# Patient Record
Sex: MALE | Race: WHITE | Employment: OTHER | URBAN - NONMETROPOLITAN AREA
[De-identification: names, ages, dates, MRNs, and addresses within clinical notes are randomized per-mention and may not be internally consistent; named-entity substitution may affect disease eponyms.]

---

## 2019-05-20 RX ORDER — TAMSULOSIN HYDROCHLORIDE 0.4 MG/1
CAPSULE ORAL
Qty: 30 CAPSULE | Refills: 11 | Status: SHIPPED | OUTPATIENT
Start: 2019-05-20

## 2020-06-08 RX ORDER — TAMSULOSIN HYDROCHLORIDE 0.4 MG/1
CAPSULE ORAL
Qty: 30 CAPSULE | Refills: 11 | OUTPATIENT
Start: 2020-06-08

## 2020-06-11 RX ORDER — TAMSULOSIN HYDROCHLORIDE 0.4 MG/1
CAPSULE ORAL
Qty: 30 CAPSULE | Refills: 11 | OUTPATIENT
Start: 2020-06-11

## 2022-07-01 PROBLEM — R35.0 BENIGN PROSTATIC HYPERPLASIA WITH URINARY FREQUENCY: Status: ACTIVE | Noted: 2018-03-17

## 2022-07-01 PROBLEM — I25.10 CORONARY ATHEROSCLEROSIS: Status: ACTIVE | Noted: 2022-07-01

## 2022-07-01 PROBLEM — I07.1 TRICUSPID REGURGITATION: Status: ACTIVE | Noted: 2018-04-01

## 2022-07-01 PROBLEM — I48.91 ATRIAL FIBRILLATION (HCC): Status: ACTIVE | Noted: 2022-07-01

## 2022-07-01 PROBLEM — E66.9 OBESITY: Status: ACTIVE | Noted: 2022-07-01

## 2022-07-01 PROBLEM — I42.9 CARDIOMYOPATHY (HCC): Status: ACTIVE | Noted: 2019-05-22

## 2022-07-01 PROBLEM — M10.9 GOUT: Status: ACTIVE | Noted: 2021-08-18

## 2022-07-01 PROBLEM — I51.7 RIGHT VENTRICULAR DILATION: Status: ACTIVE | Noted: 2018-04-01

## 2022-07-01 PROBLEM — I73.9 PVD (PERIPHERAL VASCULAR DISEASE) (HCC): Status: ACTIVE | Noted: 2019-05-22

## 2022-07-01 PROBLEM — E11.42 TYPE 2 DIABETES MELLITUS WITH DIABETIC POLYNEUROPATHY (HCC): Status: ACTIVE | Noted: 2022-07-01

## 2022-07-01 PROBLEM — N40.1 BENIGN PROSTATIC HYPERPLASIA WITH URINARY FREQUENCY: Status: ACTIVE | Noted: 2018-03-17

## 2022-07-01 RX ORDER — POTASSIUM CHLORIDE 750 MG/1
TABLET, EXTENDED RELEASE ORAL
COMMUNITY
Start: 2021-11-20 | End: 2022-11-20

## 2022-07-01 RX ORDER — FUROSEMIDE 40 MG/1
40 TABLET ORAL DAILY
COMMUNITY
Start: 2022-01-12 | End: 2023-01-12

## 2022-07-01 RX ORDER — GLIMEPIRIDE 2 MG/1
2 TABLET ORAL
COMMUNITY
Start: 2022-06-02 | End: 2023-06-02

## 2022-07-01 RX ORDER — HYDRALAZINE HYDROCHLORIDE 50 MG/1
50 TABLET, FILM COATED ORAL 2 TIMES DAILY
COMMUNITY
Start: 2021-11-20 | End: 2022-11-20

## 2022-07-01 RX ORDER — FINASTERIDE 5 MG/1
5 TABLET, FILM COATED ORAL DAILY
COMMUNITY
Start: 2021-12-16 | End: 2022-12-16

## 2022-07-01 RX ORDER — ATORVASTATIN CALCIUM 80 MG/1
80 TABLET, FILM COATED ORAL DAILY
COMMUNITY
Start: 2021-12-16 | End: 2022-12-16

## 2022-07-01 RX ORDER — LISINOPRIL 20 MG/1
20 TABLET ORAL 2 TIMES DAILY
COMMUNITY
Start: 2021-12-09 | End: 2022-12-09

## 2022-07-01 RX ORDER — AMLODIPINE BESYLATE 5 MG/1
5 TABLET ORAL DAILY
COMMUNITY
Start: 2021-10-29 | End: 2022-10-29

## 2022-07-01 RX ORDER — LANOLIN ALCOHOL/MO/W.PET/CERES
1000 CREAM (GRAM) TOPICAL DAILY
COMMUNITY

## 2022-07-01 RX ORDER — WARFARIN SODIUM 5 MG/1
TABLET ORAL
Status: ON HOLD | COMMUNITY
Start: 2021-11-18 | End: 2022-09-24 | Stop reason: HOSPADM

## 2022-07-18 ENCOUNTER — INITIAL CONSULT (OUTPATIENT)
Dept: SURGERY | Age: 82
End: 2022-07-18
Payer: MEDICARE

## 2022-07-18 ENCOUNTER — TELEPHONE (OUTPATIENT)
Dept: SURGERY | Age: 82
End: 2022-07-18

## 2022-07-18 VITALS
OXYGEN SATURATION: 96 % | DIASTOLIC BLOOD PRESSURE: 72 MMHG | SYSTOLIC BLOOD PRESSURE: 130 MMHG | HEART RATE: 61 BPM | WEIGHT: 315 LBS | BODY MASS INDEX: 45.1 KG/M2 | TEMPERATURE: 96.4 F | HEIGHT: 70 IN

## 2022-07-18 DIAGNOSIS — R19.5 HEME + STOOL: Primary | ICD-10-CM

## 2022-07-18 DIAGNOSIS — D50.9 MICROCYTIC ANEMIA: ICD-10-CM

## 2022-07-18 PROCEDURE — G8419 CALC BMI OUT NRM PARAM NOF/U: HCPCS | Performed by: SURGERY

## 2022-07-18 PROCEDURE — 1036F TOBACCO NON-USER: CPT | Performed by: SURGERY

## 2022-07-18 PROCEDURE — G8427 DOCREV CUR MEDS BY ELIG CLIN: HCPCS | Performed by: SURGERY

## 2022-07-18 PROCEDURE — 99203 OFFICE O/P NEW LOW 30 MIN: CPT | Performed by: SURGERY

## 2022-07-18 PROCEDURE — 1123F ACP DISCUSS/DSCN MKR DOCD: CPT | Performed by: SURGERY

## 2022-07-18 RX ORDER — BISACODYL 5 MG
TABLET, DELAYED RELEASE (ENTERIC COATED) ORAL
Qty: 2 TABLET | Refills: 0 | Status: ON HOLD | OUTPATIENT
Start: 2022-07-18 | End: 2022-09-24 | Stop reason: HOSPADM

## 2022-07-18 RX ORDER — POLYETHYLENE GLYCOL 3350, SODIUM SULFATE ANHYDROUS, SODIUM BICARBONATE, SODIUM CHLORIDE, POTASSIUM CHLORIDE 236; 22.74; 6.74; 5.86; 2.97 G/4L; G/4L; G/4L; G/4L; G/4L
4 POWDER, FOR SOLUTION ORAL ONCE
Qty: 4000 ML | Refills: 0 | Status: SHIPPED | OUTPATIENT
Start: 2022-07-18 | End: 2022-07-18

## 2022-07-18 NOTE — LETTER
[facility]         Patient:Kevin Mosher           ETV:[id number]           Surgical/Procedure Planned: Colonoscopy    Date & Location: 7/25/22 @ [place] [address]       Outpatient   Planned Length of OR: 45 min.     Sedation: intravenous sedation      Estimated Cardiac Risk for Non-Cardiac Surgery/Procedure     Low______ Moderate______ High______    Medication Instructions - Clarification needed by this date: 7/19/22    Coumadin  Hold ___ Days      Resume medications:     Lovenox indicated: _____Yes _____NO    Provider:Dr. Darion Sanchez       Signature of Provider Giving Orders for Medication holds:    _____________________________________________

## 2022-07-18 NOTE — TELEPHONE ENCOUNTER
Coumadin hold forwarded via fax to [facility] Dr. Isaac Link per PCP Dr. Misha Cooley.

## 2022-07-18 NOTE — PROGRESS NOTES
Name:  Keila Cervantes  Age:  [de-identified] y.o.   :  1940    Physician: Queen Reji MD       Chief Complaint: Heme + stools. anemia      HPI:  No gross rectal bleeding. Does have gross hematuria. Known bladder cancer. On warfarin for afib    Has a colonoscopy maybe 20 + years ago. Remember being told he had a thin colon wall. MEDICAL HISTORY:    Past Medical History:        Diagnosis Date    Atrial fibrillation ([facility] 2022    Benign prostatic hyperplasia with urinary frequency 3/17/2018    Cardiomyopathy ([address] 2019    Coronary atherosclerosis 2022    Gout 2021    Obesity 2022    PVD (peripheral vascular disease) ([facility] [address] 2019    Right ventricular dilation 2018    Tricuspid regurgitation 2018    Formatting of this note might be different from the original. mild  Formatting of this note might be different from the original. mild    Type 2 diabetes mellitus with diabetic polyneuropathy ([facility] [address] 2022       Past Surgical History:        Procedure Laterality Date    BACK SURGERY      three times    COLONOSCOPY N/A     [facility] [facility]    KNEE ARTHROPLASTY Left        Prior to Admission medications    Medication Sig Start Date End Date Taking?  Authorizing Provider   amLODIPine (NORVASC) 5 MG tablet Take 5 mg by mouth daily 10/29/21 10/29/22 Yes Historical Provider, MD   atorvastatin (LIPITOR) 80 MG tablet Take 80 mg by mouth daily 21 Yes Historical Provider, MD   finasteride (PROSCAR) 5 MG tablet Take 5 mg by mouth daily 21 Yes Historical Provider, MD   furosemide (LASIX) 40 MG tablet Take 40 mg by mouth daily 22 Yes Historical Provider, MD   glimepiride (AMARYL) 2 MG tablet Take 2 mg by mouth 22 Yes Historical Provider, MD   hydrALAZINE (APRESOLINE) 50 MG tablet Take 50 mg by mouth 2 times daily 21 Yes Historical Provider, MD   lisinopril (PRINIVIL;ZESTRIL) 20 MG tablet Take 20 mg by mouth 2 times daily 12/9/21 12/9/22 Yes Historical Provider, MD   metFORMIN (GLUCOPHAGE) 1000 MG tablet Take 1,000 mg by mouth 8/24/21 8/24/22 Yes Historical Provider, MD   potassium chloride (KLOR-CON M) 10 MEQ extended release tablet TAKE 1 TABLET (10 MEQ TOTAL) BY MOUTH DAILY WITH FOOD 11/20/21 11/20/22 Yes Historical Provider, MD   warfarin (COUMADIN) 5 MG tablet TAKE 1 TAB ON SUNDAY, TUESDAY, THURSDAY, AND SAT. TAKE 1 AND 1/2 TAB ON MON, WED, FRIDAY--follow Cou[facility]in Clinic instructions as subject to change 11/18/21 11/18/22 Yes Historical Provider, MD   vitamin B-12 (CYANOCOBALAMIN) 1000 MCG tablet Take 1,000 mcg by mouth daily   Yes Historical Provider, MD   tamsulosin (FLOMAX) 0.4 MG capsule take 1 capsule by mouth once daily 5/20/19  Yes Katie Bah MD       No Known Allergies     reports that he has never smoked. He has never used smokeless tobacco.  reports that he does not currently use alcohol. No family history on file. REVIEW OF SYSTEMS:  General:  negative  Eye:  negative   ENT:negative  Allergy/Immunology:  negative  Hematology/Lymphatic: negative  Lungs: negative  Cardiovascular: negative  Gastrointestinal: No nausea or vomiting. Does have dysphagia  : Hematuria  Neurological: negative  MS: Difficulty walking. History falling. Uses walker      PHYSICAL EXAM:    /72 (Site: Left Upper Arm, Position: Sitting, Cuff Size: Large Adult)   Pulse 61   Temp (!) 96.4 °F (35.8 °C) (Temporal)   Ht 5' 10\" (1.778 m)   Wt (!) 315 lb 6.4 oz (143.1 kg)   SpO2 96%   BMI 45.26 kg/m²       Gen: Alert and oriented x3, no acute distress, morbidly obese    Eyes: PERRL, Sclera Anicteric    Head: Normocephalic, non tender     Neck: Supple, no significant adenopathy.  No carotid bruits, thyroid normal size and no masses    Chest: CTA, no wheezes, no rales, no rhonchi, symmetrical    Heart: Normal rate, regular rhythm, no murmurs    Abdomen: Soft, positive bowel sounds, non tender, non distended, no masses, no hernias, no HSM, no bruits. Neuro: Normal speech, motor/sensory grossly normal bilateral    MSK: No joint tenderness, deformity, or swelling           ASSESSMENT:  1) Heme + stools, anemia  - His anemia is likely due to his hematuria and anticoagulation.  - It is reasonable to consider a colonoscopy. PLAN:  Colonoscopy - Risks and benefits of colonoscopy were discussed with Felipa Gale. In particular I discussed the possibility of incomplete colonoscopy and failure to make a diagnosis. I also discussed the risks and consequences of reactions to the sedatives, bleeding and perforation. Alternate ways of evaluating the colon including barium enema, CT colonography and sigmoidoscopy were discussed. he was also given the opportunity to have any questions answered, and encouraged to call the office with additional issues.       Electronically signed by Terra Montenegro MD on 7/18/2022 at 11:12 AM

## 2022-07-20 NOTE — TELEPHONE ENCOUNTER
Cardiac risk assessment and Coumadin medication hold forwarded to [facility] cardiology Dr. Martin Platt office. Patient has not been seen since 2019 and assessment/ med hold won't be addressed until patient is seen at appointment 8/31/22. Patient informed that colonoscopy would have to be rescheduled until after he see's cardiologist and patient doesn't want to wait that long. Explained to patient it is for patients safety due to receiving anesthesia during colonoscopy and patient voiced understanding. Patient wants Dr. Alejandro Moulton to address med hold and cardiac risk assessment so writer contacted PCP office and explained situation and note is being sent to Dr. Alejandro Moulton so he is aware.

## 2022-07-21 NOTE — TELEPHONE ENCOUNTER
PCP office contacted writer yesterday stating Dr. Rebecca Goodman is aware that colonoscopy would be rescheduled until after patient seen cardiologist. PCP ordered stress test for patient and patient has appointment next Thursday with PCP @ [facility]. Patient informed PCP still wants patient seen by cardio before cardiac risk assessment and coumadin med hold is addressed. Colonoscopy cancelled for 7/25/22 @ [facility] and rescheduled for 9/12/22 @ [facility]. [facility] aware of reschedule.

## 2022-08-25 ENCOUNTER — TELEPHONE (OUTPATIENT)
Dept: SURGERY | Age: 82
End: 2022-08-25

## 2022-08-25 NOTE — TELEPHONE ENCOUNTER
Contacted patient to confirm that patient is still scheduled to see cardiologist Dr. Danika Truong at [facility] in [facility]. [facility] on 8/31/22 to obtain cardiac clearance and medication holds for upcoming colonoscopy scheduled for 9/12/22 @ [place] [address]. Patient confirmed appointment and colonoscopy.

## 2022-08-29 NOTE — TELEPHONE ENCOUNTER
Writer sent fax to [facility] with cardiac risk assessment and Coumadin hold for Dr. Isaac Link to address at upcoming appointment on 8/31/22. Returned fax stated patients appointment is not until 10/12/22 and procedure would have to be rescheduled. Writer contacted patient and patient was hard to understand but stated cardiologist office called and said they needed to reschedule appointment due to provider having an emergency. Patient was upset due to already having to post pone colonoscopy due to needing cardiac clearance. Patient stated he called Dr. Lana Rollins office and they worked it out and got patient an earlier appointment tomorrow. Writer contacted [facility] and spoke with Dr. Torres Oliver and she states patient is not scheduled until 10/19/22 @ the [facility] office. LM for patient to clarify.

## 2022-08-30 ENCOUNTER — OFFICE VISIT (OUTPATIENT)
Dept: CARDIOLOGY CLINIC | Age: 82
End: 2022-08-30
Payer: MEDICARE

## 2022-08-30 ENCOUNTER — TELEPHONE (OUTPATIENT)
Dept: CARDIOLOGY CLINIC | Age: 82
End: 2022-08-30

## 2022-08-30 VITALS
HEART RATE: 59 BPM | HEIGHT: 70 IN | SYSTOLIC BLOOD PRESSURE: 142 MMHG | BODY MASS INDEX: 45.26 KG/M2 | DIASTOLIC BLOOD PRESSURE: 70 MMHG

## 2022-08-30 DIAGNOSIS — Z01.810 PREOP CARDIOVASCULAR EXAM: ICD-10-CM

## 2022-08-30 DIAGNOSIS — I48.91 ATRIAL FIBRILLATION, UNSPECIFIED TYPE (HCC): Primary | ICD-10-CM

## 2022-08-30 PROCEDURE — 1036F TOBACCO NON-USER: CPT | Performed by: INTERNAL MEDICINE

## 2022-08-30 PROCEDURE — 1123F ACP DISCUSS/DSCN MKR DOCD: CPT | Performed by: INTERNAL MEDICINE

## 2022-08-30 PROCEDURE — 93000 ELECTROCARDIOGRAM COMPLETE: CPT | Performed by: INTERNAL MEDICINE

## 2022-08-30 PROCEDURE — G8427 DOCREV CUR MEDS BY ELIG CLIN: HCPCS | Performed by: INTERNAL MEDICINE

## 2022-08-30 PROCEDURE — 99204 OFFICE O/P NEW MOD 45 MIN: CPT | Performed by: INTERNAL MEDICINE

## 2022-08-30 PROCEDURE — G8417 CALC BMI ABV UP PARAM F/U: HCPCS | Performed by: INTERNAL MEDICINE

## 2022-08-30 NOTE — PROGRESS NOTES
[facility] [address] [place] [address]  Dept: [phone]  Dept Fax: [phone]  Loc: [phone]    Visit Date: 8/30/2022    Mr. Allen Drummond is a [de-identified] y.o. male  who presented for:  Chief Complaint   Patient presents with    New Patient    Abnormal Test Results    Pre-op Exam       HPI:   [de-identified] yo M c hx of Afib on coumadin, bladder cancer, Obesity, DM, ?cardiomyopathy is here for preop for colonoscopy. Patient apparently had a stress test in [facility] but the results are not available to me. Patient walks with walker. Denies any chest pain, sob, palpitations, lorthopnea, PND or pedal edema. EKG shows Afib slow heart rate with PVCs. Current Outpatient Medications:     amLODIPine (NORVASC) 5 MG tablet, Take 5 mg by mouth daily, Disp: , Rfl:     atorvastatin (LIPITOR) 80 MG tablet, Take 80 mg by mouth daily, Disp: , Rfl:     finasteride (PROSCAR) 5 MG tablet, Take 5 mg by mouth daily, Disp: , Rfl:     furosemide (LASIX) 40 MG tablet, Take 40 mg by mouth daily, Disp: , Rfl:     glimepiride (AMARYL) 2 MG tablet, Take 2 mg by mouth, Disp: , Rfl:     hydrALAZINE (APRESOLINE) 50 MG tablet, Take 50 mg by mouth 2 times daily, Disp: , Rfl:     lisinopril (PRINIVIL;ZESTRIL) 20 MG tablet, Take 20 mg by mouth 2 times daily , Disp: , Rfl:     metFORMIN (GLUCOPHAGE) 1000 MG tablet, Take 1,000 mg by mouth, Disp: , Rfl:     potassium chloride (KLOR-CON M) 10 MEQ extended release tablet, TAKE 1 TABLET (10 MEQ TOTAL) BY MOUTH DAILY WITH FOOD, Disp: , Rfl:     warfarin (COUMADIN) 5 MG tablet, TAKE 1 TAB ON SUNDAY, TUESDAY, THURSDAY, AND SAT.  TAKE 1 AND 1/2 TAB ON MON, WED, FRIDAY--follow Coumadin Clinic instructions as subject to change, Disp: , Rfl:     vitamin B-12 (CYANOCOBALAMIN) 1000 MCG tablet, Take 1,000 mcg by mouth daily, Disp: , Rfl:     tamsulosin (FLOMAX) 0.4 MG capsule, take 1 capsule by mouth once daily, Disp: 30 capsule, Rfl: 11    bisacodyl 5 MG EC tablet, Take two 5 mg tablet by mouth @ 1200 noon the day before your procedure. (Patient not taking: Reported on 8/30/2022), Disp: 2 tablet, Rfl: 0    Past Medical History  Will Krishnan  has a past medical history of Atrial fibrillation ([facility] [address] Benign prostatic hyperplasia with urinary frequency, Cancer ([address] Cardiomyopathy ([address] Coronary atherosclerosis, Gout, Morbid obesity with body mass index (BMI) of 45.0 to 49.9 in [facility]), PVD (peripheral vascular disease) ([facility] [address] Right ventricular dilation, Tricuspid regurgitation, and Type 2 diabetes mellitus with diabetic polyneuropathy ([facility] [address] Social History  Will Krishnan  reports that he has never smoked. He has never used smokeless tobacco. He reports that he does not currently use alcohol. He reports that he does not use drugs. Family History  Kevin family history is not on file. Past Surgical History   Past Surgical History:   Procedure Laterality Date    BACK SURGERY      three times    COLONOSCOPY N/A     [facility] in [place] [address]'s    KNEE ARTHROPLASTY Left        Subjective:     REVIEW OF SYSTEMS  Constitutional: denies sweats, chills and fever  HENT: denies  congestion, sinus pressure, sneezing and sore throat. Eyes: denies  pain, discharge, redness and itching. Respiratory: denies apnea, cough  Gastrointestinal: denies blood in stool, constipation, diarrhea   Endocrine: denies cold intolerance, heat intolerance, polydipsia. Genitourinary: denies dysuria, enuresis, flank pain and hematuria. Musculoskeletal: denies arthralgias, joint swelling and neck pain. Neurological: denies numbness and headaches. Psychiatric/Behavioral: denies agitation, confusion, decreased concentration and dysphoric mood    All others reviewed and are negative.    Objective:     BP (!) 142/70   Pulse 59   Ht 5' 10\" (1.778 m)   BMI 45.26 kg/m²     Wt Readings from Last 3 Encounters:   07/18/22 (!) 315 lb 6.4 oz (143.1 kg)     BP Readings from Last 3 Encounters:   08/30/22 (!) 142/70   07/18/22 130/72       PHYSICAL EXAM  Constitutional: Oriented to person, place, and time. Obese  HENT:   Head: Normocephalic and atraumatic. Eyes: EOM are normal. Pupils are equal, round, and reactive to light. Neck: Normal range of motion. Neck supple. No JVD present. Cardiovascular: Normal rate , normal heart sounds and intact distal pulses. Pulmonary/Chest: Effort normal and breath sounds normal. No respiratory distress. No wheezes. No rales. Abdominal: Soft. Bowel sounds are normal. No distension. There is no tenderness. Musculoskeletal: Normal range of motion. No edema. Neurological: Alert and oriented to person, place, and time. No cranial nerve deficit. Coordination normal.   Skin: Skin is warm and dry. Psychiatric: Normal mood and affect. No results found for: CKTOTAL, CKMB, CKMBINDEX    No results found for: WBC, RBC, HGB, HCT, MCV, MCH, MCHC, RDW, PLT, MPV    No results found for: NA, K, CL, CO2, BUN, LABALBU, CREATININE, CALCIUM, GFRAA, LABGLOM, GLUCOSE, GLU    No results found for: ALKPHOS, ALT, AST, PROT, BILITOT, BILIDIR, IBILI, LABALBU    No results found for: MG    No results found for: INR, PROTIME      No results found for: LABA1C    No results found for: TRIG, HDL, LDLCALC, LDLDIRECT, LABVLDL    No results found for: TSH      Testing Reviewed:      I haveindividually reviewed the below cardiac tests    EKG:    ECHO: No results found for this or any previous visit. STRESS:    CATH:    Assessment/Plan       Diagnosis Orders   1. Atrial fibrillation, unspecified type (HCC)  EKG 12 Lead      2.  Preop cardiovascular exam          Preop risk stratification for colonoscopy  Afib on coumadin  DM  Bladder cancer    Reviwed EKG  Needs to review the recent stress test  Will try to obtain results from [facility]  At present patient denies any anginal or heart failrue sympmots  Walks with a walker  Had poor exercise tolerance  The patient is asked to make an attempt to improve diet and exercise patterns to aid in medical management of this problem. Advised more plant based nutrition/meditarrean diet   Advised patient to call office or seek immediate medical attention if there is any new onset of  any chest pain, sob, palpitations, lightheadedness, dizziness, orthopnea, PND or pedal edema. All medication side effects were discussed in details. Thank youfor allowing me to participate in the care of this patient. Please do not hesitate to contact me for any further questions. Return in about 3 months (around 11/30/2022), or if symptoms worsen or fail to improve, for Regular follow up, Review testing.        Electronically signed by Jl Melissa MD [facility]  8/30/2022 at 10:22 AM EDT

## 2022-08-30 NOTE — TELEPHONE ENCOUNTER
Received pre-op risk assessment from Dr. Anderson Mantilla office @ [facility]. [facility] for patient to proceed with scheduled colonoscopy at [facility] on 9/12/22. Patient is to hold coumadin x 5 days prior.

## 2022-08-30 NOTE — TELEPHONE ENCOUNTER
I LM for PCP Dr. Jimbo Temple to call our office back to see if they have results of stress test done at [facility]. [address] [place]. I talked to Medical Records at [facility] and they will be faxing over the stress test results.

## 2022-09-19 ENCOUNTER — TELEPHONE (OUTPATIENT)
Dept: CARDIOLOGY CLINIC | Age: 82
End: 2022-09-19

## 2022-09-19 ENCOUNTER — TELEPHONE (OUTPATIENT)
Dept: SURGERY | Age: 82
End: 2022-09-19

## 2022-09-19 NOTE — LETTER
[facility]  [address], [place] [address]  Phone [phone]  Fax [phone]      Dr. Dread Hill    9/19/2022    Dear Gregg Bell,    Dr. Riaz Franz reviewed the results of your recent colonoscopy on 9/12/22 at [facility]. The findings include diverticulosis and four polyps were removed that pathology determined were all benign (noncancerous) tubular adenomas. His recommendations are to be scoped again in 6 months ( March 2023) due to the type and size and polyp plus the less than optimal bowel prep. If you have any questions or concerns regarding your test results or our recommendations, please call the office at [phone].     Sincerely,    Thomas Urias LPN

## 2022-09-19 NOTE — TELEPHONE ENCOUNTER
Pre op Risk Assessment    Procedure bladder tumor resection with [facility]   Physician Dr Gertrudis Charles  Date of surgery/procedure 9/23/22    Last OV 8/30/22  Last Stress 8/23/22  Last Echo none in chart   Last Cath none in chart   Last Stent   Is patient on blood thinners coumadin   Hold Meds/how many days ? ?

## 2022-09-19 NOTE — TELEPHONE ENCOUNTER
Letter created and mailed to patient with results from recent colonoscopy at [facility] with Dr. Beba Kirkland on 9/12/22. Updated history, health maintenance, and recall. Forwarded results to PCP.

## 2022-09-22 ENCOUNTER — ANESTHESIA EVENT (OUTPATIENT)
Dept: OPERATING ROOM | Age: 82
End: 2022-09-22
Payer: MEDICARE

## 2022-09-23 ENCOUNTER — ANESTHESIA (OUTPATIENT)
Dept: OPERATING ROOM | Age: 82
End: 2022-09-23
Payer: MEDICARE

## 2022-09-23 ENCOUNTER — HOSPITAL ENCOUNTER (OUTPATIENT)
Age: 82
Setting detail: OBSERVATION
Discharge: HOME | End: 2022-09-24
Attending: UROLOGY | Admitting: STUDENT IN AN ORGANIZED HEALTH CARE EDUCATION/TRAINING PROGRAM
Payer: MEDICARE

## 2022-09-23 DIAGNOSIS — D49.4 BLADDER TUMOR: ICD-10-CM

## 2022-09-23 DIAGNOSIS — R31.0 GROSS HEMATURIA: ICD-10-CM

## 2022-09-23 PROBLEM — R31.9 HEMATURIA: Status: ACTIVE | Noted: 2022-09-23

## 2022-09-23 PROBLEM — C67.8 MALIGNANT NEOPLASM OF OVERLAPPING SITES OF BLADDER (HCC): Status: ACTIVE | Noted: 2022-09-23

## 2022-09-23 LAB
GLUCOSE BLD-MCNC: 101 MG/DL (ref 75–110)
GLUCOSE BLD-MCNC: 144 MG/DL (ref 75–110)
GLUCOSE BLD-MCNC: 93 MG/DL (ref 75–110)
INR BLD: 1.6
PROTHROMBIN TIME: 16.1 SEC (ref 9.4–12.6)

## 2022-09-23 PROCEDURE — 2720000010 HC SURG SUPPLY STERILE: Performed by: UROLOGY

## 2022-09-23 PROCEDURE — 6360000002 HC RX W HCPCS: Performed by: NURSE ANESTHETIST, CERTIFIED REGISTERED

## 2022-09-23 PROCEDURE — 6370000000 HC RX 637 (ALT 250 FOR IP): Performed by: STUDENT IN AN ORGANIZED HEALTH CARE EDUCATION/TRAINING PROGRAM

## 2022-09-23 PROCEDURE — G0378 HOSPITAL OBSERVATION PER HR: HCPCS

## 2022-09-23 PROCEDURE — 2580000003 HC RX 258: Performed by: ANESTHESIOLOGY

## 2022-09-23 PROCEDURE — 7100000000 HC PACU RECOVERY - FIRST 15 MIN: Performed by: UROLOGY

## 2022-09-23 PROCEDURE — A4216 STERILE WATER/SALINE, 10 ML: HCPCS | Performed by: UROLOGY

## 2022-09-23 PROCEDURE — 6360000002 HC RX W HCPCS: Performed by: UROLOGY

## 2022-09-23 PROCEDURE — 3600000004 HC SURGERY LEVEL 4 BASE: Performed by: UROLOGY

## 2022-09-23 PROCEDURE — 3700000001 HC ADD 15 MINUTES (ANESTHESIA): Performed by: UROLOGY

## 2022-09-23 PROCEDURE — 3700000000 HC ANESTHESIA ATTENDED CARE: Performed by: UROLOGY

## 2022-09-23 PROCEDURE — 2580000003 HC RX 258: Performed by: UROLOGY

## 2022-09-23 PROCEDURE — 2580000003 HC RX 258: Performed by: STUDENT IN AN ORGANIZED HEALTH CARE EDUCATION/TRAINING PROGRAM

## 2022-09-23 PROCEDURE — 99219 PR INITIAL OBSERVATION CARE/DAY 50 MINUTES: CPT | Performed by: STUDENT IN AN ORGANIZED HEALTH CARE EDUCATION/TRAINING PROGRAM

## 2022-09-23 PROCEDURE — 88307 TISSUE EXAM BY PATHOLOGIST: CPT

## 2022-09-23 PROCEDURE — 7100000001 HC PACU RECOVERY - ADDTL 15 MIN: Performed by: UROLOGY

## 2022-09-23 PROCEDURE — 85610 PROTHROMBIN TIME: CPT

## 2022-09-23 PROCEDURE — 82947 ASSAY GLUCOSE BLOOD QUANT: CPT

## 2022-09-23 PROCEDURE — 2709999900 HC NON-CHARGEABLE SUPPLY: Performed by: UROLOGY

## 2022-09-23 PROCEDURE — 3600000014 HC SURGERY LEVEL 4 ADDTL 15MIN: Performed by: UROLOGY

## 2022-09-23 PROCEDURE — 2500000003 HC RX 250 WO HCPCS: Performed by: NURSE ANESTHETIST, CERTIFIED REGISTERED

## 2022-09-23 PROCEDURE — 36415 COLL VENOUS BLD VENIPUNCTURE: CPT

## 2022-09-23 RX ORDER — SODIUM CHLORIDE 0.9 % (FLUSH) 0.9 %
5-40 SYRINGE (ML) INJECTION PRN
Status: DISCONTINUED | OUTPATIENT
Start: 2022-09-23 | End: 2022-09-23 | Stop reason: HOSPADM

## 2022-09-23 RX ORDER — FINASTERIDE 5 MG/1
5 TABLET, FILM COATED ORAL DAILY
Status: DISCONTINUED | OUTPATIENT
Start: 2022-09-24 | End: 2022-09-24 | Stop reason: HOSPADM

## 2022-09-23 RX ORDER — ACETAMINOPHEN 325 MG/1
650 TABLET ORAL EVERY 6 HOURS PRN
Status: DISCONTINUED | OUTPATIENT
Start: 2022-09-23 | End: 2022-09-24 | Stop reason: HOSPADM

## 2022-09-23 RX ORDER — SODIUM CHLORIDE 0.9 % (FLUSH) 0.9 %
5-40 SYRINGE (ML) INJECTION EVERY 12 HOURS SCHEDULED
Status: DISCONTINUED | OUTPATIENT
Start: 2022-09-23 | End: 2022-09-23 | Stop reason: HOSPADM

## 2022-09-23 RX ORDER — ACETAMINOPHEN 650 MG/1
650 SUPPOSITORY RECTAL EVERY 6 HOURS PRN
Status: DISCONTINUED | OUTPATIENT
Start: 2022-09-23 | End: 2022-09-24 | Stop reason: HOSPADM

## 2022-09-23 RX ORDER — FUROSEMIDE 20 MG/1
40 TABLET ORAL DAILY
Status: DISCONTINUED | OUTPATIENT
Start: 2022-09-24 | End: 2022-09-24 | Stop reason: HOSPADM

## 2022-09-23 RX ORDER — OXYCODONE HYDROCHLORIDE AND ACETAMINOPHEN 5; 325 MG/1; MG/1
1 TABLET ORAL EVERY 4 HOURS PRN
Status: DISCONTINUED | OUTPATIENT
Start: 2022-09-23 | End: 2022-09-24 | Stop reason: HOSPADM

## 2022-09-23 RX ORDER — MORPHINE SULFATE 2 MG/ML
1 INJECTION, SOLUTION INTRAMUSCULAR; INTRAVENOUS EVERY 5 MIN PRN
Status: DISCONTINUED | OUTPATIENT
Start: 2022-09-23 | End: 2022-09-23 | Stop reason: HOSPADM

## 2022-09-23 RX ORDER — ONDANSETRON 2 MG/ML
INJECTION INTRAMUSCULAR; INTRAVENOUS PRN
Status: DISCONTINUED | OUTPATIENT
Start: 2022-09-23 | End: 2022-09-23 | Stop reason: SDUPTHER

## 2022-09-23 RX ORDER — POLYETHYLENE GLYCOL 3350 17 G/17G
17 POWDER, FOR SOLUTION ORAL DAILY PRN
Status: DISCONTINUED | OUTPATIENT
Start: 2022-09-23 | End: 2022-09-24 | Stop reason: HOSPADM

## 2022-09-23 RX ORDER — SODIUM CHLORIDE 0.9 % (FLUSH) 0.9 %
5-40 SYRINGE (ML) INJECTION EVERY 12 HOURS SCHEDULED
Status: DISCONTINUED | OUTPATIENT
Start: 2022-09-23 | End: 2022-09-24 | Stop reason: HOSPADM

## 2022-09-23 RX ORDER — LIDOCAINE HYDROCHLORIDE 10 MG/ML
INJECTION, SOLUTION INFILTRATION; PERINEURAL PRN
Status: DISCONTINUED | OUTPATIENT
Start: 2022-09-23 | End: 2022-09-23 | Stop reason: SDUPTHER

## 2022-09-23 RX ORDER — DEXAMETHASONE SODIUM PHOSPHATE 10 MG/ML
INJECTION, SOLUTION INTRAMUSCULAR; INTRAVENOUS PRN
Status: DISCONTINUED | OUTPATIENT
Start: 2022-09-23 | End: 2022-09-23 | Stop reason: SDUPTHER

## 2022-09-23 RX ORDER — ONDANSETRON 4 MG/1
4 TABLET, ORALLY DISINTEGRATING ORAL EVERY 8 HOURS PRN
Status: DISCONTINUED | OUTPATIENT
Start: 2022-09-23 | End: 2022-09-24 | Stop reason: HOSPADM

## 2022-09-23 RX ORDER — SODIUM CHLORIDE 9 MG/ML
25 INJECTION, SOLUTION INTRAVENOUS PRN
Status: DISCONTINUED | OUTPATIENT
Start: 2022-09-23 | End: 2022-09-23 | Stop reason: HOSPADM

## 2022-09-23 RX ORDER — DEXTROSE MONOHYDRATE 100 MG/ML
INJECTION, SOLUTION INTRAVENOUS CONTINUOUS PRN
Status: DISCONTINUED | OUTPATIENT
Start: 2022-09-23 | End: 2022-09-24 | Stop reason: HOSPADM

## 2022-09-23 RX ORDER — LISINOPRIL 20 MG/1
20 TABLET ORAL 2 TIMES DAILY
Status: DISCONTINUED | OUTPATIENT
Start: 2022-09-23 | End: 2022-09-24 | Stop reason: HOSPADM

## 2022-09-23 RX ORDER — ONDANSETRON 2 MG/ML
4 INJECTION INTRAMUSCULAR; INTRAVENOUS
Status: DISCONTINUED | OUTPATIENT
Start: 2022-09-23 | End: 2022-09-23 | Stop reason: HOSPADM

## 2022-09-23 RX ORDER — TAMSULOSIN HYDROCHLORIDE 0.4 MG/1
0.4 CAPSULE ORAL DAILY
Status: DISCONTINUED | OUTPATIENT
Start: 2022-09-23 | End: 2022-09-24 | Stop reason: HOSPADM

## 2022-09-23 RX ORDER — FENTANYL CITRATE 50 UG/ML
INJECTION, SOLUTION INTRAMUSCULAR; INTRAVENOUS PRN
Status: DISCONTINUED | OUTPATIENT
Start: 2022-09-23 | End: 2022-09-23 | Stop reason: SDUPTHER

## 2022-09-23 RX ORDER — MEPERIDINE HYDROCHLORIDE 50 MG/ML
12.5 INJECTION INTRAMUSCULAR; INTRAVENOUS; SUBCUTANEOUS ONCE
Status: DISCONTINUED | OUTPATIENT
Start: 2022-09-23 | End: 2022-09-23 | Stop reason: HOSPADM

## 2022-09-23 RX ORDER — SODIUM CHLORIDE 9 MG/ML
INJECTION, SOLUTION INTRAVENOUS PRN
Status: DISCONTINUED | OUTPATIENT
Start: 2022-09-23 | End: 2022-09-24 | Stop reason: HOSPADM

## 2022-09-23 RX ORDER — ROCURONIUM BROMIDE 10 MG/ML
INJECTION, SOLUTION INTRAVENOUS PRN
Status: DISCONTINUED | OUTPATIENT
Start: 2022-09-23 | End: 2022-09-23 | Stop reason: SDUPTHER

## 2022-09-23 RX ORDER — INSULIN LISPRO 100 [IU]/ML
0-4 INJECTION, SOLUTION INTRAVENOUS; SUBCUTANEOUS NIGHTLY
Status: DISCONTINUED | OUTPATIENT
Start: 2022-09-23 | End: 2022-09-24 | Stop reason: HOSPADM

## 2022-09-23 RX ORDER — HYDRALAZINE HYDROCHLORIDE 50 MG/1
50 TABLET, FILM COATED ORAL 2 TIMES DAILY
Status: DISCONTINUED | OUTPATIENT
Start: 2022-09-23 | End: 2022-09-24 | Stop reason: HOSPADM

## 2022-09-23 RX ORDER — LIDOCAINE HYDROCHLORIDE 10 MG/ML
1 INJECTION, SOLUTION EPIDURAL; INFILTRATION; INTRACAUDAL; PERINEURAL
Status: DISCONTINUED | OUTPATIENT
Start: 2022-09-23 | End: 2022-09-23 | Stop reason: HOSPADM

## 2022-09-23 RX ORDER — ONDANSETRON 2 MG/ML
4 INJECTION INTRAMUSCULAR; INTRAVENOUS EVERY 6 HOURS PRN
Status: DISCONTINUED | OUTPATIENT
Start: 2022-09-23 | End: 2022-09-24 | Stop reason: HOSPADM

## 2022-09-23 RX ORDER — SODIUM CHLORIDE 9 MG/ML
INJECTION, SOLUTION INTRAVENOUS PRN
Status: DISCONTINUED | OUTPATIENT
Start: 2022-09-23 | End: 2022-09-23 | Stop reason: HOSPADM

## 2022-09-23 RX ORDER — ATORVASTATIN CALCIUM 40 MG/1
80 TABLET, FILM COATED ORAL NIGHTLY
Status: DISCONTINUED | OUTPATIENT
Start: 2022-09-23 | End: 2022-09-24 | Stop reason: HOSPADM

## 2022-09-23 RX ORDER — PROPOFOL 10 MG/ML
INJECTION, EMULSION INTRAVENOUS PRN
Status: DISCONTINUED | OUTPATIENT
Start: 2022-09-23 | End: 2022-09-23 | Stop reason: SDUPTHER

## 2022-09-23 RX ORDER — SODIUM CHLORIDE, SODIUM LACTATE, POTASSIUM CHLORIDE, CALCIUM CHLORIDE 600; 310; 30; 20 MG/100ML; MG/100ML; MG/100ML; MG/100ML
INJECTION, SOLUTION INTRAVENOUS CONTINUOUS
Status: DISCONTINUED | OUTPATIENT
Start: 2022-09-23 | End: 2022-09-23

## 2022-09-23 RX ORDER — SODIUM CHLORIDE 0.9 % (FLUSH) 0.9 %
5-40 SYRINGE (ML) INJECTION PRN
Status: DISCONTINUED | OUTPATIENT
Start: 2022-09-23 | End: 2022-09-24 | Stop reason: HOSPADM

## 2022-09-23 RX ORDER — MIDAZOLAM HYDROCHLORIDE 1 MG/ML
INJECTION INTRAMUSCULAR; INTRAVENOUS PRN
Status: DISCONTINUED | OUTPATIENT
Start: 2022-09-23 | End: 2022-09-23 | Stop reason: SDUPTHER

## 2022-09-23 RX ORDER — DIPHENHYDRAMINE HYDROCHLORIDE 50 MG/ML
12.5 INJECTION INTRAMUSCULAR; INTRAVENOUS
Status: DISCONTINUED | OUTPATIENT
Start: 2022-09-23 | End: 2022-09-23 | Stop reason: HOSPADM

## 2022-09-23 RX ORDER — AMLODIPINE BESYLATE 5 MG/1
5 TABLET ORAL DAILY
Status: DISCONTINUED | OUTPATIENT
Start: 2022-09-24 | End: 2022-09-24 | Stop reason: HOSPADM

## 2022-09-23 RX ORDER — INSULIN LISPRO 100 [IU]/ML
0-4 INJECTION, SOLUTION INTRAVENOUS; SUBCUTANEOUS
Status: DISCONTINUED | OUTPATIENT
Start: 2022-09-23 | End: 2022-09-24 | Stop reason: HOSPADM

## 2022-09-23 RX ADMIN — FENTANYL CITRATE 50 MCG: 50 INJECTION, SOLUTION INTRAMUSCULAR; INTRAVENOUS at 13:58

## 2022-09-23 RX ADMIN — SUGAMMADEX 200 MG: 100 INJECTION, SOLUTION INTRAVENOUS at 14:38

## 2022-09-23 RX ADMIN — FENTANYL CITRATE 50 MCG: 50 INJECTION, SOLUTION INTRAMUSCULAR; INTRAVENOUS at 14:27

## 2022-09-23 RX ADMIN — DEXAMETHASONE SODIUM PHOSPHATE 4 MG: 10 INJECTION, SOLUTION INTRAMUSCULAR; INTRAVENOUS at 14:09

## 2022-09-23 RX ADMIN — ATORVASTATIN CALCIUM 80 MG: 40 TABLET, FILM COATED ORAL at 22:13

## 2022-09-23 RX ADMIN — ONDANSETRON 4 MG: 2 INJECTION INTRAMUSCULAR; INTRAVENOUS at 14:33

## 2022-09-23 RX ADMIN — LIDOCAINE HYDROCHLORIDE 40 MG: 10 INJECTION, SOLUTION INFILTRATION; PERINEURAL at 13:58

## 2022-09-23 RX ADMIN — LISINOPRIL 20 MG: 20 TABLET ORAL at 22:15

## 2022-09-23 RX ADMIN — Medication 3000 MG: at 13:52

## 2022-09-23 RX ADMIN — SODIUM CHLORIDE, POTASSIUM CHLORIDE, SODIUM LACTATE AND CALCIUM CHLORIDE: 600; 310; 30; 20 INJECTION, SOLUTION INTRAVENOUS at 13:50

## 2022-09-23 RX ADMIN — ROCURONIUM BROMIDE 35 MG: 10 INJECTION, SOLUTION INTRAVENOUS at 13:58

## 2022-09-23 RX ADMIN — SODIUM CHLORIDE, PRESERVATIVE FREE 10 ML: 5 INJECTION INTRAVENOUS at 22:54

## 2022-09-23 RX ADMIN — INSULIN LISPRO 4 UNITS: 100 INJECTION, SOLUTION INTRAVENOUS; SUBCUTANEOUS at 23:05

## 2022-09-23 RX ADMIN — MIDAZOLAM 2 MG: 1 INJECTION INTRAMUSCULAR; INTRAVENOUS at 13:52

## 2022-09-23 RX ADMIN — HYDRALAZINE HYDROCHLORIDE 50 MG: 50 TABLET, FILM COATED ORAL at 22:15

## 2022-09-23 RX ADMIN — PROPOFOL 170 MG: 10 INJECTION, EMULSION INTRAVENOUS at 13:58

## 2022-09-23 RX ADMIN — TAMSULOSIN HYDROCHLORIDE 0.4 MG: 0.4 CAPSULE ORAL at 18:25

## 2022-09-23 ASSESSMENT — PAIN - FUNCTIONAL ASSESSMENT: PAIN_FUNCTIONAL_ASSESSMENT: NONE - DENIES PAIN

## 2022-09-23 NOTE — ANESTHESIA POSTPROCEDURE EVALUATION
POST- ANESTHESIA EVALUATION       Pt Name: Florentin Lewis  MRN: [id number]  YOB: 1940  Date of evaluation: 9/23/2022  Time:  3:53 PM      /63   Pulse 53   Temp 97.1 °F (36.2 °C)   Resp 18   Ht 5' 11\" (1.803 m)   Wt (!) 311 lb (141.1 kg)   SpO2 97%   BMI 43.38 kg/m²      Consciousness Level  Awake  Cardiopulmonary Status  Stable  Pain Adequately Treated YES  Nausea / Vomiting  NO  Adequate Hydration  YES  Anesthesia Related Complications NONE      Electronically signed by Anshul Russell MD on 9/23/2022 at 3:53 PM       Department of Anesthesiology  Postprocedure Note    Patient: Florentin Lewis  MRN: [id number]  YOB: 1940  Date of evaluation: 9/23/2022      Procedure Summary     Date: 09/23/22 Room / Location: [facility] 02 / [address] [place] [address]    Anesthesia Start: 3757 Anesthesia Stop: 1452    Procedure: CYSTOSCOPY TRANSURETHRAL RESECTION BLADDER WITH INSTILLATION GEMCITIBINE Diagnosis:       Gross hematuria      Bladder tumor      (Gross hematuria [R31.0])      (Bladder tumor [D49.4])    Surgeons: Tammi Graves MD Responsible Provider: Anshul Russell MD    Anesthesia Type: general ASA Status: 3          Anesthesia Type: No value filed.     Mateo Phase I: Mateo Score: 8    Mateo Phase II:        Anesthesia Post Evaluation

## 2022-09-23 NOTE — ANESTHESIA PRE PROCEDURE
Department of Anesthesiology  Preprocedure Note       Name:  Mayuri Flores   Age:  [de-identified] y.o.  :  1940                                          MRN:  [id number]         Date:  2022      Surgeon: Yareli Helton):  Abdelrahman Howell MD    Procedure: Procedure(s):  CYSTOSCOPY TRANSURETHRAL RESECTION BLADDER WITH INSTILLATION GEMCITIBINE    Medications prior to admission:   Prior to Admission medications    Medication Sig Start Date End Date Taking? Authorizing Provider   bisacodyl 5 MG EC tablet Take two 5 mg tablet by mouth @ 1200 noon the day before your procedure. Patient not taking: Reported on 2022   Bonny Hou MD   amLODIPine (NORVASC) 5 MG tablet Take 5 mg by mouth daily 10/29/21 10/29/22  Historical Provider, MD   atorvastatin (LIPITOR) 80 MG tablet Take 80 mg by mouth daily 21  Historical Provider, MD   finasteride (PROSCAR) 5 MG tablet Take 5 mg by mouth daily 21  Historical Provider, MD   furosemide (LASIX) 40 MG tablet Take 40 mg by mouth daily 22  Historical Provider, MD   glimepiride (AMARYL) 2 MG tablet Take 2 mg by mouth 22  Historical Provider, MD   hydrALAZINE (APRESOLINE) 50 MG tablet Take 50 mg by mouth 2 times daily 21  Historical Provider, MD   lisinopril (PRINIVIL;ZESTRIL) 20 MG tablet Take 20 mg by mouth 2 times daily  21  Historical Provider, MD   metFORMIN (GLUCOPHAGE) 1000 MG tablet Take 1,000 mg by mouth 21  Historical Provider, MD   potassium chloride (KLOR-CON M) 10 MEQ extended release tablet TAKE 1 TABLET (10 MEQ TOTAL) BY MOUTH DAILY WITH FOOD 21  Historical Provider, MD   warfarin (COUMADIN) 5 MG tablet TAKE 1 TAB ON , TUESDAY, THURSDAY, AND SAT.  TAKE 1 AND 1/2 TAB ON MON, WED, FRIDAY--follow Coumadin Clinic instructions as subject to change 11/18/21 11/18/22  Historical Provider, MD   vitamin B-12 (CYANOCOBALAMIN) 1000 MCG tablet Take 1,000 mcg by mouth daily    Historical Provider, MD   tamsulosin [facility]) 0.4 MG capsule take 1 capsule by mouth once daily 5/20/19   Robin Mendez MD       Current medications:    Current Facility-Administered Medications   Medication Dose Route Frequency Provider Last Rate Last Admin    lidocaine PF 1 % injection 1 mL  1 mL IntraDERmal Once PRN Jonnathan Valverde MD        lactated ringers infusion   IntraVENous Continuous Jonnathan Valverde MD        sodium chloride flush 0.9 % injection 5-40 mL  5-40 mL IntraVENous 2 times per day Jonnathan Valverde MD        sodium chloride flush 0.9 % injection 5-40 mL  5-40 mL IntraVENous PRN Jonnathan Valverde MD        0.9 % sodium chloride infusion   IntraVENous PRN Jonnathan Valverde MD        ceFAZolin (ANCEF) 3000 mg in dextrose 5 % 100 mL IVPB  3,000 mg IntraVENous Once Jojo Mueller MD        ceFAZolin (ANCEF) IVPB             gemcitabine HCl (GEMZAR) 2,000 mg in sodium chloride (PF) 100 mL chemo bladder instillation  2,000 mg Bladder Instillation Once Jojo Mueller MD           Allergies:  No Known Allergies    Problem List:    Patient Active Problem List   Diagnosis Code    Atrial fibrillation ([address] I48.91    Benign prostatic hyperplasia with urinary frequency N40.1, R35.0    Cardiomyopathy ([address] I42.9    Coronary atherosclerosis I25.10    Gout M10.9    Obesity E66.9    PVD (peripheral vascular disease) ([address] I73.9    Right ventricular dilation I51.7    Tricuspid regurgitation I07.1    Type 2 diabetes mellitus with diabetic polyneuropathy ([address] E11.42       Past Medical History:        Diagnosis Date    Atrial fibrillation ([address] 07/01/2022    Benign prostatic hyperplasia with urinary frequency 03/17/2018    Cancer ([address]     bladder    Cardiomyopathy ([address] 05/22/2019    CKD (chronic kidney disease)     Coronary atherosclerosis 07/01/2022    Gout 08/18/2021    Morbid obesity with body mass index (BMI) of 45.0 to 49.9 in adult [facility]) INR, APTT    HCG (If Applicable): No results found for: PREGTESTUR, PREGSERUM, HCG, HCGQUANT     ABGs: No results found for: PHART, PO2ART, LUM1DVV, OKD4GGL, BEART, S2TFQJCL     Type & Screen (If Applicable):  No results found for: LABABO, LABRH    Drug/Infectious Status (If Applicable):  No results found for: HIV, HEPCAB    COVID-19 Screening (If Applicable): No results found for: COVID19        Anesthesia Evaluation  Patient summary reviewed and Nursing notes reviewed no history of anesthetic complications:   Airway: Mallampati: III          Dental:    (+) edentulous      Pulmonary:Negative Pulmonary ROS and normal exam  breath sounds clear to auscultation                             Cardiovascular:  Exercise tolerance: no interval change,   (+) CAD: no interval change, dysrhythmias: atrial fibrillation,       ECG reviewed  Rhythm: regular  Rate: normal                 ROS comment: Cardiomyopathy     Neuro/Psych:   (+) neuromuscular disease:,             GI/Hepatic/Renal:   (+) renal disease: CRI and no interval change, morbid obesity         ROS comment: Bladder tumor. Endo/Other:    (+) DiabetesType II DM, no interval change, , malignancy/cancer. ROS comment: Gout Abdominal:   (+) obese,     Abdomen: soft. Vascular:   + PVD, aortic or cerebral, . Other Findings:           Anesthesia Plan      general     ASA 3     (GETA  INR 1.6)  Induction: intravenous. Anesthetic plan and risks discussed with patient. Plan discussed with CRNA.                     Sima Howe MD   9/23/2022

## 2022-09-23 NOTE — OP NOTE
FACILITY:  [address] [place] [address]  1940  [id number]    DATE: 09/23/22  SURGEON:  Dr. Giovanna Garcia MD , MD  ASSISTANT: Dr. Giovanna Garcia MD MD  PREOPERATIVE DIAGNOSIS: Bladder tumor  POSTOPERATIVE DIAGNOSIS: Bladder tumor  PROCEDURES PERFORMED:  1.)  Cystoscopy, Transurethral resection of bladder tumor, large size  2.)  Installation of gemcitibine  DRAINS: 22 fr 3 way casey catheter  SPECIMEN:  Bladder tumor  ANESTHESIA: General  ESTIMATED BLOOD LOSS: None. COMPLICATIONS: None. Indications: Albert Tobar is a [de-identified] y.o. male with a prior history of a bladder tumor. All treatment options were discussed. Risks, benefits, goals, alternatives, possible complications were discussed with patient. Patient elected for above mentioned procedures. Consent was signed. Patient elected to proceed. Details of the procedure: Patient was brought back to operating room, laid in supine position. EPC cuffs were placed on and functioning prior to induction of anesthesia. Antibiotics were administered. GETA was administered. Patient was positioned in dorsolithotomy position and genitals were prepped and draped in sterile fashion. Time out was performed. We placed visual obturator scope within the urethra and into the bladder. A pan-cystoscopy was performed and showed tumor at the: left lateral dome. He had a large distended bladder, which made the tumor difficult to reach. We switched to a resectoscope. We then resected the tumor systematically until we reached the stalk. We resected al visible tumor and deep enough to incorporate muscle into the specimen. We then obtained adequate hemostasis. We used Ellik evacuator to remove all specimen. This concluded the case. He tolerated the procedure well. We decided to instill gemcitibine into the bladder to prevent recurrence (2gm/ 100 ml), and a casey catheter in place.      Plan/Follow up:   Admit for observation  CBI will be started after bladder drained  Follow up [facility] clinic 1-2 weeks for pathology report.

## 2022-09-23 NOTE — H&P
[facility]  Office: [address], DO, Chelle Burns DO, Bouchra Weiss, DO, Dougie Mccullough, DO, Lizbeth Agustin MD, Dave Mercado MD, Suzan Reed MD, Karel Drake MD,  Rebecca Bojorquez MD, Simeon Henry MD, Erick Tyson, DO, Ran Leo MD,  Hortensia Pollard MD, Jimbo Casillas MD, Amada Salter DO, Leonel Trivedi MD, Stanley Sanders MD, Izzy Guadarrama MD, Bertha Hastings MD, Satnam Wallace MD, Jamilah Ruiz MD, Shira Calvo DO, Supa Anne MD, Van Olsen MD, Alferd Felty, CNP,  Chetna Ochoa, CNP, Ethyl Mercury, CNP, Candi Wadsworth, CNP,  Deborah Hill, DNP, Demetra Gregg, CNP, Ricardo Harrison, CNP, Mauricio Brittle, CNP, Michelle Mendez, CNP, Leo Fam, CNP, Zunilda Dorado PA-C, Norma Yip, CNS, Dalton Durán, DNP, Tommy Payne, CNP, Karina Ramos, CNP, Atilio Castillo, CNP         [facility]    HISTORY AND PHYSICAL EXAMINATION            Date:   9/23/2022  Patient name:  Lennie Hewitt  Date of admission:  9/23/2022 11:00 AM  MRN:   [id number]  Account:  [de-identified]  YOB: 1940  PCP:    Suzan Post  Room:   323/[facility]  Code Status:    Full Code    Chief Complaint:     S/P bladder mass resection     History Obtained From:     patient    History of Present Illness:     Lennie Hewitt is a [de-identified] y.o. male with a past medical history of bladder mass who presented to this facility for elective bladder mass resection and instillation of chemotherapy into the bladder. The procedure was without complication. The patient is admitted to internal medicine for post-operative management and continuous bladder irrigation.      Past Medical History:     Past Medical History:   Diagnosis Date    Atrial fibrillation ([facility] 07/01/2022    Benign prostatic hyperplasia with urinary frequency 03/17/2018    Cancer ([facility]     bladder    Cardiomyopathy ([address] 05/22/2019    CKD (chronic kidney disease)     Coronary atherosclerosis 07/01/2022    Gout 08/18/2021    Morbid obesity with body mass index (BMI) of 45.0 to 49.9 in adult [facility]) 07/01/2022    Obesity     Pulmonary hypertension ([facility]     PVD (peripheral vascular disease) ([facility] 05/22/2019    Right ventricular dilation 04/01/2018    Tricuspid regurgitation 04/01/2018    Formatting of this note might be different from the original. mild  Formatting of this note might be different from the original. mild    Type 2 diabetes mellitus with diabetic polyneuropathy ([facility] 07/01/2022        Past Surgical History:     Past Surgical History:   Procedure Laterality Date    BACK SURGERY      three times    COLONOSCOPY N/A     [facility] [place] [facility]    COLONOSCOPY W/ BIOPSIES N/A 09/12/2022    diverticulosis, x 4 adenoma polyps removed, done @ [facility] by Dr. Juan Barker.  CYSTOSCOPY      CYSTOSCOPY      w bladder biospy    CYSTOSCOPY  09/23/2022    CYSTOSCOPY TRANSURETHRAL RESECTION BLADDER WITH INSTILLATION GEMCITIBINE    FINGER SURGERY      KNEE ARTHROPLASTY Left     TRANSURETHRAL RESECTION OF BLADDER TUMOR          Medications Prior to Admission:     Prior to Admission medications    Medication Sig Start Date End Date Taking? Authorizing Provider   bisacodyl 5 MG EC tablet Take two 5 mg tablet by mouth @ 1200 noon the day before your procedure.   Patient not taking: Reported on 8/30/2022 7/18/22   Edith Garcia MD   amLODIPine (NORVASC) 5 MG tablet Take 5 mg by mouth daily 10/29/21 10/29/22  Historical Provider, MD   atorvastatin (LIPITOR) 80 MG tablet Take 80 mg by mouth daily 12/16/21 12/16/22  Historical Provider, MD   finasteride (PROSCAR) 5 MG tablet Take 5 mg by mouth daily 12/16/21 12/16/22  Historical Provider, MD   furosemide (LASIX) 40 MG tablet Take 40 mg by mouth daily 1/12/22 1/12/23  Historical Provider, MD   glimepiride (AMARYL) 2 MG tablet Take 2 mg by mouth 6/2/22 6/2/23  Historical Provider, MD   hydrALAZINE (APRESOLINE) 50 MG tablet Take 50 mg by mouth 2 times daily 11/20/21 11/20/22  Historical Provider, MD   lisinopril (PRINIVIL;ZESTRIL) 20 MG tablet Take 20 mg by mouth 2 times daily  12/9/21 12/9/22  Historical Provider, MD   metFORMIN (GLUCOPHAGE) 1000 MG tablet Take 1,000 mg by mouth 8/24/21 8/30/22  Historical Provider, MD   potassium chloride (KLOR-CON M) 10 MEQ extended release tablet TAKE 1 TABLET (10 MEQ TOTAL) BY MOUTH DAILY WITH FOOD 11/20/21 11/20/22  Historical Provider, MD   warfarin (COUMADIN) 5 MG tablet TAKE 1 TAB ON SUNDAY, TUESDAY, THURSDAY, AND SAT. TAKE 1 AND 1/2 TAB ON MON, WED, FRIDAY--follow Coumadin Clinic instructions as subject to change 11/18/21 11/18/22  Historical Provider, MD   vitamin B-12 (CYANOCOBALAMIN) 1000 MCG tablet Take 1,000 mcg by mouth daily    Historical Provider, MD   tamsulosin (FLOMAX) 0.4 MG capsule take 1 capsule by mouth once daily 5/20/19   Jan Lombardi MD        Allergies:     Patient has no known allergies. Social History:     Tobacco:    reports that he has never smoked. He has never used smokeless tobacco.  Alcohol:      reports that he does not currently use alcohol. Drug Use:  reports no history of drug use. Family History:     History reviewed. No pertinent family history. Review of Systems:     Positive and Negative as described in HPI. CONSTITUTIONAL:  negative for fevers, chills, sweats, fatigue, weight loss  HEENT:  negative for vision, hearing changes, runny nose, throat pain  RESPIRATORY:  negative for shortness of breath, cough, congestion, wheezing  CARDIOVASCULAR:  negative for chest pain, palpitations  GASTROINTESTINAL:  negative for nausea, vomiting, diarrhea, constipation, change in bowel habits, abdominal pain   GENITOURINARY:  Positive for dysuria.    INTEGUMENT:  negative for rash, skin lesions, easy bruising   HEMATOLOGIC/LYMPHATIC:  negative for swelling/edema   ALLERGIC/IMMUNOLOGIC:  negative for urticaria , itching  ENDOCRINE:  negative increase in drinking, increase in urination, hot or cold intolerance  MUSCULOSKELETAL:  negative joint pains, muscle aches, swelling of joints  NEUROLOGICAL:  negative for headaches, dizziness, lightheadedness, numbness, pain, tingling extremities  BEHAVIOR/PSYCH:  negative for depression, anxiety    Physical Exam:   BP (!) 152/83   Pulse 55   Temp 97.5 °F (36.4 °C) (Temporal)   Resp 18   Ht 5' 11\" (1.803 m)   Wt (!) 311 lb (141.1 kg)   SpO2 97%   BMI 43.38 kg/m²   Temp (24hrs), Av.2 °F (36.2 °C), Min:97 °F (36.1 °C), Max:97.5 °F (36.4 °C)    Recent Labs     22  1150 22  1621   POCGLU 93 101       Intake/Output Summary (Last 24 hours) at 2022 1725  Last data filed at 2022 1447  Gross per 24 hour   Intake 1000 ml   Output --   Net 1000 ml       General Appearance:  alert, well appearing, and in no acute distress  Mental status: oriented to person, place, and time  Head:  normocephalic, atraumatic  Eye: no icterus, redness, pupils equal and reactive, extraocular eye movements intact, conjunctiva clear  Ear: normal external ear, no discharge, hearing intact  Nose:  no drainage noted  Mouth: mucous membranes moist  Neck: supple, no carotid bruits, thyroid not palpable  Lungs: Bilateral equal air entry, clear to ausculation, no wheezing, rales or rhonchi, normal effort  Cardiovascular: normal rate, regular rhythm, no murmur, gallop, rub  Abdomen: Soft, nontender, nondistended, normal bowel sounds, no hepatomegaly or splenomegaly  Neurologic: There are no new focal motor or sensory deficits, normal muscle tone and bulk, no abnormal sensation, normal speech, cranial nerves II through XII grossly intact  Skin: No gross lesions, rashes, bruising or bleeding on exposed skin area  Extremities:  peripheral pulses palpable, no pedal edema or calf pain with palpation  Psych: normal affect     Investigations:      Laboratory Testing:  Recent Results (from the past 24 hour(s))   Protime-INR    Collection Time: 09/23/22 11:46 AM   Result Value Ref Range    Protime 16.1 (H) 9.4 - 12.6 sec    INR 1.6    POC Glucose Fingerstick    Collection Time: 09/23/22 11:50 AM   Result Value Ref Range    POC Glucose 93 75 - 110 mg/dL   POC Glucose Fingerstick    Collection Time: 09/23/22  4:21 PM   Result Value Ref Range    POC Glucose 101 75 - 110 mg/dL       Imaging/Diagnostics:  No results found.     Assessment :      Hospital Problems             Last Modified POA    * (Principal) Hematuria 9/23/2022 Yes    Atrial fibrillation ([facility] 9/23/2022 Yes    Benign prostatic hyperplasia with urinary frequency 9/23/2022 Yes    Obesity 9/23/2022 Yes    Type 2 diabetes mellitus with diabetic polyneuropathy ([facility] 9/23/2022 Yes    Malignant neoplasm of overlapping sites of bladder ([facility] 9/23/2022 Yes       Plan:     Patient status observation in the Med/Surge    Bladder mass   -S/P resection per urology   -Follow-up outpatient for pathology results in 1-2 weeks   -S/P Gemzar bladder instillation   -Continuous bladder irrigation overnight  -Anticipate discharge home tomorrow AM     Atrial fibrillation   -Rate-controlled   -Recommend holding coumadin until 9/27 due to bleeding risk     DM2   -LDSSI   -Hypoglycemia protocol     HTN  -Continue home amlodipine 5 mg daily   -Continue home hydralazine 50 mg bid     HLD  -Continue home atorvastatin 80 mg nightly       Consultations:   None      Nikky Woodall MD  9/23/2022  5:25 PM    Copy sent to Dr. Samantha Meyer

## 2022-09-23 NOTE — DISCHARGE INSTRUCTIONS
Transurethral resection of Bladder Tumor with Installation of Gemcitibine:  The patient will have Mitomycin instilled into bladder and held for 1 hr in PACU. They should then empty her bladder before discharge. The patient will be discharged home with casey. Hold blood thinning medication for 5 days (9/27), then restart as previously directed    You may see blood in the urine after the procedure. This should resolve over the next couple days. Please stay hydrated. If the blood in the urine becomes significant, and doesn't improve to a clear/pink appearance, please call. You may experience frequency/urgency of urination after the procedure. We expect these symptoms to improve over the next couple weeks. Tylenol for pain control  Pt ok to discharge home in good condition  No heavy lifting, >10 lbs for today  Pt should avoid strenuous activity for today  Pt should walk moderately at home  Pt ok to shower   Pt may resume diet as tolerated  Pt should take Rx as directed  No driving while on narcotics  Please call attending physician or hospital  with questions  Call or Present to ED if fever (> 101F), intractable nausea vomiting or pain. Pt should follow up with Dr. Yaneth Rainey MD, Monday in [facility] urology clinic for catheter removed, call to confirm appointment. Will have office visit in 1-2 weeks for pathology report. Home with casey catheter. Please teach casey education and send home with leg and night bag. You may see intermittent blood in the urine while the catheter in place. If the catheter becomes obstructed and needs to be exchanged, please call.

## 2022-09-23 NOTE — H&P
Daria Carrero  Urology H&P Note     Patient:  Hannah Castillo  MRN: [id number]  YOB: 1940    ATTENDING: Patrick Bynum MD     CHIEF COMPLAINT:  Bladder tumor    HISTORY OF PRESENT ILLNESS:   The patient is a [de-identified] y.o. male who presents with bladder tumor, hematuria and clots. Patient's old records, notes and chart reviewed and summarized above. Past Medical History:    Past Medical History:   Diagnosis Date    Atrial fibrillation ([facility] 07/01/2022    Benign prostatic hyperplasia with urinary frequency 03/17/2018    Cancer ([facility]     bladder    Cardiomyopathy ([facility] 05/22/2019    CKD (chronic kidney disease)     Coronary atherosclerosis 07/01/2022    Gout 08/18/2021    Morbid obesity with body mass index (BMI) of 45.0 to 49.9 in adult [facility]) 07/01/2022    Obesity     Pulmonary hypertension (HCC)     PVD (peripheral vascular disease) ([facility] 05/22/2019    Right ventricular dilation 04/01/2018    Tricuspid regurgitation 04/01/2018    Formatting of this note might be different from the original. mild  Formatting of this note might be different from the original. mild    Type 2 diabetes mellitus with diabetic polyneuropathy ([facility] 07/01/2022       Past Surgical History:    Past Surgical History:   Procedure Laterality Date    BACK SURGERY      three times    COLONOSCOPY N/A     [facility]'s    COLONOSCOPY W/ BIOPSIES N/A 09/12/2022    diverticulosis, x 4 adenoma polyps removed, done @ [facility] by Dr. Ernesto Madden. CYSTOSCOPY      CYSTOSCOPY      w bladder biospy    FINGER SURGERY      KNEE ARTHROPLASTY Left     TRANSURETHRAL RESECTION OF BLADDER TUMOR         Medications Prior to Admission:   Prior to Admission medications    Medication Sig Start Date End Date Taking? Authorizing Provider   bisacodyl 5 MG EC tablet Take two 5 mg tablet by mouth @ 1200 noon the day before your procedure.   Patient not taking: Reported on 8/30/2022 7/18/22   Milderd Mole Malika Verma MD   amLODIPine (NORVASC) 5 MG tablet Take 5 mg by mouth daily 10/29/21 10/29/22  Historical Provider, MD   atorvastatin (LIPITOR) 80 MG tablet Take 80 mg by mouth daily 12/16/21 12/16/22  Historical Provider, MD   finasteride (PROSCAR) 5 MG tablet Take 5 mg by mouth daily 12/16/21 12/16/22  Historical Provider, MD   furosemide (LASIX) 40 MG tablet Take 40 mg by mouth daily 1/12/22 1/12/23  Historical Provider, MD   glimepiride (AMARYL) 2 MG tablet Take 2 mg by mouth 6/2/22 6/2/23  Historical Provider, MD   hydrALAZINE (APRESOLINE) 50 MG tablet Take 50 mg by mouth 2 times daily 11/20/21 11/20/22  Historical Provider, MD   lisinopril (PRINIVIL;ZESTRIL) 20 MG tablet Take 20 mg by mouth 2 times daily  12/9/21 12/9/22  Historical Provider, MD   metFORMIN (GLUCOPHAGE) 1000 MG tablet Take 1,000 mg by mouth 8/24/21 8/30/22  Historical Provider, MD   potassium chloride (KLOR-CON M) 10 MEQ extended release tablet TAKE 1 TABLET (10 MEQ TOTAL) BY MOUTH DAILY WITH FOOD 11/20/21 11/20/22  Historical Provider, MD   warfarin (COUMADIN) 5 MG tablet TAKE 1 TAB ON SUNDAY, TUESDAY, THURSDAY, AND SAT. TAKE 1 AND 1/2 TAB ON MON, WED, FRIDAY--follow [facility] instructions as subject to change 11/18/21 11/18/22  Historical Provider, MD   vitamin B-12 (CYANOCOBALAMIN) 1000 MCG tablet Take 1,000 mcg by mouth daily    Historical Provider, MD   tamsulosin (FLOMAX) 0.4 MG capsule take 1 capsule by mouth once daily 5/20/19   Cassy Wells MD       Allergies:  Patient has no known allergies.     Social History:    Social History     Socioeconomic History    Marital status:      Spouse name: Not on file    Number of children: Not on file    Years of education: Not on file    Highest education level: Not on file   Occupational History    Not on file   Tobacco Use    Smoking status: Never    Smokeless tobacco: Never   Substance and Sexual Activity    Alcohol use: Not Currently    Drug use: Never    Sexual activity: Not on file Other Topics Concern    Not on file   Social History Narrative    Not on file     Social Determinants of Health     Financial Resource Strain: Not on file   Food Insecurity: Not on file   Transportation Needs: Not on file   Physical Activity: Not on file   Stress: Not on file   Social Connections: Not on file   Intimate Partner Violence: Not on file   Housing Stability: Not on file       Family History:  History reviewed. No pertinent family history. REVIEW OF SYSTEMS:  All systems reviewed and negative except for that already noted in the HPI. Physical Exam:      Patient Vitals for the past 24 hrs:   BP Temp Pulse Resp SpO2 Height Weight   09/23/22 1152 (!) 131/55 97 °F (36.1 °C) 63 16 98 % -- --   09/23/22 1136 -- -- -- -- -- 5' 11\" (1.803 m) (!) 311 lb (141.1 kg)     Constitutional: Patient in no acute distress; Neuro: alert and oriented to person place and time. Psych: Mood and affect normal.  Skin: Normal  Lungs: Respiratory effort normal  Cardiovascular:  Normal peripheral pulses  Abdomen: Soft, non-tender, non-distended with no CVA, flank pain, hepatosplenomegaly or hernia. Kidneys normal.  Bladder non-tender and not distended. Lymphatics: no palpable lymphadenopathy        Assessment and Plan   Impression:    Patient Active Problem List   Diagnosis    Atrial fibrillation ([address]    Benign prostatic hyperplasia with urinary frequency    Cardiomyopathy ([address]    Coronary atherosclerosis    Gout    Obesity    PVD (peripheral vascular disease) ([address]    Right ventricular dilation    Tricuspid regurgitation    Type 2 diabetes mellitus with diabetic polyneuropathy ([address]       Plan:   TURBT  Risks benefits and alternative procedures are explained, informed consent is obtained, and the patient elects to proceed.

## 2022-09-24 VITALS
RESPIRATION RATE: 16 BRPM | TEMPERATURE: 97.5 F | BODY MASS INDEX: 43.54 KG/M2 | HEIGHT: 71 IN | DIASTOLIC BLOOD PRESSURE: 66 MMHG | OXYGEN SATURATION: 94 % | WEIGHT: 311 LBS | SYSTOLIC BLOOD PRESSURE: 123 MMHG | HEART RATE: 60 BPM

## 2022-09-24 LAB
ABSOLUTE EOS #: 0 K/UL (ref 0–0.4)
ABSOLUTE LYMPH #: 0.22 K/UL (ref 1–4.8)
ABSOLUTE MONO #: 0.11 K/UL (ref 0.1–0.8)
ANION GAP SERPL CALCULATED.3IONS-SCNC: 10 MMOL/L (ref 9–17)
BASOPHILS # BLD: 0 % (ref 0–2)
BASOPHILS ABSOLUTE: 0 K/UL (ref 0–0.2)
BUN BLDV-MCNC: 33 MG/DL (ref 8–23)
CALCIUM SERPL-MCNC: 8.4 MG/DL (ref 8.6–10.4)
CHLORIDE BLD-SCNC: 112 MMOL/L (ref 98–107)
CO2: 19 MMOL/L (ref 20–31)
CREAT SERPL-MCNC: 1.5 MG/DL (ref 0.7–1.2)
EOSINOPHILS RELATIVE PERCENT: 0 % (ref 1–4)
GFR AFRICAN AMERICAN: 55 ML/MIN
GFR NON-AFRICAN AMERICAN: 45 ML/MIN
GFR SERPL CREATININE-BSD FRML MDRD: ABNORMAL ML/MIN/{1.73_M2}
GLUCOSE BLD-MCNC: 234 MG/DL (ref 75–110)
GLUCOSE BLD-MCNC: 240 MG/DL (ref 75–110)
GLUCOSE BLD-MCNC: 322 MG/DL (ref 70–99)
HCT VFR BLD CALC: 31.7 % (ref 41–53)
HEMOGLOBIN: 10.3 G/DL (ref 13.5–17.5)
LYMPHOCYTES # BLD: 2 % (ref 24–44)
MCH RBC QN AUTO: 30.3 PG (ref 26–34)
MCHC RBC AUTO-ENTMCNC: 32.5 G/DL (ref 31–37)
MCV RBC AUTO: 93.2 FL (ref 80–100)
MONOCYTES # BLD: 1 % (ref 1–7)
MORPHOLOGY: NORMAL
PDW BLD-RTO: 14.6 % (ref 12.5–15.4)
PLATELET # BLD: 215 K/UL (ref 140–450)
PMV BLD AUTO: 9.2 FL (ref 6–12)
POTASSIUM SERPL-SCNC: 5.7 MMOL/L (ref 3.7–5.3)
RBC # BLD: 3.4 M/UL (ref 4.5–5.9)
SEG NEUTROPHILS: 97 % (ref 36–66)
SEGMENTED NEUTROPHILS ABSOLUTE COUNT: 10.47 K/UL (ref 1.8–7.7)
SODIUM BLD-SCNC: 141 MMOL/L (ref 135–144)
WBC # BLD: 10.8 K/UL (ref 3.5–11)

## 2022-09-24 PROCEDURE — 2580000003 HC RX 258: Performed by: STUDENT IN AN ORGANIZED HEALTH CARE EDUCATION/TRAINING PROGRAM

## 2022-09-24 PROCEDURE — 80048 BASIC METABOLIC PNL TOTAL CA: CPT

## 2022-09-24 PROCEDURE — G0378 HOSPITAL OBSERVATION PER HR: HCPCS

## 2022-09-24 PROCEDURE — 99217 PR OBSERVATION CARE DISCHARGE MANAGEMENT: CPT | Performed by: STUDENT IN AN ORGANIZED HEALTH CARE EDUCATION/TRAINING PROGRAM

## 2022-09-24 PROCEDURE — 82947 ASSAY GLUCOSE BLOOD QUANT: CPT

## 2022-09-24 PROCEDURE — 36415 COLL VENOUS BLD VENIPUNCTURE: CPT

## 2022-09-24 PROCEDURE — 85025 COMPLETE CBC W/AUTO DIFF WBC: CPT

## 2022-09-24 PROCEDURE — 6370000000 HC RX 637 (ALT 250 FOR IP): Performed by: STUDENT IN AN ORGANIZED HEALTH CARE EDUCATION/TRAINING PROGRAM

## 2022-09-24 RX ADMIN — AMLODIPINE BESYLATE 5 MG: 5 TABLET ORAL at 09:18

## 2022-09-24 RX ADMIN — HYDRALAZINE HYDROCHLORIDE 50 MG: 50 TABLET, FILM COATED ORAL at 09:18

## 2022-09-24 RX ADMIN — FINASTERIDE 5 MG: 5 TABLET, FILM COATED ORAL at 09:18

## 2022-09-24 RX ADMIN — INSULIN LISPRO 1 UNITS: 100 INJECTION, SOLUTION INTRAVENOUS; SUBCUTANEOUS at 12:08

## 2022-09-24 RX ADMIN — INSULIN LISPRO 1 UNITS: 100 INJECTION, SOLUTION INTRAVENOUS; SUBCUTANEOUS at 09:17

## 2022-09-24 RX ADMIN — LISINOPRIL 20 MG: 20 TABLET ORAL at 09:18

## 2022-09-24 RX ADMIN — FUROSEMIDE 40 MG: 20 TABLET ORAL at 09:18

## 2022-09-24 RX ADMIN — SODIUM CHLORIDE, PRESERVATIVE FREE 10 ML: 5 INJECTION INTRAVENOUS at 09:21

## 2022-09-24 RX ADMIN — TAMSULOSIN HYDROCHLORIDE 0.4 MG: 0.4 CAPSULE ORAL at 09:18

## 2022-09-24 NOTE — PLAN OF CARE
Problem: Chronic Conditions and Co-morbidities  Goal: Patient's chronic conditions and co-morbidity symptoms are monitored and maintained or improved  9/24/2022 1306 by Nakita Torres RN  Outcome: Completed  Flowsheets (Taken 9/24/2022 0805)  Care Plan - Patient's Chronic Conditions and Co-Morbidity Symptoms are Monitored and Maintained or Improved: Monitor and assess patient's chronic conditions and comorbid symptoms for stability, deterioration, or improvement    Problem: Discharge Planning  Goal: Discharge to home or other facility with appropriate resources  9/24/2022 1306 by Nakita Torres RN  Outcome: Completed  Flowsheets (Taken 9/24/2022 0805)  Discharge to home or other facility with appropriate resources:   Identify barriers to discharge with patient and caregiver   Arrange for needed discharge resources and transportation as appropriate   Identify discharge learning needs (meds, wound care, etc)  9/24/2022 0101 by Roxanne Camargo RN  Outcome: Progressing  Flowsheets (Taken 9/23/2022 1722 by Jose Clinton RN)  Discharge to home or other facility with appropriate resources: Identify barriers to discharge with patient and caregiver     Problem: Skin/Tissue Integrity  Goal: Absence of new skin breakdown  Description: 1. Monitor for areas of redness and/or skin breakdown  2. Assess vascular access sites hourly  3. Every 4-6 hours minimum:  Change oxygen saturation probe site  4. Every 4-6 hours:  If on nasal continuous positive airway pressure, respiratory therapy assess nares and determine need for appliance change or resting period.   9/24/2022 1306 by Nakita Torres RN  Outcome: Completed  9/24/2022 0101 by Roxanne Camargo RN  Outcome: Progressing     Problem: Safety - Adult  Goal: Free from fall injury  9/24/2022 1306 by Nakita Torres RN  Outcome: Completed  Flowsheets (Taken 9/24/2022 1139)  Free From Fall Injury: Swati Nicolas family/caregiver on patient safety  9/24/2022 0101 by Roxanne Camargo RN  Outcome: Progressing     Problem: ABCDS Injury Assessment  Goal: Absence of physical injury  9/24/2022 1306 by Marie Moctezuma RN  Outcome: Completed  9/24/2022 0101 by Ketty Salazar RN  Outcome: Progressing

## 2022-09-24 NOTE — PROGRESS NOTES
RN notified urologist that patient's CBI has been clamped for two hours and is clear, cherry red. OK to discharge per urology.

## 2022-09-24 NOTE — FLOWSHEET NOTE
One touch result 303. Machine did not recognize the patients ID band.  Band was verified and is correct

## 2022-09-24 NOTE — PROGRESS NOTES
RN discontinued the CBI. RN put a leg strap for the casey bag on the patient and showed him how to use it. Additionally, RN explained where the bag should be placed and how frequently to empty and clean the genital area. RN reviewed all discharge paperwork with patient and family. All questions and concerns were answered. RN removed IV with no complications. Patient is being taken down for discharge via wheelchair.

## 2022-09-24 NOTE — DISCHARGE SUMMARY
[facility]  Office: [address], DO, Ann Ch, DO, Gambino [facility], DO, Jeramie Green Blood, DO, Gabrielle Roblero MD, Linda Santoro MD, Arnulfo Macario MD, Megha Sanon MD,  Ann Soto MD, Noemi Coyle MD, Kane Cruz, DO, Latasha Rm MD,  Pilo Witt MD, Darya Valladares MD, Phoenix Purdy, DO, Sulaiman Lucero MD, Herrera Cervantes MD, Maverick Almonte MD, Lidia Unger MD, Fátima Mccrary MD, Ricky Reed MD, Malina Harris, DO, Paulie Sultana MD, Juan Crain MD, Cristina Smith, CNP,  Radha Cortes, CNP, Trell Adhikari, CNP, Dionna Rothman, CNP,  Denver Parks, DNP, Fanny Singh, CNP, Ed Flood, CNP, Anali Cruz, CNP, Simran Vasquez, CNP, Jazzmine García, CNP, Ernestina Vo PARolandoC, Juan Jose Daley, CNS, Shaji Sanders, DNP, Lai Berger, CNP, Quirino Koroma, CNP, Vick Elise, [facility]         [address] [facility]    Discharge Summary     Patient ID: Chantell Calvo  :  1940   MRN: [id number]     ACCOUNT:  [de-identified]   Patient's PCP: Guillaume Woodruff  Admit Date: 2022   Discharge Date: 2022     Length of Stay: 0  Code Status:  Full Code  Admitting Physician: Darya Valladares MD  Discharge Physician: Darya Valladares MD     Active Discharge Diagnoses:     Hospital Problem Lists:  Principal Problem:    Hematuria  Active Problems:    Atrial fibrillation [facility])    Benign prostatic hyperplasia with urinary frequency    Obesity    Type 2 diabetes mellitus with diabetic polyneuropathy ([facility]    Malignant neoplasm of overlapping sites of bladder [facility])  Resolved Problems:    * No resolved hospital problems. *      Admission Condition:  good     Discharged Condition: good    Hospital Stay:     Hospital Course:  Chantell Calvo is a [de-identified] y.o. male with a past medical history of bladder mass who presented to this facility for elective bladder mass resection and instillation of chemotherapy into the bladder. The procedure was without complication. The patient was admitted to internal medicine for post-operative management and continuous bladder irrigation. The patient had an uncomplicated post-operative course. He is discharged home today (9/24) in stable condition. He is instructed to follow-up with Dr. Xavier Medina on 9/26. The patient is being discharged with casey in place. He is advised to hold coumadin until 9/27. Significant therapeutic interventions: Bladder mass removal per urology     Significant Diagnostic Studies:   Labs / Micro:  CBC:   Lab Results   Component Value Date/Time    WBC 10.8 09/24/2022 04:18 AM    RBC 3.40 09/24/2022 04:18 AM    HGB 10.3 09/24/2022 04:18 AM    HCT 31.7 09/24/2022 04:18 AM    MCV 93.2 09/24/2022 04:18 AM    MCH 30.3 09/24/2022 04:18 AM    MCHC 32.5 09/24/2022 04:18 AM    RDW 14.6 09/24/2022 04:18 AM     09/24/2022 04:18 AM       Radiology:  No results found. Consultations:    Consults:     Final Specialist Recommendations/Findings:   None      The patient was seen and examined on day of discharge and this discharge summary is in conjunction with any daily progress note from day of discharge. Discharge plan:     Disposition: Home    Physician Follow Up: Follow-up with urology on 9/26     Requiring Further Evaluation/Follow Up POST HOSPITALIZATION/Incidental Findings: Patient will need to follow-up with urology on 9/26 as scheduled. Diet: regular diet    Activity: As tolerated    Instructions to Patient: Follow-up with urology on 9/26. Hold coumadin until 9/27.      Discharge Medications:      Medication List        CONTINUE taking these medications      amLODIPine 5 MG tablet  Commonly known as: NORVASC     atorvastatin 80 MG tablet  Commonly known as: LIPITOR     finasteride 5 MG tablet  Commonly known as: PROSCAR     furosemide 40 MG tablet  Commonly known as: LASIX     glimepiride 2 MG tablet  Commonly known as: AMARYL     hydrALAZINE 50 MG tablet  Commonly known as: APRESOLINE     lisinopril 20 MG tablet  Commonly known as: PRINIVIL;ZESTRIL     metFORMIN 1000 MG tablet  Commonly known as: GLUCOPHAGE     potassium chloride 10 MEQ extended release tablet  Commonly known as: KLOR-CON M     tamsulosin 0.4 MG capsule  Commonly known as: FLOMAX  take 1 capsule by mouth once daily     vitamin B-12 1000 MCG tablet  Commonly known as: CYANOCOBALAMIN            STOP taking these medications      bisacodyl 5 MG EC tablet  Generic drug: bisacodyl     warfarin 5 MG tablet  Commonly known as: COUMADIN              No discharge procedures on file. Time Spent on discharge is  15 mins in patient examination, evaluation, counseling as well as medication reconciliation, prescriptions for required medications, discharge plan and follow up. Electronically signed by   Castillo Prajapati MD  9/24/2022  8:53 AM      Thank you Dr. Dwayne Chua for the opportunity to be involved in this patient's care.

## 2022-09-28 LAB — SURGICAL PATHOLOGY REPORT: NORMAL

## 2022-11-21 ENCOUNTER — OFFICE VISIT (OUTPATIENT)
Dept: CARDIOLOGY CLINIC | Age: 82
End: 2022-11-21
Payer: MEDICARE

## 2022-11-21 VITALS
BODY MASS INDEX: 43.12 KG/M2 | SYSTOLIC BLOOD PRESSURE: 150 MMHG | HEIGHT: 71 IN | WEIGHT: 308 LBS | DIASTOLIC BLOOD PRESSURE: 72 MMHG | HEART RATE: 68 BPM

## 2022-11-21 DIAGNOSIS — I48.91 ATRIAL FIBRILLATION, UNSPECIFIED TYPE (HCC): Primary | ICD-10-CM

## 2022-11-21 PROCEDURE — 1036F TOBACCO NON-USER: CPT | Performed by: INTERNAL MEDICINE

## 2022-11-21 PROCEDURE — 1123F ACP DISCUSS/DSCN MKR DOCD: CPT | Performed by: INTERNAL MEDICINE

## 2022-11-21 PROCEDURE — G8417 CALC BMI ABV UP PARAM F/U: HCPCS | Performed by: INTERNAL MEDICINE

## 2022-11-21 PROCEDURE — G8484 FLU IMMUNIZE NO ADMIN: HCPCS | Performed by: INTERNAL MEDICINE

## 2022-11-21 PROCEDURE — 99214 OFFICE O/P EST MOD 30 MIN: CPT | Performed by: INTERNAL MEDICINE

## 2022-11-21 PROCEDURE — G8428 CUR MEDS NOT DOCUMENT: HCPCS | Performed by: INTERNAL MEDICINE

## 2022-11-21 RX ORDER — WARFARIN SODIUM 5 MG/1
5 TABLET ORAL DAILY
COMMUNITY

## 2022-11-21 NOTE — PROGRESS NOTES
[facility] [address] [place] [address]  Dept: [phone]  Dept Fax: [phone]  Loc: [phone]    Visit Date: 11/21/2022    Mr. Ryan Brantley is a [de-identified] y.o. male  who presented for:  Chief Complaint   Patient presents with    Check-Up    Atrial Fibrillation       HPI:   [de-identified] yo M c hx of Afib on coumadin, bladder cancer, Obesity, DM, ?cardiomyopathy is here for follow up. Patient has bladder mass resection. .  Patient apparently had a stress test in [facility] but the results are not available to me. Patient walks with walker. His wife recently passed away due to pneuamonia in [address] [place] [address].  Denies any chest pain, sob, palpitations, lorthopnea, PND or pedal edema. EKG shows Afib slow heart rate with PVCs.             Current Outpatient Medications:     bisacodyl (DULCOLAX) 5 MG EC tablet, Take 5 mg by mouth daily as needed for Constipation, Disp: , Rfl:     warfarin (COUMADIN) 5 MG tablet, Take 5 mg by mouth daily Follows coumadin clinic, Disp: , Rfl:     amLODIPine (NORVASC) 5 MG tablet, Take 5 mg by mouth daily, Disp: , Rfl:     atorvastatin (LIPITOR) 80 MG tablet, Take 80 mg by mouth daily, Disp: , Rfl:     finasteride (PROSCAR) 5 MG tablet, Take 5 mg by mouth daily, Disp: , Rfl:     furosemide (LASIX) 40 MG tablet, Take 40 mg by mouth daily, Disp: , Rfl:     glimepiride (AMARYL) 2 MG tablet, Take 2 mg by mouth, Disp: , Rfl:     hydrALAZINE (APRESOLINE) 50 MG tablet, Take 50 mg by mouth 2 times daily, Disp: , Rfl:     lisinopril (PRINIVIL;ZESTRIL) 20 MG tablet, Take 20 mg by mouth 2 times daily , Disp: , Rfl:     metFORMIN (GLUCOPHAGE) 1000 MG tablet, Take 1,000 mg by mouth, Disp: , Rfl:     potassium chloride (KLOR-CON M) 10 MEQ extended release tablet, TAKE 1 TABLET (10 MEQ TOTAL) BY MOUTH DAILY WITH FOOD, Disp: , Rfl:     vitamin B-12 (CYANOCOBALAMIN) 1000 MCG tablet, Take 1,000 mcg by mouth daily, Disp: , Rfl:     tamsulosin (FLOMAX) 0.4 MG capsule, take 1 capsule by mouth once daily, Disp: 30 capsule, Rfl: 11    Past Medical History  Thierry Wang  has a past medical history of Atrial fibrillation ([facility] Benign prostatic hyperplasia with urinary frequency, Cancer ([address] Cardiomyopathy ([facility] CKD (chronic kidney disease), Coronary atherosclerosis, Gout, Morbid obesity with body mass index (BMI) of 45.0 to 49.9 in adult [facility]), Obesity, Pulmonary hypertension ([facility] PVD (peripheral vascular disease) ([facility] Right ventricular dilation, Tricuspid regurgitation, and Type 2 diabetes mellitus with diabetic polyneuropathy ([facility] Social History  Thierry Wang  reports that he has never smoked. He has never used smokeless tobacco. He reports that he does not currently use alcohol. He reports that he does not use drugs. Family History  Kevin family history is not on file. Past Surgical History   Past Surgical History:   Procedure Laterality Date    BACK SURGERY      three times    COLONOSCOPY N/A     [facility]    COLONOSCOPY W/ BIOPSIES N/A 09/12/2022    diverticulosis, x 4 adenoma polyps removed, done @ [facility] by Dr. Reinaldo Laureano. CYSTOSCOPY      CYSTOSCOPY      w bladder biospy    CYSTOSCOPY  09/23/2022    CYSTOSCOPY TRANSURETHRAL RESECTION BLADDER WITH INSTILLATION GEMCITIBINE    CYSTOSCOPY N/A 9/23/2022    CYSTOSCOPY TRANSURETHRAL RESECTION BLADDER WITH INSTILLATION GEMCITIBINE performed by Deepthi Augustine MD at [address]     TRANSURETHRAL RESECTION OF BLADDER TUMOR         Subjective:     REVIEW OF SYSTEMS  Constitutional: denies sweats, chills and fever  HENT: denies  congestion, sinus pressure, sneezing and sore throat. Eyes: denies  pain, discharge, redness and itching.    Respiratory: denies apnea, cough  Gastrointestinal: denies blood in stool, constipation, diarrhea   Endocrine: denies cold intolerance, heat intolerance, polydipsia. Genitourinary: denies dysuria, enuresis, flank pain and hematuria. Musculoskeletal: denies arthralgias, joint swelling and neck pain. Neurological: denies numbness and headaches. Psychiatric/Behavioral: denies agitation, confusion, decreased concentration and dysphoric mood    All others reviewed and are negative. Objective:     BP (!) 150/72   Pulse 68   Ht 5' 11\" (1.803 m)   Wt (!) 308 lb (139.7 kg)   BMI 42.96 kg/m²     Wt Readings from Last 3 Encounters:   11/21/22 (!) 308 lb (139.7 kg)   09/23/22 (!) 311 lb (141.1 kg)   07/18/22 (!) 315 lb 6.4 oz (143.1 kg)     BP Readings from Last 3 Encounters:   11/21/22 (!) 150/72   09/24/22 123/66   08/30/22 (!) 142/70       PHYSICAL EXAM  Constitutional: Oriented to person, place, and time. Obese  HENT:   Head: Normocephalic and atraumatic. Eyes: EOM are normal. Pupils are equal, round, and reactive to light. Neck: Normal range of motion. Neck supple. No JVD present. Cardiovascular: Normal rate , normal heart sounds and intact distal pulses. Pulmonary/Chest: Effort normal and breath sounds normal. No respiratory distress. No wheezes. No rales. Abdominal: Soft. Bowel sounds are normal. No distension. There is no tenderness. Musculoskeletal: Normal range of motion. No edema. Neurological: Alert and oriented to person, place, and time. No cranial nerve deficit. Coordination normal.   Skin: Skin is warm and dry. Psychiatric: Normal mood and affect.        No results found for: CKTOTAL, CKMB, CKMBINDEX    Lab Results   Component Value Date/Time    WBC 10.8 09/24/2022 04:18 AM    RBC 3.40 09/24/2022 04:18 AM    HGB 10.3 09/24/2022 04:18 AM    HCT 31.7 09/24/2022 04:18 AM    MCV 93.2 09/24/2022 04:18 AM    MCH 30.3 09/24/2022 04:18 AM    MCHC 32.5 09/24/2022 04:18 AM    RDW 14.6 09/24/2022 04:18 AM     09/24/2022 04:18 AM    MPV 9.2 09/24/2022 04:18 AM       Lab Results   Component Value Date/Time     09/24/2022 04:18 AM    K 5.7 09/24/2022 04:18 AM     09/24/2022 04:18 AM    CO2 19 09/24/2022 04:18 AM    BUN 33 09/24/2022 04:18 AM    CREATININE 1.50 09/24/2022 04:18 AM    CALCIUM 8.4 09/24/2022 04:18 AM    GFRAA 55 09/24/2022 04:18 AM    LABGLOM 45 09/24/2022 04:18 AM    GLUCOSE 322 09/24/2022 04:18 AM       No results found for: ALKPHOS, ALT, AST, PROT, BILITOT, BILIDIR, IBILI, LABALBU    No results found for: MG    Lab Results   Component Value Date    INR 1.6 09/23/2022    PROTIME 16.1 (H) 09/23/2022         No results found for: LABA1C    No results found for: TRIG, HDL, LDLCALC, LDLDIRECT, LABVLDL    No results found for: TSH      Testing Reviewed:      I haveindividually reviewed the below cardiac tests    EKG:    ECHO: No results found for this or any previous visit. STRESS:    CATH:    Assessment/Plan       Diagnosis Orders   1. Atrial fibrillation, unspecified type ([facility]            Afib on coumadin  Recent bladder mass resection  DM  Bladder cancer    Reviwed EKG  Reviewed stres test from [address]  At present patient denies any anginal or heart failrue sympmots  Walks with a walker  Had poor exercise tolerance  The patient is asked to make an attempt to improve diet and exercise patterns to aid in medical management of this problem. Advised more plant based nutrition/meditarrean diet   Advised patient to call office or seek immediate medical attention if there is any new onset of  any chest pain, sob, palpitations, lightheadedness, dizziness, orthopnea, PND or pedal edema. All medication side effects were discussed in details. Thank youfor allowing me to participate in the care of this patient. Please do not hesitate to contact me for any further questions. Return in about 6 months (around 5/21/2023), or if symptoms worsen or fail to improve, for Review testing, Regular follow up.        Electronically signed by Abhijit Fierro MD [facility]  11/21/2022 at 10:22 AM EDT

## 2023-01-09 ENCOUNTER — TELEPHONE (OUTPATIENT)
Dept: SURGERY | Age: 83
End: 2023-01-09

## 2023-01-09 NOTE — TELEPHONE ENCOUNTER
Pt stopped by the [facility] office stating he had received a call to set up a procedure in [place] and he wants to hold off because he has a lot going on with Dr. Naomi David office and feels with the biopsy report from his previous Csope , which he is under the impression everything was fine. I told PT I would put a note in and let his nurse know his feelings on this but if the Dr feels it is important for him to have another procedure that he would be contacted. Pt understood.

## 2023-01-09 NOTE — TELEPHONE ENCOUNTER
Attempted to contact patient and VM is full at this time. Will continue and try and reach patient and discuss 6 month repeat colonoscopy that is due in March 2023. Dr. Binta Lorenz recommended short term follow up due to poor bowel prep and the type and amount of polyps found in last colonoscopy on 9/12/22 @ [facility].

## 2023-01-11 NOTE — TELEPHONE ENCOUNTER
Spoke with patient and informed him that he is not due for repeat colonoscopy until March 2023 and reasoning to why Dr. Kj Marion recommended  a short term follow up. Patient voiced understanding and would like to hold off on repeating colonoscopy for now due to having a lot going on with his bladder cancer and Dr. Afia Siegel office. Instructed patient to contact our office when he is ready to schedule repeat colonoscopy.

## 2023-01-24 ENCOUNTER — TELEPHONE (OUTPATIENT)
Dept: CARDIOLOGY CLINIC | Age: 83
End: 2023-01-24

## 2023-01-24 NOTE — TELEPHONE ENCOUNTER
Pre op Risk Assessment    Procedure cystoscopy with bladder biopsy with fulguration, possible urolift  Physician Dr. Alexis Long  Date of surgery/procedure 01/31/2023    Last OV 11/21/2022  Last Stress 08/23/2022  Last Echo 06/11/2019  Last Cath none in epic  Last Stent none in epic  Is patient on blood thinners Coumadin  Hold Meds/how many days 5 days    Fax: [phone]

## 2023-01-25 NOTE — TELEPHONE ENCOUNTER
Dr Paty Hannah office called again about cardiac clearance. Stated that they needed to know today if pt can withhold coumadin since procedure is scheduled for 5 days out.

## 2023-02-19 ENCOUNTER — HOSPITAL ENCOUNTER (INPATIENT)
Age: 83
LOS: 9 days | Discharge: HOME HEALTH | DRG: 665 | End: 2023-02-28
Attending: EMERGENCY MEDICINE | Admitting: INTERNAL MEDICINE
Payer: MEDICARE

## 2023-02-19 DIAGNOSIS — N32.89 BLADDER MASS: ICD-10-CM

## 2023-02-19 DIAGNOSIS — R33.9 URINARY RETENTION: Primary | ICD-10-CM

## 2023-02-19 DIAGNOSIS — N17.9 AKI (ACUTE KIDNEY INJURY) (HCC): ICD-10-CM

## 2023-02-19 PROBLEM — C67.9 BLADDER CARCINOMA (HCC): Chronic | Status: ACTIVE | Noted: 2023-02-19

## 2023-02-19 PROBLEM — E87.5 HYPERKALEMIA: Status: ACTIVE | Noted: 2023-02-19

## 2023-02-19 PROBLEM — R31.0 GROSS HEMATURIA: Status: ACTIVE | Noted: 2023-02-19

## 2023-02-19 PROBLEM — K52.9 COLITIS: Status: ACTIVE | Noted: 2023-02-19

## 2023-02-19 PROBLEM — Z79.01 ON CONTINUOUS ORAL ANTICOAGULATION: Status: ACTIVE | Noted: 2023-02-19

## 2023-02-19 PROBLEM — R33.8 CLOT RETENTION OF URINE: Status: ACTIVE | Noted: 2023-02-19

## 2023-02-19 LAB
ABSOLUTE EOS #: 0 K/UL (ref 0–0.4)
ABSOLUTE IMMATURE GRANULOCYTE: 0 K/UL (ref 0–0.3)
ABSOLUTE LYMPH #: 0.4 K/UL (ref 1–4.8)
ABSOLUTE MONO #: 1.58 K/UL (ref 0.1–0.8)
ANION GAP SERPL CALCULATED.3IONS-SCNC: 15 MMOL/L (ref 9–17)
ANION GAP SERPL CALCULATED.3IONS-SCNC: 17 MMOL/L (ref 9–17)
BASOPHILS # BLD: 0 % (ref 0–2)
BASOPHILS ABSOLUTE: 0 K/UL (ref 0–0.2)
BUN SERPL-MCNC: 31 MG/DL (ref 8–23)
BUN SERPL-MCNC: 32 MG/DL (ref 8–23)
CALCIUM SERPL-MCNC: 8.2 MG/DL (ref 8.6–10.4)
CALCIUM SERPL-MCNC: 8.8 MG/DL (ref 8.6–10.4)
CHLORIDE SERPL-SCNC: 102 MMOL/L (ref 98–107)
CHLORIDE SERPL-SCNC: 105 MMOL/L (ref 98–107)
CO2 SERPL-SCNC: 18 MMOL/L (ref 20–31)
CO2 SERPL-SCNC: 19 MMOL/L (ref 20–31)
CREAT SERPL-MCNC: 2.66 MG/DL (ref 0.7–1.2)
CREAT SERPL-MCNC: 3.6 MG/DL (ref 0.7–1.2)
EOSINOPHILS RELATIVE PERCENT: 0 % (ref 1–4)
GFR SERPL CREATININE-BSD FRML MDRD: 16 ML/MIN/1.73M2
GFR SERPL CREATININE-BSD FRML MDRD: 24 ML/MIN/1.73M2
GLUCOSE BLD-MCNC: 162 MG/DL (ref 75–110)
GLUCOSE BLD-MCNC: 181 MG/DL (ref 75–110)
GLUCOSE SERPL-MCNC: 191 MG/DL (ref 70–99)
GLUCOSE SERPL-MCNC: 202 MG/DL (ref 70–99)
HCT VFR BLD AUTO: 29.4 % (ref 40.7–50.3)
HGB BLD-MCNC: 8.9 G/DL (ref 13–17)
IMMATURE GRANULOCYTES: 0 %
LYMPHOCYTES # BLD: 2 % (ref 24–44)
MCH RBC QN AUTO: 29.2 PG (ref 25.2–33.5)
MCHC RBC AUTO-ENTMCNC: 30.3 G/DL (ref 28.4–34.8)
MCV RBC AUTO: 96.4 FL (ref 82.6–102.9)
MONOCYTES # BLD: 8 % (ref 1–7)
MORPHOLOGY: ABNORMAL
NRBC AUTOMATED: 0 PER 100 WBC
PDW BLD-RTO: 13.5 % (ref 11.8–14.4)
PLATELET # BLD AUTO: 322 K/UL (ref 138–453)
PMV BLD AUTO: 10.5 FL (ref 8.1–13.5)
POTASSIUM SERPL-SCNC: 5 MMOL/L (ref 3.7–5.3)
POTASSIUM SERPL-SCNC: 5.5 MMOL/L (ref 3.7–5.3)
RBC # BLD: 3.05 M/UL (ref 4.21–5.77)
SEG NEUTROPHILS: 90 % (ref 36–66)
SEGMENTED NEUTROPHILS ABSOLUTE COUNT: 17.82 K/UL (ref 1.8–7.7)
SODIUM SERPL-SCNC: 138 MMOL/L (ref 135–144)
SODIUM SERPL-SCNC: 138 MMOL/L (ref 135–144)
WBC # BLD AUTO: 19.8 K/UL (ref 3.5–11.3)

## 2023-02-19 PROCEDURE — 96374 THER/PROPH/DIAG INJ IV PUSH: CPT

## 2023-02-19 PROCEDURE — 1200000000 HC SEMI PRIVATE

## 2023-02-19 PROCEDURE — 6360000002 HC RX W HCPCS

## 2023-02-19 PROCEDURE — 99223 1ST HOSP IP/OBS HIGH 75: CPT | Performed by: INTERNAL MEDICINE

## 2023-02-19 PROCEDURE — 51702 INSERT TEMP BLADDER CATH: CPT

## 2023-02-19 PROCEDURE — 85025 COMPLETE CBC W/AUTO DIFF WBC: CPT

## 2023-02-19 PROCEDURE — 6360000002 HC RX W HCPCS: Performed by: NURSE PRACTITIONER

## 2023-02-19 PROCEDURE — 6370000000 HC RX 637 (ALT 250 FOR IP)

## 2023-02-19 PROCEDURE — 94761 N-INVAS EAR/PLS OXIMETRY MLT: CPT

## 2023-02-19 PROCEDURE — 6370000000 HC RX 637 (ALT 250 FOR IP): Performed by: INTERNAL MEDICINE

## 2023-02-19 PROCEDURE — 82947 ASSAY GLUCOSE BLOOD QUANT: CPT

## 2023-02-19 PROCEDURE — 2580000003 HC RX 258: Performed by: INTERNAL MEDICINE

## 2023-02-19 PROCEDURE — 6370000000 HC RX 637 (ALT 250 FOR IP): Performed by: NURSE PRACTITIONER

## 2023-02-19 PROCEDURE — 36415 COLL VENOUS BLD VENIPUNCTURE: CPT

## 2023-02-19 PROCEDURE — 96375 TX/PRO/DX INJ NEW DRUG ADDON: CPT

## 2023-02-19 PROCEDURE — 2580000003 HC RX 258: Performed by: NURSE PRACTITIONER

## 2023-02-19 PROCEDURE — 6370000000 HC RX 637 (ALT 250 FOR IP): Performed by: STUDENT IN AN ORGANIZED HEALTH CARE EDUCATION/TRAINING PROGRAM

## 2023-02-19 PROCEDURE — 80048 BASIC METABOLIC PNL TOTAL CA: CPT

## 2023-02-19 PROCEDURE — 99285 EMERGENCY DEPT VISIT HI MDM: CPT

## 2023-02-19 RX ORDER — CHOLECALCIFEROL (VITAMIN D3) 125 MCG
1000 CAPSULE ORAL DAILY
Status: DISCONTINUED | OUTPATIENT
Start: 2023-02-19 | End: 2023-02-28 | Stop reason: HOSPADM

## 2023-02-19 RX ORDER — POLYETHYLENE GLYCOL 3350 17 G/17G
17 POWDER, FOR SOLUTION ORAL DAILY PRN
Status: DISCONTINUED | OUTPATIENT
Start: 2023-02-19 | End: 2023-02-28 | Stop reason: HOSPADM

## 2023-02-19 RX ORDER — SODIUM CHLORIDE 9 MG/ML
INJECTION, SOLUTION INTRAVENOUS CONTINUOUS
Status: DISCONTINUED | OUTPATIENT
Start: 2023-02-19 | End: 2023-02-24

## 2023-02-19 RX ORDER — METRONIDAZOLE 500 MG/1
500 TABLET ORAL EVERY 8 HOURS SCHEDULED
Status: DISCONTINUED | OUTPATIENT
Start: 2023-02-19 | End: 2023-02-28

## 2023-02-19 RX ORDER — SODIUM CHLORIDE 0.9 % (FLUSH) 0.9 %
10 SYRINGE (ML) INJECTION PRN
Status: DISCONTINUED | OUTPATIENT
Start: 2023-02-19 | End: 2023-02-28 | Stop reason: HOSPADM

## 2023-02-19 RX ORDER — GLIPIZIDE 5 MG/1
5 TABLET ORAL
Status: DISCONTINUED | OUTPATIENT
Start: 2023-02-19 | End: 2023-02-24

## 2023-02-19 RX ORDER — SODIUM CHLORIDE 9 MG/ML
INJECTION, SOLUTION INTRAVENOUS PRN
Status: DISCONTINUED | OUTPATIENT
Start: 2023-02-19 | End: 2023-02-28 | Stop reason: HOSPADM

## 2023-02-19 RX ORDER — LISINOPRIL 20 MG/1
20 TABLET ORAL 2 TIMES DAILY
Status: DISCONTINUED | OUTPATIENT
Start: 2023-02-19 | End: 2023-02-24

## 2023-02-19 RX ORDER — ACETAMINOPHEN 325 MG/1
650 TABLET ORAL EVERY 6 HOURS PRN
Status: DISCONTINUED | OUTPATIENT
Start: 2023-02-19 | End: 2023-02-28 | Stop reason: HOSPADM

## 2023-02-19 RX ORDER — ATROPA BELLADONNA AND OPIUM 16.2; 6 MG/1; MG/1
60 SUPPOSITORY RECTAL EVERY 8 HOURS PRN
Status: DISCONTINUED | OUTPATIENT
Start: 2023-02-19 | End: 2023-02-28 | Stop reason: HOSPADM

## 2023-02-19 RX ORDER — METRONIDAZOLE 500 MG/1
500 TABLET ORAL 3 TIMES DAILY
Status: ON HOLD | COMMUNITY
Start: 2023-02-16 | End: 2023-02-27 | Stop reason: HOSPADM

## 2023-02-19 RX ORDER — ONDANSETRON 4 MG/1
4 TABLET, ORALLY DISINTEGRATING ORAL EVERY 8 HOURS PRN
Status: DISCONTINUED | OUTPATIENT
Start: 2023-02-19 | End: 2023-02-28 | Stop reason: HOSPADM

## 2023-02-19 RX ORDER — POTASSIUM CHLORIDE 7.45 MG/ML
10 INJECTION INTRAVENOUS PRN
Status: DISCONTINUED | OUTPATIENT
Start: 2023-02-19 | End: 2023-02-21

## 2023-02-19 RX ORDER — ATROPA BELLADONNA AND OPIUM 16.2; 6 MG/1; MG/1
60 SUPPOSITORY RECTAL ONCE
Status: COMPLETED | OUTPATIENT
Start: 2023-02-19 | End: 2023-02-19

## 2023-02-19 RX ORDER — CEPHALEXIN 500 MG/1
500 CAPSULE ORAL 3 TIMES DAILY
Status: ON HOLD | COMMUNITY
Start: 2023-02-16 | End: 2023-02-27 | Stop reason: HOSPADM

## 2023-02-19 RX ORDER — INSULIN LISPRO 100 [IU]/ML
0-8 INJECTION, SOLUTION INTRAVENOUS; SUBCUTANEOUS
Status: DISCONTINUED | OUTPATIENT
Start: 2023-02-19 | End: 2023-02-28 | Stop reason: HOSPADM

## 2023-02-19 RX ORDER — HYDRALAZINE HYDROCHLORIDE 50 MG/1
50 TABLET, FILM COATED ORAL 2 TIMES DAILY
Status: DISCONTINUED | OUTPATIENT
Start: 2023-02-19 | End: 2023-02-24

## 2023-02-19 RX ORDER — ACETAMINOPHEN 650 MG/1
650 SUPPOSITORY RECTAL EVERY 6 HOURS PRN
Status: DISCONTINUED | OUTPATIENT
Start: 2023-02-19 | End: 2023-02-28 | Stop reason: HOSPADM

## 2023-02-19 RX ORDER — INSULIN LISPRO 100 [IU]/ML
0-4 INJECTION, SOLUTION INTRAVENOUS; SUBCUTANEOUS NIGHTLY
Status: DISCONTINUED | OUTPATIENT
Start: 2023-02-19 | End: 2023-02-28 | Stop reason: HOSPADM

## 2023-02-19 RX ORDER — AMLODIPINE BESYLATE 5 MG/1
5 TABLET ORAL DAILY
Status: DISCONTINUED | OUTPATIENT
Start: 2023-02-19 | End: 2023-02-24

## 2023-02-19 RX ORDER — DEXTROSE MONOHYDRATE 100 MG/ML
INJECTION, SOLUTION INTRAVENOUS CONTINUOUS PRN
Status: DISCONTINUED | OUTPATIENT
Start: 2023-02-19 | End: 2023-02-21

## 2023-02-19 RX ORDER — MORPHINE SULFATE 2 MG/ML
2 INJECTION, SOLUTION INTRAMUSCULAR; INTRAVENOUS EVERY 4 HOURS PRN
Status: DISCONTINUED | OUTPATIENT
Start: 2023-02-19 | End: 2023-02-28 | Stop reason: HOSPADM

## 2023-02-19 RX ORDER — POTASSIUM CHLORIDE 20 MEQ/1
10 TABLET, EXTENDED RELEASE ORAL DAILY
Status: DISCONTINUED | OUTPATIENT
Start: 2023-02-19 | End: 2023-02-24

## 2023-02-19 RX ORDER — SODIUM CHLORIDE 0.9 % (FLUSH) 0.9 %
5-40 SYRINGE (ML) INJECTION EVERY 12 HOURS SCHEDULED
Status: DISCONTINUED | OUTPATIENT
Start: 2023-02-19 | End: 2023-02-28 | Stop reason: HOSPADM

## 2023-02-19 RX ORDER — FINASTERIDE 5 MG/1
5 TABLET, FILM COATED ORAL DAILY
Status: DISCONTINUED | OUTPATIENT
Start: 2023-02-19 | End: 2023-02-28 | Stop reason: HOSPADM

## 2023-02-19 RX ORDER — ATORVASTATIN CALCIUM 80 MG/1
80 TABLET, FILM COATED ORAL DAILY
Status: DISCONTINUED | OUTPATIENT
Start: 2023-02-19 | End: 2023-02-28 | Stop reason: HOSPADM

## 2023-02-19 RX ORDER — DEXTROSE MONOHYDRATE 100 MG/ML
INJECTION, SOLUTION INTRAVENOUS CONTINUOUS PRN
Status: DISCONTINUED | OUTPATIENT
Start: 2023-02-19 | End: 2023-02-28 | Stop reason: HOSPADM

## 2023-02-19 RX ORDER — MORPHINE SULFATE 4 MG/ML
4 INJECTION, SOLUTION INTRAMUSCULAR; INTRAVENOUS ONCE
Status: COMPLETED | OUTPATIENT
Start: 2023-02-19 | End: 2023-02-19

## 2023-02-19 RX ORDER — CEPHALEXIN 500 MG/1
500 CAPSULE ORAL EVERY 8 HOURS SCHEDULED
Status: DISCONTINUED | OUTPATIENT
Start: 2023-02-19 | End: 2023-02-21

## 2023-02-19 RX ORDER — ONDANSETRON 2 MG/ML
4 INJECTION INTRAMUSCULAR; INTRAVENOUS EVERY 6 HOURS PRN
Status: DISCONTINUED | OUTPATIENT
Start: 2023-02-19 | End: 2023-02-28 | Stop reason: HOSPADM

## 2023-02-19 RX ORDER — TAMSULOSIN HYDROCHLORIDE 0.4 MG/1
0.4 CAPSULE ORAL DAILY
Status: DISCONTINUED | OUTPATIENT
Start: 2023-02-19 | End: 2023-02-28 | Stop reason: HOSPADM

## 2023-02-19 RX ORDER — POTASSIUM CHLORIDE 20 MEQ/1
40 TABLET, EXTENDED RELEASE ORAL PRN
Status: DISCONTINUED | OUTPATIENT
Start: 2023-02-19 | End: 2023-02-21

## 2023-02-19 RX ORDER — FENTANYL CITRATE 50 UG/ML
100 INJECTION, SOLUTION INTRAMUSCULAR; INTRAVENOUS ONCE
Status: COMPLETED | OUTPATIENT
Start: 2023-02-19 | End: 2023-02-19

## 2023-02-19 RX ORDER — MAGNESIUM SULFATE 1 G/100ML
1000 INJECTION INTRAVENOUS PRN
Status: DISCONTINUED | OUTPATIENT
Start: 2023-02-19 | End: 2023-02-21

## 2023-02-19 RX ADMIN — AMLODIPINE BESYLATE 5 MG: 5 TABLET ORAL at 08:29

## 2023-02-19 RX ADMIN — METFORMIN HYDROCHLORIDE 1000 MG: 500 TABLET ORAL at 08:29

## 2023-02-19 RX ADMIN — MORPHINE SULFATE 2 MG: 2 INJECTION, SOLUTION INTRAMUSCULAR; INTRAVENOUS at 12:26

## 2023-02-19 RX ADMIN — SODIUM CHLORIDE, PRESERVATIVE FREE 10 ML: 5 INJECTION INTRAVENOUS at 08:30

## 2023-02-19 RX ADMIN — HYDRALAZINE HYDROCHLORIDE 50 MG: 50 TABLET, FILM COATED ORAL at 20:03

## 2023-02-19 RX ADMIN — POTASSIUM CHLORIDE 10 MEQ: 1500 TABLET, EXTENDED RELEASE ORAL at 08:30

## 2023-02-19 RX ADMIN — ATROPA BELLADONNA AND OPIUM 60 MG: 16.2; 6 SUPPOSITORY RECTAL at 02:36

## 2023-02-19 RX ADMIN — MORPHINE SULFATE 2 MG: 2 INJECTION, SOLUTION INTRAMUSCULAR; INTRAVENOUS at 08:26

## 2023-02-19 RX ADMIN — Medication 1000 MCG: at 08:30

## 2023-02-19 RX ADMIN — METRONIDAZOLE 500 MG: 500 TABLET, FILM COATED ORAL at 23:11

## 2023-02-19 RX ADMIN — CEPHALEXIN 500 MG: 500 CAPSULE ORAL at 23:11

## 2023-02-19 RX ADMIN — ATROPA BELLADONNA AND OPIUM 60 MG: 16.2; 6 SUPPOSITORY RECTAL at 09:51

## 2023-02-19 RX ADMIN — METRONIDAZOLE 500 MG: 500 TABLET, FILM COATED ORAL at 14:50

## 2023-02-19 RX ADMIN — ATORVASTATIN CALCIUM 80 MG: 80 TABLET, FILM COATED ORAL at 08:30

## 2023-02-19 RX ADMIN — HYDRALAZINE HYDROCHLORIDE 50 MG: 50 TABLET, FILM COATED ORAL at 08:29

## 2023-02-19 RX ADMIN — FINASTERIDE 5 MG: 5 TABLET, FILM COATED ORAL at 08:29

## 2023-02-19 RX ADMIN — TAMSULOSIN HYDROCHLORIDE 0.4 MG: 0.4 CAPSULE ORAL at 08:30

## 2023-02-19 RX ADMIN — MORPHINE SULFATE 4 MG: 4 INJECTION INTRAVENOUS at 03:05

## 2023-02-19 RX ADMIN — GLIPIZIDE 5 MG: 5 TABLET ORAL at 08:29

## 2023-02-19 RX ADMIN — FENTANYL CITRATE 100 MCG: 50 INJECTION INTRAMUSCULAR; INTRAVENOUS at 01:37

## 2023-02-19 RX ADMIN — CEPHALEXIN 500 MG: 500 CAPSULE ORAL at 14:50

## 2023-02-19 RX ADMIN — LISINOPRIL 20 MG: 20 TABLET ORAL at 08:29

## 2023-02-19 RX ADMIN — SODIUM CHLORIDE: 9 INJECTION, SOLUTION INTRAVENOUS at 16:44

## 2023-02-19 ASSESSMENT — PAIN SCALES - GENERAL
PAINLEVEL_OUTOF10: 3
PAINLEVEL_OUTOF10: 10
PAINLEVEL_OUTOF10: 9
PAINLEVEL_OUTOF10: 8
PAINLEVEL_OUTOF10: 0
PAINLEVEL_OUTOF10: 4
PAINLEVEL_OUTOF10: 7
PAINLEVEL_OUTOF10: 5
PAINLEVEL_OUTOF10: 5
PAINLEVEL_OUTOF10: 0
PAINLEVEL_OUTOF10: 3
PAINLEVEL_OUTOF10: 3
PAINLEVEL_OUTOF10: 4

## 2023-02-19 ASSESSMENT — PAIN - FUNCTIONAL ASSESSMENT
PAIN_FUNCTIONAL_ASSESSMENT: ACTIVITIES ARE NOT PREVENTED

## 2023-02-19 ASSESSMENT — PAIN DESCRIPTION - ONSET: ONSET: ON-GOING

## 2023-02-19 ASSESSMENT — PAIN DESCRIPTION - DESCRIPTORS
DESCRIPTORS: SPASM;CRAMPING
DESCRIPTORS: ACHING
DESCRIPTORS: ACHING;SHARP;SPASM
DESCRIPTORS: ACHING;CRAMPING
DESCRIPTORS: SPASM

## 2023-02-19 ASSESSMENT — PAIN DESCRIPTION - ORIENTATION
ORIENTATION: MID;LOWER
ORIENTATION: MID
ORIENTATION: MID;LOWER
ORIENTATION: MID;LOWER
ORIENTATION: LOWER

## 2023-02-19 ASSESSMENT — PAIN DESCRIPTION - LOCATION
LOCATION: PELVIS;PENIS
LOCATION: PELVIS;PENIS
LOCATION: GROIN
LOCATION: PENIS;OTHER (COMMENT)
LOCATION: ABDOMEN;PENIS

## 2023-02-19 ASSESSMENT — PAIN DESCRIPTION - PAIN TYPE: TYPE: ACUTE PAIN

## 2023-02-19 ASSESSMENT — PAIN DESCRIPTION - FREQUENCY: FREQUENCY: INTERMITTENT

## 2023-02-19 NOTE — ED PROVIDER NOTES
[address] [facility] ED  Emergency Department Encounter  Emergency Medicine Resident     Pt Name:Kevin Zimmer  MRN: [id number]  [place] 1940  Date of evaluation: 2/19/23  PCP:  [facility]       Chief Complaint   Patient presents with    Hematuria     Transfer from [facility] FOR JOINT DISEASES d/t hematuria. Pt with history of cystoscopy with bladder bioposy and fulguration, urolift x 4  per Dr. Molly Mcclendon on 01/31/023. Pt with urinary retention, dribbling and passing blood clots today. Pt with history of atrial fibrillation, currently on Coumadin. INR-3.71 today. Pt reports he has held his coumadin for the past three days. Pt arrives with casey catheter in place with scant amount of bright red blood. HISTORY OF PRESENT ILLNESS  (Location/Symptom, Timing/Onset, Context/Setting, Quality, Duration, Modifying Factors, Severity.)      Holland Joel is a [de-identified] y.o. male who presents with complaints of urinary retention, hematuria over the past 2 days and is status post UroLift procedure 1/31/2023 with . At OSH, patient hemoglobin stable at 9.9. Patient has history of A-fib and is usually on Coumadin but has been off of it for the last few days. Denies any fever, nausea, vomiting, chest pain, shortness of breath. At OSH, patient had 21 [employer] coudé catheter and bladder irrigation done with 3 to 400 cc of normal saline but did not have any urine outflow after that and clots and blood in the catheter. INR at OSH 3.71. Chart review, patient also with history of bladder cancer and status post BCG therapy. Has had history of recurrent hematuria.     PAST MEDICAL / SURGICAL / SOCIAL / FAMILY HISTORY      has a past medical history of Atrial fibrillation ([address] Benign prostatic hyperplasia with urinary frequency, Cancer ([address] Cardiomyopathy ([address] CKD (chronic kidney disease), Coronary atherosclerosis, Gout, Morbid obesity with body mass index (BMI) of 45.0 to 49.9 in adult ([address] Obesity, Pulmonary hypertension ([address] PVD (peripheral vascular disease) ([address] Right ventricular dilation, Tricuspid regurgitation, and Type 2 diabetes mellitus with diabetic polyneuropathy ([address] Reviewed with patient     has a past surgical history that includes back surgery; Knee Arthroplasty (Left); Colonoscopy (N/A); Colonoscopy w/ biopsies (N/A, 09/12/2022); Cystoscopy; Finger surgery; Cystoscopy; Transurethral resection of bladder tumor; Cystocopy (09/23/2022); and Cystoscopy (N/A, 9/23/2022). Reviewed with patient    Social History     Socioeconomic History    Marital status:      Spouse name: Not on file    Number of children: Not on file    Years of education: Not on file    Highest education level: Not on file   Occupational History    Not on file   Tobacco Use    Smoking status: Never    Smokeless tobacco: Never   Substance and Sexual Activity    Alcohol use: Not Currently    Drug use: Never    Sexual activity: Not on file   Other Topics Concern    Not on file   Social History Narrative    Not on file     Social Determinants of Health     Financial Resource Strain: Not on file   Food Insecurity: Not on file   Transportation Needs: Not on file   Physical Activity: Not on file   Stress: Not on file   Social Connections: Not on file   Intimate Partner Violence: Not on file   Housing Stability: Not on file       History reviewed. No pertinent family history. Allergies:  Patient has no known allergies. Home Medications:  Prior to Admission medications    Medication Sig Start Date End Date Taking?  Authorizing Provider   bisacodyl (DULCOLAX) 5 MG EC tablet Take 5 mg by mouth daily as needed for Constipation    Historical Provider, MD   warfarin (COUMADIN) 5 MG tablet Take 5 mg by mouth daily Follows coumadin clinic    Historical Provider, MD   amLODIPine (NORVASC) 5 MG tablet Take 5 mg by mouth daily 10/29/21 11/21/22  Historical Provider, MD   atorvastatin (LIPITOR) 80 MG tablet Take 80 mg by mouth daily 12/16/21 12/16/22  Historical Provider, MD   finasteride (PROSCAR) 5 MG tablet Take 5 mg by mouth daily 12/16/21 12/16/22  Historical Provider, MD   furosemide (LASIX) 40 MG tablet Take 40 mg by mouth daily 1/12/22 1/12/23  Historical Provider, MD   glimepiride (AMARYL) 2 MG tablet Take 2 mg by mouth 6/2/22 6/2/23  Historical Provider, MD   hydrALAZINE (APRESOLINE) 50 MG tablet Take 50 mg by mouth 2 times daily 11/20/21 11/21/22  Historical Provider, MD   lisinopril (PRINIVIL;ZESTRIL) 20 MG tablet Take 20 mg by mouth 2 times daily  12/9/21 12/9/22  Historical Provider, MD   metFORMIN (GLUCOPHAGE) 1000 MG tablet Take 1,000 mg by mouth 8/24/21 11/21/22  Historical Provider, MD   potassium chloride (KLOR-CON M) 10 MEQ extended release tablet TAKE 1 TABLET (10 MEQ TOTAL) BY MOUTH DAILY WITH FOOD 11/20/21 11/21/22  Historical Provider, MD   vitamin B-12 (CYANOCOBALAMIN) 1000 MCG tablet Take 1,000 mcg by mouth daily    Historical Provider, MD   tamsulosin (FLOMAX) 0.4 MG capsule take 1 capsule by mouth once daily 5/20/19   Radha Angelo MD       REVIEW OF SYSTEMS    (2-9 systems for level 4, 10 or more for level 5)      Review of Systems   Constitutional:  Negative for chills and fever. HENT:  Negative for congestion and rhinorrhea. Eyes:  Negative for photophobia and visual disturbance. Respiratory:  Negative for shortness of breath and wheezing. Cardiovascular:  Negative for chest pain and palpitations. Gastrointestinal: Positive for abdominal pain, negative for nausea and vomiting. Genitourinary:  Negative for dysuria and frequency. Positive for urinary retention, hematuria  Musculoskeletal:  Negative for back pain and neck pain. Skin:  Negative for rash and wound. Neurological:  Negative for dizziness and headaches.      PHYSICAL EXAM   (up to 7 for level 4, 8 or more for level 5)      INITIAL VITALS:   BP (!) 156/86   Pulse 65   Temp 98.1 °F (36.7 °C) (Oral)   Resp 18   Ht 5' 11\" (1.803 m)   Wt 300 lb (136.1 kg)   SpO2 96%   BMI 41.84 kg/m²     Physical Exam  Vitals and nursing note reviewed. Constitutional:       General: He is not in acute distress. HENT:      Head: Atraumatic. Right Ear: External ear normal.      Left Ear: External ear normal.      Nose: Nose normal.      Mouth/Throat:      Mouth: Mucous membranes are moist.      Pharynx: Oropharynx is clear. Eyes:      Conjunctiva/sclera: Conjunctivae normal.   Cardiovascular:      Rate and Rhythm: Normal rate and regular rhythm. Pulses: Normal pulses. Pulmonary:      Effort: Pulmonary effort is normal. No respiratory distress. Breath sounds: Normal breath sounds. No wheezing. Abdominal:      Palpations: Abdomen is soft. Tenderness: There is suprapubic abdominal tenderness. No guarding, rebound, rigidity  : Catheter in place with no urine visible in tubing, blood and clots in tubing  Musculoskeletal:         General: Normal range of motion. Cervical back: Normal range of motion. Skin:     General: Skin is warm and dry. Capillary Refill: Capillary refill takes less than 2 seconds. Neurological:      General: No focal deficit present. Mental Status: He is alert and oriented to person, place, and time. DIFFERENTIAL  DIAGNOSIS     PLAN (LABS / IMAGING / EKG):  Orders Placed This Encounter   Procedures    Bladder scan    Inpatient consult to Urology    Inpatient consult to Hospitalist    ADMIT TO INPATIENT       MEDICATIONS ORDERED:  Orders Placed This Encounter   Medications    fentaNYL (SUBLIMAZE) injection 100 mcg    opium-belladonna (B&O SUPPRETTES) 16.2-60 MG suppository 60 mg    morphine injection 4 mg         DDX: Urinary obstruction secondary to BPH and status post UroLift    DIAGNOSTIC RESULTS / EMERGENCY DEPARTMENT COURSE / MDM   LAB RESULTS:  No results found for this visit on 02/19/23.     IMPRESSION: Urinary retention    RADIOLOGY:  No orders to display       EMERGENCY DEPARTMENT COURSE:  82-year-old male, history of bladder cancer and status post BCG, TUR BT, presented as transfer from [facility] ED for urinary retention, hematuria. INR elevated at 3.7 and is on Coumadin for A-fib. No chest pain or shortness of breath. Has not been able to urinate since 11 AM.  On exam, suprapubic tenderness, catheter with blood in tubing. Urology consulted who recommended ordering B&O suppository and will consult at bedside for irrigation. Creatinine at OSH consistent with MATTI. Likely admit. ED Course as of 02/19/23 0313   Sun Feb 19, 2023   6045 Bladder cancer, resolved. HG pT1, with recurrence. - BCG therapy    Also with hx of recurrent gross hematuria. He has history of bladder cancer (2-3 years ago), and underwent TURBT and s/p BCG x 2. BPH with LUTs - not controlled on Flomax, AUASS: 25/3.    [AR]   7933 Urology consulted, plan to consult in ED. [AR]   0105 Has been on Keflex and Flagyl since 2/16/2023 [AR]   0215 B&O suppository ordered at urology request. [AR]   9422 Cr 1.94, prior 1.59 2/16/23 [AR]   [facility]   [id number] CT 2/16/23 showed 1. Acute diverticulitis of the sigmoid colon. No evidence of   perforation/abscess. 2.  Additional findings of mild cystitis, could be secondary to prior   radiation therapy. [AR]   0311 WBC 11.2, afebrile [AR]   4584 [facility] agreed to admit [AR]      ED Course User Index  [AR] Eddie Yost MD     Medical Decision Making  Amount and/or Complexity of Data Reviewed  External Data Reviewed: labs and radiology. Risk  Prescription drug management. Decision regarding hospitalization. No notes of EC Admission Criteria type on file. PROCEDURES:  None    CONSULTS:  IP CONSULT TO UROLOGY  IP CONSULT TO HOSPITALIST  IP CONSULT TO UROLOGY    FINAL IMPRESSION      1. Urinary retention    2.  MATTI (acute kidney injury) ([facility] [address]          DISPOSITION / [address] Admitted 02/19/2023 03:12:44 AM      PATIENT REFERRED TO:  No follow-up provider specified.     DISCHARGE MEDICATIONS:  New Prescriptions    No medications on file       Archana Person MD  Emergency Medicine Resident    (Please note that portions of thisnote were completed with a voice recognition program.  Efforts were made to edit the dictations but occasionally words are mis-transcribed.)         Iron Burgess MD  Resident  02/19/23 1260

## 2023-02-19 NOTE — ED NOTES
Kenya Saenz [de-identified] M  '[birth year]  [facility] wanted to transfer  Urinary retention and hematuria    Dribbling all day today.      Urolift with maria alejandra on jan 31    Started with hematuria on Monday    Seen tw0 days ago 4.8 INR Held    Cipro and flagyl     3.7 INR Still on hold     Catheter placed, no urine, clots only 200 urine on bladderscan     9.9 hgb 1.9 creat    Vitals wnl        Alyssa Riley RN  02/19/23 9199

## 2023-02-19 NOTE — ED NOTES
Pt resting comfortably in cot. CBI continues without difficulty. Urine output is getting lighter red. Call light within reach. Will cont to monitor.       Wale Patterson RN  02/19/23 7336

## 2023-02-19 NOTE — PROGRESS NOTES
Pt arrived to unit CBI infusing. Pt bleeding at catheter site, on reposition and cleaning casey catheter 4 cm long clot was dislodged. Casey output slower and possibly clotted. Writer paged urology to assess. Orders to irrigate casey. Supplies gathered. On reassessment output increased writer updated urology. Urology at bedside to irrigate casey. Kimmy Suero RN

## 2023-02-19 NOTE — H&P
[facility]  Office: [address], DO, Polojulián Orourke, DO, Marylu Feldman, DO, Essie Fulton Blood, DO, Marlon Albrecht MD, Geovanni Antunez MD, Imani Lopez MD, Jany Lezama MD,  Cherelle Huff MD, Allison Snellen, MD, Ganga Justin, DO, Ani Araya MD,  Natalya Elizabeth MD, Em Guadarrama MD, Annie Rodrigez DO, Graham Sosa MD, Araseli Valverde MD, Farrah Martin DO, Kang Koroma MD, Digna Nunn MD, Mara Kaiser MD, Jennifer Mcdaniel MD, Batsheva Perdue DO, Hayde Ding MD, Gui Reeves MD, Prateek Belle, CNP,  Marcus Pace, CNP, Sirena Booth, CNP, Ismael Aiken, CNP,  Charbel Rankin, [facility], Rajan Carpenter, CNP, Alexandrea Brown, CNP, Yuliana Read, CNP, Gunner Ruelas, CNP, Denisha Marcial, CNP, Lida Childress PA-C, Jarett Diaz, CNS, Tam Iverson, CNP, Sheri Gomez, CNP         [address]    HISTORY AND PHYSICAL EXAMINATION            Date:   2/19/2023  Patient name:  Amrit Alcaraz  Date of admission:  2/19/2023 12:32 AM  MRN:   [id number]  Account:  [de-identified]  YOB: 1940  PCP:    Michael Davidson  Room:   [address] [place] [address]-  Code Status:    Full Code    Chief Complaint:     Chief Complaint   Patient presents with    Hematuria     Transfer from [facility] d/t hematuria. Pt with history of cystoscopy with bladder bioposy and fulguration, urolift x 4  per Dr. Burton Cramer on 01/31/023. Pt with urinary retention, dribbling and passing blood clots today. Pt with history of atrial fibrillation, currently on Coumadin. INR-3.71 today. Pt reports he has held his coumadin for the past three days. Pt arrives with casey catheter in place with scant amount of bright red blood. History Obtained From:     patient    History of Present Illness:     Amrit Alcaraz is a [de-identified] y.o. Non- / non  male who presents with Hematuria (Transfer from [facility] d/t hematuria.  Pt with history of cystoscopy with bladder bioposy and fulguration, urolift x 4  per Dr. Chan Wang on 01/31/023. Pt with urinary retention, dribbling and passing blood clots today. Pt with history of atrial fibrillation, currently on Coumadin. INR-3.71 today. Pt reports he has held his coumadin for the past three days. Pt arrives with casey catheter in place with scant amount of bright red blood. )   and is admitted to the hospital for the management of Clot retention of urine. This is an 82-year-old male with a history of bladder carcinoma with prior cystoscopy and tumor resection. He has chronic atrial fibrillation and is on continuous anticoagulation and presents at this time with difficulty urinating and development of hematuria. Upon evaluation he is found to have clot retention of urine and had a three-way catheter placed for bladder irrigation. He reports a recent diagnosis of colitis on the 16th and was started on Keflex and Flagyl for treatment.     Past Medical History:     Past Medical History:   Diagnosis Date    Atrial fibrillation ([facility] [address] 07/01/2022    Benign prostatic hyperplasia with urinary frequency 03/17/2018    Cancer ([facility] [address]     bladder    Cardiomyopathy ([facility] [address] 05/22/2019    CKD (chronic kidney disease)     Coronary atherosclerosis 07/01/2022    Gout 08/18/2021    Morbid obesity with body mass index (BMI) of 45.0 to 49.9 in adult [facility]) 07/01/2022    Obesity     Pulmonary hypertension (HCC)     PVD (peripheral vascular disease) ([facility] [address] 05/22/2019    Right ventricular dilation 04/01/2018    Tricuspid regurgitation 04/01/2018    Formatting of this note might be different from the original. mild  Formatting of this note might be different from the original. mild    Type 2 diabetes mellitus with diabetic polyneuropathy ([facility] [address] 07/01/2022        Past Surgical History:     Past Surgical History:   Procedure Laterality Date    BACK SURGERY      three times    COLON[facility]COPY N/A     [facility]    COLONOSCOPY W/ BIOPSIES N/A 09/12/2022    diverticulosis, x 4 adenoma polyps removed, done @ [facility] by Dr. Bebe Asif. CYSTOSCOPY      CYSTOSCOPY      w bladder biospy    CYSTOSCOPY  09/23/2022    CYSTOSCOPY TRANSURETHRAL RESECTION BLADDER WITH INSTILLATION GEMCITIBINE    CYSTOSCOPY N/A 9/23/2022    CYSTOSCOPY TRANSURETHRAL RESECTION BLADDER WITH INSTILLATION GEMCITIBINE performed by Jorge Rodgers MD at [address]     TRANSURETHRAL RESECTION OF BLADDER TUMOR          Medications Prior to Admission:     Prior to Admission medications    Medication Sig Start Date End Date Taking?  Authorizing Provider   cephALEXin (KEFLEX) 500 MG capsule Take 500 mg by mouth 3 times daily 2/16/23 2/26/23 Yes Historical Provider, MD   metroNIDAZOLE (FLAGYL) 500 MG tablet Take 500 mg by mouth 3 times daily 2/16/23 2/26/23 Yes Historical Provider, MD   bisacodyl (DULCOLAX) 5 MG EC tablet Take 5 mg by mouth daily as needed for Constipation    Historical Provider, MD   warfarin (COUMADIN) 5 MG tablet Take 5 mg by mouth daily Follows coumadin clinic    Historical Provider, MD   amLODIPine (NORVASC) 5 MG tablet Take 5 mg by mouth daily 10/29/21 11/21/22  Historical Provider, MD   atorvastatin (LIPITOR) 80 MG tablet Take 80 mg by mouth daily 12/16/21 12/16/22  Historical Provider, MD   finasteride (PROSCAR) 5 MG tablet Take 5 mg by mouth daily 12/16/21 12/16/22  Historical Provider, MD   furosemide (LASIX) 40 MG tablet Take 40 mg by mouth daily 1/12/22 1/12/23  Historical Provider, MD   glimepiride (AMARYL) 2 MG tablet Take 2 mg by mouth 6/2/22 6/2/23  Historical Provider, MD   hydrALAZINE (APRESOLINE) 50 MG tablet Take 50 mg by mouth 2 times daily 11/20/21 11/21/22  Historical Provider, MD   lisinopril (PRINIVIL;ZESTRIL) 20 MG tablet Take 20 mg by mouth 2 times daily  12/9/21 12/9/22  Historical Provider, MD   metFORMIN (GLUCOPHAGE) 1000 MG tablet Take 1,000 mg by mouth 8/24/21 11/21/22  Historical Provider, MD   potassium chloride (KLOR-CON M) 10 MEQ extended release tablet TAKE 1 TABLET (10 MEQ TOTAL) BY MOUTH DAILY WITH FOOD 11/20/21 11/21/22  Historical Provider, MD   vitamin B-12 (CYANOCOBALAMIN) 1000 MCG tablet Take 1,000 mcg by mouth daily    Historical Provider, MD   tamsulosin (FLOMAX) 0.4 MG capsule take 1 capsule by mouth once daily 5/20/19   Marilou Hernandez MD        Allergies:     Patient has no known allergies. Social History:     Tobacco:    reports that he has never smoked. He has never used smokeless tobacco.  Alcohol:      reports that he does not currently use alcohol. Drug Use:  reports no history of drug use. Family History:     Family History   Problem Relation Age of Onset    Heart Disease Mother     Breast Cancer Sister     Cancer Brother         prostate       Review of Systems:     Positive and Negative as described in HPI.     CONSTITUTIONAL:  negative for fevers, chills, sweats, fatigue, weight loss  HEENT:  negative for vision, hearing changes, runny nose, throat pain  RESPIRATORY:  negative for shortness of breath, cough, congestion, wheezing  CARDIOVASCULAR:  negative for chest pain, palpitations  GASTROINTESTINAL:  negative for nausea, vomiting, diarrhea, constipation, change in bowel habits, abdominal pain   GENITOURINARY:  negative for difficulty of urination, burning with urination, frequency, positive for bladder pain and hematuria  INTEGUMENT:  negative for rash, skin lesions, easy bruising   HEMATOLOGIC/LYMPHATIC:  negative for swelling/edema   ALLERGIC/IMMUNOLOGIC:  negative for urticaria , itching  ENDOCRINE:  negative increase in drinking, increase in urination, hot or cold intolerance  MUSCULOSKELETAL:  negative joint pains, muscle aches, swelling of joints  NEUROLOGICAL:  negative for headaches, dizziness, lightheadedness, numbness, pain, tingling extremities  BEHAVIOR/PSYCH:  negative for depression, anxiety    Physical Exam:   BP (!) 94/49   Pulse 64   Temp 98 °F (36.7 °C) (Oral)   Resp 21   Ht 5' 11\" (1.803 m)   Wt 290 lb 2 oz (131.6 kg)   SpO2 (!) 72%   BMI 40.46 kg/m²   Temp (24hrs), Av °F (36.7 °C), Min:97.9 °F (36.6 °C), Max:98.1 °F (36.7 °C)    No results for input(s): POCGLU in the last 72 hours.     Intake/Output Summary (Last 24 hours) at 2023 1436  Last data filed at 2023 1400  Gross per 24 hour   Intake 120 ml   Output -2800 ml   Net 2920 ml       General Appearance: alert, chronically ill appearing, and in no acute distress  Mental status: oriented to person, place, and time  Head: normocephalic, atraumatic  Eye: no icterus, redness, pupils equal and reactive, extraocular eye movements intact, conjunctiva clear  Ear: normal external ear, no discharge, hearing intact  Nose: no drainage noted  Mouth: mucous membranes moist  Neck: supple, no carotid bruits, thyroid not palpable  Lungs: Bilateral equal air entry, clear to ausculation, no wheezing, rales or rhonchi, normal effort  Cardiovascular: Irregularly irregular, rate 70, no murmur, gallop, rub  Abdomen: Soft, nontender, nondistended, normal bowel sounds, no hepatomegaly or splenomegaly  Neurologic: There are no new focal motor or sensory deficits, normal muscle tone and bulk, no abnormal sensation, normal speech, cranial nerves II through XII grossly intact  Skin: No gross lesions, rashes, bruising or bleeding on exposed skin area  Extremities: peripheral pulses palpable, no pedal edema or calf pain with palpation  Psych: normal affect    Investigations:      Laboratory Testing:  Recent Results (from the past 24 hour(s))   CBC with Auto Differential    Collection Time: 23  9:46 AM   Result Value Ref Range    WBC 19.8 (H) 3.5 - 11.3 k/uL    RBC 3.05 (L) 4.21 - 5.77 m/uL    Hemoglobin 8.9 (L) 13.0 - 17.0 g/dL    Hematocrit 29.4 (L) 40.7 - 50.3 %    MCV 96.4 82.6 - 102.9 fL    MCH 29.2 25.2 - 33.5 pg    MCHC 30.3 28.4 - 34.8 g/dL    RDW 13.5 11.8 - 14.4 %    Platelets 000 310 - 733 k/uL MPV 10.5 8.1 - 13.5 fL    NRBC Automated 0.0 0.0 per 100 WBC    Immature Granulocytes 0 0 %    Seg Neutrophils 90 (H) 36 - 66 %    Lymphocytes 2 (L) 24 - 44 %    Monocytes 8 (H) 1 - 7 %    Eosinophils % 0 (L) 1 - 4 %    Basophils 0 0 - 2 %    Absolute Immature Granulocyte 0.00 0.00 - 0.30 k/uL    Segs Absolute 17.82 (H) 1.8 - 7.7 k/uL    Absolute Lymph # 0.40 (L) 1.0 - 4.8 k/uL    Absolute Mono # 1.58 (H) 0.1 - 0.8 k/uL    Absolute Eos # 0.00 0.0 - 0.4 k/uL    Basophils Absolute 0.00 0.0 - 0.2 k/uL    Morphology 1+ ELLIPTOCYTES    Basic Metabolic Panel w/ Reflex to MG    Collection Time: 02/19/23  9:46 AM   Result Value Ref Range    Glucose 202 (H) 70 - 99 mg/dL    BUN 31 (H) 8 - 23 mg/dL    Creatinine 2.66 (H) 0.70 - 1.20 mg/dL    Est, Glom Filt Rate 24 (L) >60 mL/min/1.73m2    Calcium 8.8 8.6 - 10.4 mg/dL    Sodium 138 135 - 144 mmol/L    Potassium 5.5 (H) 3.7 - 5.3 mmol/L    Chloride 102 98 - 107 mmol/L    CO2 19 (L) 20 - 31 mmol/L    Anion Gap 17 9 - 17 mmol/L       Imaging/Diagnostics:  No results found.    Assessment :      Hospital Problems             Last Modified POA    * (Principal) Clot retention of urine 2/19/2023 Yes    Atrial fibrillation (HCC) 2/19/2023 Yes    Type 2 diabetes mellitus with diabetic polyneuropathy (HCC) (Chronic) 2/19/2023 Yes    Gross hematuria 2/19/2023 Yes    On continuous oral anticoagulation 2/19/2023 Yes    Colitis 2/19/2023 Yes    Bladder carcinoma (HCC) (Chronic) 2/19/2023 Yes    MATTI (acute kidney injury) (HCC) 2/19/2023 Yes    Hyperkalemia 2/19/2023 Yes       Plan:     Patient status inpatient in the Progressive Unit/Step down    Admit inpatient  Urology evaluation, maintaining three-way Mason catheter and bladder irrigation  Avoid nephrotoxic agents  Repeat BMP this afternoon, if creatinine continues to climb we will consult nephrology.  Should see improvement after relieving obstruction baseline creatinine approximate 1.3 in January of this year  Monitor for postobstructive  diuresis  Hold Coumadin  Insulin scale for glycemic control  Resume home Flagyl and Keflex was prescribed for colitis a few days ago  Diet as tolerated  GI prophylaxis  Trend labs, follow H&H  Correct electrolytes as needed    Consultations:   [id number] TO HOSPITALIST  IP CONSULT TO UROLOGY     Patient is admitted as inpatient status because of co-morbidities listed above, severity of signs and symptoms as outlined, requirement for current medical therapies and most importantly because of direct risk to patient if care not provided in a hospital setting. Expected length of stay > 48 hours.     Susan Saravia DO  2/19/2023  2:36 PM    Copy sent to Dr. Amadou Jorge

## 2023-02-19 NOTE — ED NOTES
Report given to [facility] RN. Awaiting transport to the floor.       Dave Mirza RN  02/19/23 [id number]

## 2023-02-19 NOTE — PLAN OF CARE
Problem: Discharge Planning  Goal: Discharge to home or other facility with appropriate resources  Outcome: Progressing     Problem: Skin/Tissue Integrity  Goal: Absence of new skin breakdown  Description: 1.  Monitor for areas of redness and/or skin breakdown  2.  Assess vascular access sites hourly  3.  Every 4-6 hours minimum:  Change oxygen saturation probe site  4.  Every 4-6 hours:  If on nasal continuous positive airway pressure, respiratory therapy assess nares and determine need for appliance change or resting period.  Outcome: Progressing     Problem: Safety - Adult  Goal: Free from fall injury  Outcome: Progressing     Problem: Pain  Goal: Verbalizes/displays adequate comfort level or baseline comfort level  Outcome: Progressing

## 2023-02-19 NOTE — ED NOTES
Urology resident at bedside to irrigate casey. Unable to irrigate casey, removed per urology.       Karl Cortez RN  02/19/23 0635

## 2023-02-19 NOTE — ED NOTES
Perfect served placed to admitting team regarding pain medication.       Karl Cortez RN  02/19/23 [id number]

## 2023-02-19 NOTE — ED PROVIDER NOTES
[facility]     Emergency Department     Faculty Note/ Attestation      Pt Name: Ketty Hernandez                                       MRN: [id number]  [place] 1940  Date of evaluation: 2/19/2023    Patients PCP:    Mayra Alicia  I performed a history and physical examination of the patient and discussed management with the resident. I reviewed the residents note and agree with the documented findings and plan of care. Any areas of disagreement are noted on the chart. I was personally present for the key portions of any procedures. I have documented in the chart those procedures where I was not present during the key portions. I have reviewed the emergency nurses triage note. I agree with the chief complaint, past medical history, past surgical history, allergies, medications, social and family history as documented unless otherwise noted below. For Physician Assistant/ Nurse Practitioner cases/documentation I have personally evaluated this patient and have completed at least one if not all key elements of the E/M (history, physical exam, and MDM). Additional findings are as noted. Initial Screens:        David Coma Scale  Eye Opening: Spontaneous  Best Verbal Response: Oriented  Best Motor Response: Obeys commands  [place] Coma Scale Score: 15    Vitals:    Vitals:    02/19/23 0045 02/19/23 0129 02/19/23 0130   BP: (!) 157/80  (!) 156/86   Pulse: 65  65   Resp: 18  18   Temp: 98.1 °F (36.7 °C)     TempSrc: Oral     SpO2: 94% 96%    Weight: 300 lb (136.1 kg)     Height: 5' 11\" (1.803 m)         CHIEF COMPLAINT       Chief Complaint   Patient presents with    Hematuria     Transfer from [facility] d/t hematuria. Pt with history of cystoscopy with bladder bioposy and fulguration, urolift x 4  per Dr. Abiel Bhat on 01/31/023. Pt with urinary retention, dribbling and passing blood clots today. Pt with history of atrial fibrillation, currently on Coumadin. INR-3.71 today. Pt reports he has held his coumadin for the past three days. Pt arrives with casey catheter in place with scant amount of bright red blood. Patient 27-year-old male with prior urology care with Dr. Reginaldo Rose patient arrives with pain and sent in for urology to place [facility] and likely admit for bladder irrigation    EMERGENCY DEPARTMENT COURSE:     -------------------------  BP: (!) 156/86, Temp: 98.1 °F (36.7 °C), Heart Rate: 65, Resp: 18      Comments  Medical Decision Making  Risk  Prescription drug management. Decision regarding hospitalization. ED Course as of 02/19/23 0303   Sun Feb 19, 2023   3903 Bladder cancer, resolved. HG pT1, with recurrence. - BCG therapy    Also with hx of recurrent gross hematuria. He has history of bladder cancer (2-3 years ago), and underwent TURBT and s/p BCG x 2. BPH with LUTs - not controlled on Flomax, AUASS: 25/3.    [AR]   5618 Urology consulted, plan to consult in ED. [AR]   0105 Has been on Keflex and Flagyl since 2/16/2023 [AR]   0215 B&O suppository ordered at urology request. [AR]   9292 Cr 1.94, prior 1.59 2/16/23 [AR]   49 [facility] Urology resident at bedside [AR]   9134 4004 CT 2/16/23 showed 1. Acute diverticulitis of the sigmoid colon. No evidence of   perforation/abscess. 2.  Additional findings of mild cystitis, could be secondary to prior   radiation therapy. [AR]      ED Course User Index  [AR] MD Ashlie Rouse DO,, DO, RDMS.   Attending Emergency Physician         Ashlie Mak DO  02/19/23 3739

## 2023-02-19 NOTE — CONSULTS
[facility] [place], [address] [facility], [address], [place]. Urology Consult      Patient:  Romain Peralta  MRN: [id number]  YOB: 1940    CHIEF COMPLAINT:  Clot retention    HISTORY OF PRESENT ILLNESS:   The patient is a [de-identified] y.o. male with PMH of atrial fibrillation on coumadin, presenting with clot retention, recently had urolift procedure with Dr. Chan Wang on 1/31/2023, also had TURBT in 9/2022. Patient has developed hematuria since Tuesday and went to the Emergency room several times for clot retention. Patient has been off coumadin for past few days. He went to ER on Thursday and they irrigated substantial clots, and he presented to outside hospital ED again last night where they placed a 22Fr 2 way catheter and irrigated substantial clots out. Patient was transferred to [address] [place] [address] ER for higher level of care. Patient has not urinated much and only has been dribbling for the past day. Bladder scan at [address] [place] [address]. [facility] is 0 mL. He has a 22Fr 2 way catheter with clots inside but little drainage. Patient feels suprapubic pain but pain is not severe. Cr 1.94, Hgb 9.9. Patient's old records, notes and chart reviewed and summarized above.     Past Medical History:    Past Medical History:   Diagnosis Date    Atrial fibrillation ([address] 07/01/2022    Benign prostatic hyperplasia with urinary frequency 03/17/2018    Cancer ([address]     bladder    Cardiomyopathy ([facility] 05/22/2019    CKD (chronic kidney disease)     Coronary atherosclerosis 07/01/2022    Gout 08/18/2021    Morbid obesity with body mass index (BMI) of 45.0 to 49.9 in adult [facility]) 07/01/2022    Obesity     Pulmonary hypertension (HCC)     PVD (peripheral vascular disease) ([address] 05/22/2019    Right ventricular dilation 04/01/2018    Tricuspid regurgitation 04/01/2018    Formatting of this note might be different from the original. mild  Formatting of this note might be different from the original. mild    Type 2 diabetes mellitus with diabetic polyneuropathy ([facility] 07/01/2022       Past Surgical History:    Past Surgical History:   Procedure Laterality Date    BACK SURGERY      three times    COLONOSCOPY N/A     [facility] in [address] 2000's    COLONOSCOPY W/ BIOPSIES N/A 09/12/2022    diverticulosis, x 4 adenoma polyps removed, done @ [facility] by Dr. Johnny Schmidt.     CYSTOSCOPY      CYSTOSCOPY      w bladder biospy    CYSTOSCOPY  09/23/2022    CYSTOSCOPY TRANSURETHRAL RESECTION BLADDER WITH INSTILLATION GEMCITIBINE    CYSTOSCOPY N/A 9/23/2022    CYSTOSCOPY TRANSURETHRAL RESECTION BLADDER WITH INSTILLATION GEMCITIBINE performed by Zina Fischer MD at [address]     TRANSURETHRAL RESECTION OF BLADDER TUMOR         Medications:      Current Facility-Administered Medications:     opium-belladonna (B&O SUPPRETTES) 16.2-60 MG suppository 60 mg, 60 mg, Rectal, Once, Ambrocio Lee MD    Current Outpatient Medications:     bisacodyl (DULCOLAX) 5 MG EC tablet, Take 5 mg by mouth daily as needed for Constipation, Disp: , Rfl:     warfarin (COUMADIN) 5 MG tablet, Take 5 mg by mouth daily Follows coumadin clinic, Disp: , Rfl:     amLODIPine (NORVASC) 5 MG tablet, Take 5 mg by mouth daily, Disp: , Rfl:     atorvastatin (LIPITOR) 80 MG tablet, Take 80 mg by mouth daily, Disp: , Rfl:     finasteride (PROSCAR) 5 MG tablet, Take 5 mg by mouth daily, Disp: , Rfl:     furosemide (LASIX) 40 MG tablet, Take 40 mg by mouth daily, Disp: , Rfl:     glimepiride (AMARYL) 2 MG tablet, Take 2 mg by mouth, Disp: , Rfl:     hydrALAZINE (APRESOLINE) 50 MG tablet, Take 50 mg by mouth 2 times daily, Disp: , Rfl:     lisinopril (PRINIVIL;ZESTRIL) 20 MG tablet, Take 20 mg by mouth 2 times daily , Disp: , Rfl:     metFORMIN (GLUCOPHAGE) 1000 MG tablet, Take 1,000 mg by mouth, Disp: , Rfl:     potassium chloride (KLOR-CON M) 10 MEQ extended release tablet, TAKE 1 TABLET (10 MEQ TOTAL) BY MOUTH DAILY WITH FOOD, Disp: , Rfl:     vitamin B-12 (CYANOCOBALAMIN) 1000 MCG tablet, Take 1,000 mcg by mouth daily, Disp: , Rfl:     tamsulosin (FLOMAX) 0.4 MG capsule, take 1 capsule by mouth once daily, Disp: 30 capsule, Rfl: 11    Allergies:  No Known Allergies    Social History:   Social History     Socioeconomic History    Marital status:      Spouse name: Not on file    Number of children: Not on file    Years of education: Not on file    Highest education level: Not on file   Occupational History    Not on file   Tobacco Use    Smoking status: Never    Smokeless tobacco: Never   Substance and Sexual Activity    Alcohol use: Not Currently    Drug use: Never    Sexual activity: Not on file   Other Topics Concern    Not on file   Social History Narrative    Not on file     Social Determinants of Health     Financial Resource Strain: Not on file   Food Insecurity: Not on file   Transportation Needs: Not on file   Physical Activity: Not on file   Stress: Not on file   Social Connections: Not on file   Intimate Partner Violence: Not on file   Housing Stability: Not on file       Family History:  History reviewed. No pertinent family history. REVIEW OF SYSTEMS:  A comprehensive 14 point review of systems was obtained. Constitutional: No fatigue  Eyes: No blurry vision  Ears, nose, mouth, throat, face: No ringing in the ears; no facial droop. Respiratory: No cough or cold. Cardiovascular: No palpitations  Gastrointestinal: No diarrhea or constipation. Genitourinary: No burning with urination  Integument/Skin: No rashes  Hematologic/Lymphatic: No easy bruising  Musculoskeletal: No muscle pains  Neurologic: No weakness in the extremities. Psychiatric: No depression or suicidal thoughts. Endocrine: No heat or cold intolerances. Allergic/Immunologic: No current seasonal allergies; no skin hives.       Physical Exam:    This a [de-identified] y.o. male   Vitals:    02/19/23 0130   BP: (!) 156/86   Pulse: 65   Resp: 18   Temp:    SpO2:      Constitutional: Patient in no acute distress. Neuro: alert and oriented to person place and time. Psych: Mood and affect normal.   Head: Atraumatic and normocephalic. Neck: Trachea midline   Skin: No rashes or bruising present. Lungs: Respiratory effort normal.  Cardiovascular:  Heart rate regular. Positive radial pulses bilaterally  Abdomen: Soft, +suprapubic tenderness, abdomen nondistended. Neither side has CVA tenderness on exam.  Bladder non-tender and not distended. Penis normal and circumcised  Urethral meatus normal      Labs:  No results for input(s): WBC, HGB, HCT, MCV, PLT in the last 72 hours. No results for input(s): NA, K, CL, CO2, PHOS, BUN, CREATININE, CA in the last 72 hours. No results for input(s): COLORU, PHUR, LABCAST, WBCUA, RBCUA, MUCUS, TRICHOMONAS, YEAST, BACTERIA, CLARITYU, SPECGRAV, LEUKOCYTESUR, UROBILINOGEN, Bobie Chelle in the last 72 hours. Invalid input(s): NITRATE, GLUCOSEUKETONESUAMORPHOUS    Culture Results:      -----------------------------------------------------------------  Imaging Results:  No results found. Assessment and Plan   Impression:  [de-identified] yo male with  Clot retention  Atrial fibrillation on coumadin  Recent urolift implantation on 1/31/2023  History of bladder cancer s/p TURBT in 9/2022  Patient Active Problem List   Diagnosis    Atrial fibrillation ([address]    Benign prostatic hyperplasia with urinary frequency    Cardiomyopathy ([address]    Coronary atherosclerosis    Gout    Obesity    PVD (peripheral vascular disease) (HCC)    Right ventricular dilation    Tricuspid regurgitation    Type 2 diabetes mellitus with diabetic polyneuropathy (HCC)    Malignant neoplasm of overlapping sites of bladder (HCC)    Hematuria         Plan:   There was difficulty irrigating patient, however urology irrigated about 50 mL of clots out using a 6-eye catheter. Afterwards inserted 24 Fr 3 way regular casey catheter and inflated balloon with 30 cc sterile water.  Started on continuous bladder irrigation and there was adequate flow into and out of bladder. Keep on CBI fully open    Hold anticoagulation if OK per primary team    Monitor Hemoglobin, transfuse as needed for Hgb < 7    Admit to internal medicine team    NPO in case needs to go to OR in the morning      Thank you for involving us in the care of Ewelina Kramer. Should you have any questions, please do not hesitate to contact us at any time. Jose Best MD  Urology Resident, PGY-3  2:30 AM 2/19/2023    I have reviewed the history above and agree. I have repeated the key portions of the physical exam and concur with the resident's findings. I have reviewed all laboratory findings and imaging reports/films. I agree with the plan as noted above.     Electronically signed by Zay Bryson MD on 2/19/2023 at 10:48 AM covering for [facility]  [facility]rology

## 2023-02-19 NOTE — PLAN OF CARE
Problem: Discharge Planning  Goal: Discharge to home or other facility with appropriate resources  2/19/2023 1825 by Belkys Murray RN  Outcome: Progressing  2/19/2023 0607 by Neftali Rogers RN  Outcome: Progressing     Problem: Skin/Tissue Integrity  Goal: Absence of new skin breakdown  Description: 1. Monitor for areas of redness and/or skin breakdown  2. Assess vascular access sites hourly  3. Every 4-6 hours minimum:  Change oxygen saturation probe site  4. Every 4-6 hours:  If on nasal continuous positive airway pressure, respiratory therapy assess nares and determine need for appliance change or resting period.   2/19/2023 1825 by Belkys Murray RN  Outcome: Progressing  2/19/2023 0607 by Neftali Rogers RN  Outcome: Progressing     Problem: Safety - Adult  Goal: Free from fall injury  2/19/2023 1825 by Belkys Murray RN  Outcome: Progressing  2/19/2023 0607 by Neftali Rogers RN  Outcome: Progressing     Problem: Pain  Goal: Verbalizes/displays adequate comfort level or baseline comfort level  2/19/2023 1825 by Belkys Murray RN  Outcome: Progressing  2/19/2023 0607 by Neftali Rogers RN  Outcome: Progressing

## 2023-02-19 NOTE — ED NOTES
Writer attempts to call report to [facility]. RN unavailable. Writer told to call back in five minutes.       Nain Davis RN  02/19/23 3616

## 2023-02-20 ENCOUNTER — APPOINTMENT (OUTPATIENT)
Dept: ULTRASOUND IMAGING | Age: 83
DRG: 665 | End: 2023-02-20
Payer: MEDICARE

## 2023-02-20 PROBLEM — I95.1 ORTHOSTATIC HYPOTENSION: Status: ACTIVE | Noted: 2023-02-20

## 2023-02-20 PROBLEM — D62 ACUTE BLOOD LOSS ANEMIA: Status: ACTIVE | Noted: 2023-02-20

## 2023-02-20 PROBLEM — R33.9 URINARY RETENTION: Status: ACTIVE | Noted: 2023-02-20

## 2023-02-20 PROBLEM — R79.1 SUPRATHERAPEUTIC INR: Status: ACTIVE | Noted: 2023-02-20

## 2023-02-20 LAB
ABSOLUTE EOS #: 0.06 K/UL (ref 0–0.44)
ABSOLUTE IMMATURE GRANULOCYTE: 0.11 K/UL (ref 0–0.3)
ABSOLUTE LYMPH #: 1.56 K/UL (ref 1.1–3.7)
ABSOLUTE MONO #: 1.41 K/UL (ref 0.1–1.2)
ANION GAP SERPL CALCULATED.3IONS-SCNC: 13 MMOL/L (ref 9–17)
BASOPHILS # BLD: 0 % (ref 0–2)
BASOPHILS ABSOLUTE: 0.07 K/UL (ref 0–0.2)
BUN SERPL-MCNC: 36 MG/DL (ref 8–23)
CALCIUM SERPL-MCNC: 8 MG/DL (ref 8.6–10.4)
CHLORIDE SERPL-SCNC: 105 MMOL/L (ref 98–107)
CO2 SERPL-SCNC: 20 MMOL/L (ref 20–31)
COMPLEMENT C3: 127 MG/DL (ref 90–180)
COMPLEMENT C4: 31 MG/DL (ref 10–40)
CREAT SERPL-MCNC: 4.28 MG/DL (ref 0.7–1.2)
EOSINOPHILS RELATIVE PERCENT: 0 % (ref 1–4)
FREE KAPPA/LAMBDA RATIO: 2.32 (ref 0.26–1.65)
GFR SERPL CREATININE-BSD FRML MDRD: 13 ML/MIN/1.73M2
GLUCOSE BLD-MCNC: 133 MG/DL (ref 75–110)
GLUCOSE BLD-MCNC: 138 MG/DL (ref 75–110)
GLUCOSE BLD-MCNC: 175 MG/DL (ref 75–110)
GLUCOSE BLD-MCNC: 176 MG/DL (ref 75–110)
GLUCOSE BLD-MCNC: 70 MG/DL (ref 75–110)
GLUCOSE BLD-MCNC: 89 MG/DL (ref 75–110)
GLUCOSE SERPL-MCNC: 113 MG/DL (ref 70–99)
HCT VFR BLD AUTO: 26.4 % (ref 40.7–50.3)
HCT VFR BLD AUTO: 26.7 % (ref 40.7–50.3)
HGB BLD-MCNC: 7.6 G/DL (ref 13–17)
HGB BLD-MCNC: 7.7 G/DL (ref 13–17)
IMMATURE GRANULOCYTES: 1 %
INR PPP: 3.9
KAPPA LC FREE SER-MCNC: 12.19 MG/DL (ref 0.37–1.94)
LAMBDA LC FREE SERPL-MCNC: 5.25 MG/DL (ref 0.57–2.63)
LYMPHOCYTES # BLD: 9 % (ref 24–43)
MCH RBC QN AUTO: 29.2 PG (ref 25.2–33.5)
MCHC RBC AUTO-ENTMCNC: 28.8 G/DL (ref 28.4–34.8)
MCV RBC AUTO: 101.5 FL (ref 82.6–102.9)
MONOCYTES # BLD: 8 % (ref 3–12)
NRBC AUTOMATED: 0 PER 100 WBC
PDW BLD-RTO: 13.8 % (ref 11.8–14.4)
PLATELET # BLD AUTO: 272 K/UL (ref 138–453)
PMV BLD AUTO: 10.6 FL (ref 8.1–13.5)
POTASSIUM SERPL-SCNC: 4.8 MMOL/L (ref 3.7–5.3)
PROTHROMBIN TIME: 37.7 SEC (ref 9.1–12.3)
RBC # BLD: 2.6 M/UL (ref 4.21–5.77)
SEG NEUTROPHILS: 81 % (ref 36–65)
SEGMENTED NEUTROPHILS ABSOLUTE COUNT: 13.53 K/UL (ref 1.5–8.1)
SODIUM SERPL-SCNC: 138 MMOL/L (ref 135–144)
WBC # BLD AUTO: 16.7 K/UL (ref 3.5–11.3)

## 2023-02-20 PROCEDURE — 6370000000 HC RX 637 (ALT 250 FOR IP): Performed by: INTERNAL MEDICINE

## 2023-02-20 PROCEDURE — 6360000002 HC RX W HCPCS: Performed by: NURSE PRACTITIONER

## 2023-02-20 PROCEDURE — 2580000003 HC RX 258: Performed by: INTERNAL MEDICINE

## 2023-02-20 PROCEDURE — 86038 ANTINUCLEAR ANTIBODIES: CPT

## 2023-02-20 PROCEDURE — 85018 HEMOGLOBIN: CPT

## 2023-02-20 PROCEDURE — 6370000000 HC RX 637 (ALT 250 FOR IP): Performed by: STUDENT IN AN ORGANIZED HEALTH CARE EDUCATION/TRAINING PROGRAM

## 2023-02-20 PROCEDURE — 97530 THERAPEUTIC ACTIVITIES: CPT

## 2023-02-20 PROCEDURE — 97167 OT EVAL HIGH COMPLEX 60 MIN: CPT

## 2023-02-20 PROCEDURE — 83516 IMMUNOASSAY NONANTIBODY: CPT

## 2023-02-20 PROCEDURE — 6370000000 HC RX 637 (ALT 250 FOR IP): Performed by: NURSE PRACTITIONER

## 2023-02-20 PROCEDURE — 82947 ASSAY GLUCOSE BLOOD QUANT: CPT

## 2023-02-20 PROCEDURE — 83521 IG LIGHT CHAINS FREE EACH: CPT

## 2023-02-20 PROCEDURE — 86334 IMMUNOFIX E-PHORESIS SERUM: CPT

## 2023-02-20 PROCEDURE — 99222 1ST HOSP IP/OBS MODERATE 55: CPT | Performed by: INTERNAL MEDICINE

## 2023-02-20 PROCEDURE — 86160 COMPLEMENT ANTIGEN: CPT

## 2023-02-20 PROCEDURE — 85610 PROTHROMBIN TIME: CPT

## 2023-02-20 PROCEDURE — 85025 COMPLETE CBC W/AUTO DIFF WBC: CPT

## 2023-02-20 PROCEDURE — 1200000000 HC SEMI PRIVATE

## 2023-02-20 PROCEDURE — 86225 DNA ANTIBODY NATIVE: CPT

## 2023-02-20 PROCEDURE — 80048 BASIC METABOLIC PNL TOTAL CA: CPT

## 2023-02-20 PROCEDURE — 85014 HEMATOCRIT: CPT

## 2023-02-20 PROCEDURE — 99233 SBSQ HOSP IP/OBS HIGH 50: CPT | Performed by: INTERNAL MEDICINE

## 2023-02-20 PROCEDURE — 76770 US EXAM ABDO BACK WALL COMP: CPT

## 2023-02-20 PROCEDURE — 36415 COLL VENOUS BLD VENIPUNCTURE: CPT

## 2023-02-20 PROCEDURE — 97163 PT EVAL HIGH COMPLEX 45 MIN: CPT

## 2023-02-20 RX ORDER — MIDODRINE HYDROCHLORIDE 2.5 MG/1
2.5 TABLET ORAL 3 TIMES DAILY PRN
Status: DISCONTINUED | OUTPATIENT
Start: 2023-02-20 | End: 2023-02-28 | Stop reason: HOSPADM

## 2023-02-20 RX ORDER — PHYTONADIONE 5 MG/1
5 TABLET ORAL ONCE
Status: COMPLETED | OUTPATIENT
Start: 2023-02-20 | End: 2023-02-20

## 2023-02-20 RX ORDER — 0.9 % SODIUM CHLORIDE 0.9 %
250 INTRAVENOUS SOLUTION INTRAVENOUS ONCE
Status: COMPLETED | OUTPATIENT
Start: 2023-02-20 | End: 2023-02-20

## 2023-02-20 RX ADMIN — FINASTERIDE 5 MG: 5 TABLET, FILM COATED ORAL at 08:29

## 2023-02-20 RX ADMIN — PHYTONADIONE 5 MG: 5 TABLET ORAL at 18:02

## 2023-02-20 RX ADMIN — TAMSULOSIN HYDROCHLORIDE 0.4 MG: 0.4 CAPSULE ORAL at 08:29

## 2023-02-20 RX ADMIN — METRONIDAZOLE 500 MG: 500 TABLET, FILM COATED ORAL at 15:20

## 2023-02-20 RX ADMIN — SODIUM CHLORIDE 250 ML: 9 INJECTION, SOLUTION INTRAVENOUS at 15:17

## 2023-02-20 RX ADMIN — ATROPA BELLADONNA AND OPIUM 60 MG: 16.2; 6 SUPPOSITORY RECTAL at 12:58

## 2023-02-20 RX ADMIN — MORPHINE SULFATE 2 MG: 2 INJECTION, SOLUTION INTRAMUSCULAR; INTRAVENOUS at 18:04

## 2023-02-20 RX ADMIN — METRONIDAZOLE 500 MG: 500 TABLET, FILM COATED ORAL at 05:55

## 2023-02-20 RX ADMIN — CEPHALEXIN 500 MG: 500 CAPSULE ORAL at 21:06

## 2023-02-20 RX ADMIN — MORPHINE SULFATE 2 MG: 2 INJECTION, SOLUTION INTRAMUSCULAR; INTRAVENOUS at 11:13

## 2023-02-20 RX ADMIN — HYDRALAZINE HYDROCHLORIDE 50 MG: 50 TABLET, FILM COATED ORAL at 10:36

## 2023-02-20 RX ADMIN — CEPHALEXIN 500 MG: 500 CAPSULE ORAL at 05:55

## 2023-02-20 RX ADMIN — SODIUM CHLORIDE: 9 INJECTION, SOLUTION INTRAVENOUS at 04:35

## 2023-02-20 RX ADMIN — AMLODIPINE BESYLATE 5 MG: 5 TABLET ORAL at 10:36

## 2023-02-20 RX ADMIN — CEPHALEXIN 500 MG: 500 CAPSULE ORAL at 15:20

## 2023-02-20 RX ADMIN — ATORVASTATIN CALCIUM 80 MG: 80 TABLET, FILM COATED ORAL at 08:29

## 2023-02-20 RX ADMIN — MIDODRINE HYDROCHLORIDE 2.5 MG: 2.5 TABLET ORAL at 16:05

## 2023-02-20 RX ADMIN — METRONIDAZOLE 500 MG: 500 TABLET, FILM COATED ORAL at 21:07

## 2023-02-20 RX ADMIN — Medication 1000 MCG: at 08:29

## 2023-02-20 ASSESSMENT — PAIN DESCRIPTION - LOCATION: LOCATION: GROIN

## 2023-02-20 ASSESSMENT — ENCOUNTER SYMPTOMS
ABDOMINAL PAIN: 0
SHORTNESS OF BREATH: 0
DIARRHEA: 1
COUGH: 0
NAUSEA: 0
VOMITING: 0

## 2023-02-20 ASSESSMENT — PAIN DESCRIPTION - ORIENTATION: ORIENTATION: MID

## 2023-02-20 ASSESSMENT — PAIN SCALES - GENERAL
PAINLEVEL_OUTOF10: 6
PAINLEVEL_OUTOF10: 8

## 2023-02-20 ASSESSMENT — PAIN DESCRIPTION - DESCRIPTORS: DESCRIPTORS: ACHING

## 2023-02-20 NOTE — H&P
Cherry Lester MD on call for urology to inform him that this patient had 3 way irrigating being irrigated with Sterile water. Asked if this was okay or if it needed to be switched over to Sodium chloride. Dr. Nely Lester asked that this be switched over to Sodium Chloride. Patient switched quickly over to Sodium Chloride.

## 2023-02-20 NOTE — PLAN OF CARE
Problem: Discharge Planning  Goal: Discharge to home or other facility with appropriate resources  Outcome: Progressing     Problem: Skin/Tissue Integrity  Goal: Absence of new skin breakdown  Description: 1. Monitor for areas of redness and/or skin breakdown  2. Assess vascular access sites hourly  3. Every 4-6 hours minimum:  Change oxygen saturation probe site  4. Every 4-6 hours:  If on nasal continuous positive airway pressure, respiratory therapy assess nares and determine need for appliance change or resting period.   Outcome: Progressing     Problem: Safety - Adult  Goal: Free from fall injury  Outcome: Progressing     Problem: Pain  Goal: Verbalizes/displays adequate comfort level or baseline comfort level  Outcome: Progressing     Problem: Chronic Conditions and Co-morbidities  Goal: Patient's chronic conditions and co-morbidity symptoms are monitored and maintained or improved  Outcome: Progressing     Problem: Nutrition Deficit:  Goal: Optimize nutritional status  Outcome: Progressing

## 2023-02-20 NOTE — PROGRESS NOTES
Urology Progress Note      Subjective: Blondell Yohannes is a [de-identified] y.o. male. His/Her current Diet is: Diet NPO Exceptions are: Sips of Water with Meds. Since the previous note, the patient reports the following:  No acute issues overnight. No fevers or chills. No nausea or vomiting. Pain Controlled. Vitals and Labs:  Vitals:    02/19/23 1400 02/19/23 1954 02/19/23 2309 02/20/23 0441   BP:  (!) 109/90 (!) 106/48 (!) 93/51   Pulse: 64 54 53 58   Resp: 21 16 13 19   Temp:  97.5 °F (36.4 °C) 97.5 °F (36.4 °C) 97.5 °F (36.4 °C)   TempSrc:  Oral Oral Oral   SpO2:  100% 96% 98%   Weight:       Height:         I/O last 3 completed shifts: In: 443.9 [P.O.:320; I.V.:123.9]  Out: 1925 [PNTVW:7278]    Recent Labs     02/19/23  0946 02/20/23  0255   WBC 19.8* 16.7*   HGB 8.9* 7.6*   HCT 29.4* 26.4*   MCV 96.4 101.5    272     Recent Labs     02/19/23  0946 02/19/23  1532 02/20/23  0255    138 138   K 5.5* 5.0 4.8    105 105   CO2 19* 18* 20   BUN 31* 32* 36*   CREATININE 2.66* 3.60* 4.28*       No results for input(s): COLORU, PHUR, LABCAST, WBCUA, RBCUA, MUCUS, TRICHOMONAS, YEAST, BACTERIA, CLARITYU, SPECGRAV, LEUKOCYTESUR, UROBILINOGEN, BILIRUBINUR, BLOODU in the last 72 hours. Invalid input(s): NITRATE, GLUCOSEUKETONESUAMORPHOUS    Physical Exam:  NAD  A/O x 3  RRR  No accessory muscles of inspiration  Abdomen soft, non-tender, non-distended. No CVA tenderness.   Mason clear urine output on low moderate CBI    Impression:  [de-identified] yo male with  Clot retention  Atrial fibrillation on coumadin  Recent urolift implantation on 1/31/2023  History of bladder cancer s/p TURBT in 9/2022  Patient Active Problem List   Diagnosis    Atrial fibrillation (HCC)    Benign prostatic hyperplasia with urinary frequency    Cardiomyopathy ([address]    Coronary atherosclerosis    Gout    Obesity    PVD (peripheral vascular disease) (HCC)    Right ventricular dilation    Tricuspid regurgitation    Type 2 diabetes mellitus with diabetic polyneuropathy (HCC)    Malignant neoplasm of overlapping sites of bladder (HCC)    Clot retention of urine    Gross hematuria    On continuous oral anticoagulation    Colitis    Bladder carcinoma (HCC)    MATTI (acute kidney injury) (HCC)    Hyperkalemia       Plan:  Clamp CBI, monitor urine color, can have regular diet if color remains clear in 2 hours    Void trial later today if passed clamp trial     Hold anticoagulation if OK per primary team     Monitor Hemoglobin, transfuse as needed for Hgb < 7    Monitor creatinine      Jose Oneill MD  7:47 AM 2/20/2023

## 2023-02-20 NOTE — PROGRESS NOTES
Comprehensive Nutrition Assessment    Type and Reason for Visit:  Initial, Positive Nutrition Screen (Weight loss)    Nutrition Recommendations/Plan:   Recommend 75 gm CHO per meal to better meet nutritional needs. Add ONS if/as needed. Monitor need for diet education if appropriate. Malnutrition Assessment:  Malnutrition Status: At risk for malnutrition (Comment) (02/20/23 1119)    Context:  Social/Environmental Circumstances     Findings of the 6 clinical characteristics of malnutrition:  Energy Intake:  No significant decrease in energy intake (per pt)  Weight Loss:  Mild weight loss (specify amount and time period) (6.7% x 3-5 months per pt statement / EHR)     Body Fat Loss:  No significant body fat loss     Muscle Mass Loss:  No significant muscle mass loss    Fluid Accumulation:  Mild Extremities   Strength:  Not Performed    Nutrition Assessment:    Presented with hematuria. Diet recently advanced from NPO to carb controlled. Pt states his appetite has been good PTA, but reports weight loss of ~25 lbs in past 3 months or so. Attributes weight loss to recent loss of multiple family members, including his wife. Has been eating meals as the senior center [facility] his home. Pt states he does not check his blood sugars at home because they have been \"good\". Pt was unsure what a carbohydrate was. Nutrition Related Findings:    meds/labs reviewed Wound Type: None       Current Nutrition Intake & Therapies:    Average Meal Intake: Unable to assess  Average Supplements Intake: None Ordered  ADULT DIET; Regular; 5 carb choices (75 gm/meal)    Anthropometric Measures:  Height: 5' 11\" (180.3 cm)  Ideal Body Weight (IBW): 172 lbs (78 kg)       Current Body Weight: 290 lb 2 oz (131.6 kg), 168.7 % IBW.     Current BMI (kg/m2): 40.5  Usual Body Weight: 311 lb (141.1 kg) (9/15/22 per EHR)  % Weight Change (Calculated): -6.7                    BMI Categories: Obese Class 3 (BMI 40.0 or greater)    Estimated Daily Nutrient Needs:  Energy Requirements Based On: Formula  Weight Used for Energy Requirements: Current  Energy (kcal/day): 7092-3574 kcals/day  Weight Used for Protein Requirements: Ideal  Protein (g/day): 115-140 gm/day  Method Used for Fluid Requirements: Other (Comment)  Fluid (ml/day): 2400 mL/day (Mayra Prince) or per MD    Nutrition Diagnosis:   Unintended weight loss related to psychological cause or life stress as evidenced by weight loss (~6.7% loss x past several months)    Nutrition Interventions:   Food and/or Nutrient Delivery: Modify Current Diet  Nutrition Education/Counseling: Survival skills/brief education completed (Reviewed what carbohydrates were and why they were being monitored in his diet)  Coordination of Nutrition Care: Continue to monitor while inpatient  Plan of Care discussed with: Pt    Goals:  Previous Goal Met:  (goal set)  Goals: PO intake 75% or greater, prior to discharge       Nutrition Monitoring and Evaluation:   Behavioral-Environmental Outcomes: None Identified  Food/Nutrient Intake Outcomes: Food and Nutrient Intake  Physical Signs/Symptoms Outcomes: Weight, Meal Time Behavior    Discharge Planning:     Too soon to determine     Michael Tejeda MS, RD, LD  Contact: [phone]

## 2023-02-20 NOTE — PROGRESS NOTES
Occupational Therapy  Facility/Department: [facility]  Occupational Therapy Initial Assessment    Name: Ketty Hernandez  : 1940  MRN: [id number]  Date of Service: 2023  Chief Complaint   Patient presents with    Hematuria     Transfer from [facility] d/t hematuria. Pt with history of cystoscopy with bladder bioposy and fulguration, urolift x 4  per Dr. Abiel Bhat on . Pt with urinary retention, dribbling and passing blood clots today. Pt with history of atrial fibrillation, currently on Coumadin. INR-3.71 today. Pt reports he has held his coumadin for the past three days. Pt arrives with casey catheter in place with scant amount of bright red blood. Discharge Recommendations:  Patient would benefit from continued therapy after discharge  OT Equipment Recommendations  Equipment Needed: Yes  Mobility Devices: ADL Assistive Devices  ADL Assistive Devices: Toileting - Drop Arm Commode, Heavy Duty Drop Arm Commode;Reacher;Sock-Aid Hard       Patient Diagnosis(es): The primary encounter diagnosis was Urinary retention. A diagnosis of MATTI (acute kidney injury) ([address] was also pertinent to this visit. Past Medical History:  has a past medical history of Atrial fibrillation ([address] Benign prostatic hyperplasia with urinary frequency, Cancer ([address] Cardiomyopathy ([address] CKD (chronic kidney disease), Coronary atherosclerosis, Gout, Morbid obesity with body mass index (BMI) of 45.0 to 49.9 in adult [facility]), Obesity, Pulmonary hypertension ([address] PVD (peripheral vascular disease) ([address] Right ventricular dilation, Tricuspid regurgitation, and Type 2 diabetes mellitus with diabetic polyneuropathy ([address] Past Surgical History:  has a past surgical history that includes back surgery; Knee Arthroplasty (Left); Colonoscopy (N/A); Colonoscopy w/ biopsies (N/A, 2022); Cystoscopy; Finger surgery;  Cystoscopy; Transurethral resection of bladder tumor; Cystocopy (2022); and Cystoscopy (N/A, 9/23/2022). Assessment   Performance deficits / Impairments: Decreased functional mobility ; Decreased ADL status; Decreased endurance;Decreased high-level IADLs;Decreased safe awareness;Decreased balance;Decreased strength;Decreased cognition;Decreased fine motor control  Assessment: Patient completed bed mobility at Mod A x2 to sit EOB and engage in static/dynamic seated tasks. Pt completed functional transfers at [address] [place] [address] with bed raised using RW and completed functional mobility from EOB > chair at [address] [place] [address]. Pt significantly limited d/t hypotension, see activity tolerance, impacting progression of activity this date. Patient would benefit from continued acute OT services to address functional deficits through skilled intervention impacting performance and safety with ADLs/IADLs  Prognosis: Good  Decision Making: High Complexity  REQUIRES OT FOLLOW-UP: Yes  Activity Tolerance  Activity Tolerance: Treatment limited secondary to medical complications (free text)  Activity Tolerance Comments: Patient with c/o dizziness when beginning functional mobility to chair, BP assessed in recliner 65/40, reclined in chair 65/34, following rafaela stedy to trendelenburg in bed 113/67, sitting upright in bed 73/54. RN notified and patient left HOB ~20 degrees upon exit.         Plan   Occupational Therapy Plan  Times Per Week: 3-4x/wk  Current Treatment Recommendations: Strengthening, Balance training, Functional mobility training, Endurance training, Safety education & training, Positioning, Patient/Caregiver education & training, Equipment evaluation, education, & procurement, Self-Care / ADL, Home management training     Restrictions  Restrictions/Precautions  Restrictions/Precautions: Fall Risk  Required Braces or Orthoses?: No  Position Activity Restriction  Other position/activity restrictions: up with assist    Subjective   General  Patient assessed for rehabilitation services?: Yes  Family / Caregiver Present: No  General Comment  Comments: RN ok'd patient for OT session. Pt pleasat, cooperative ad agreeable. Pt declines pain but reports dizziness. Social/Functional History  Social/Functional History  Lives With: Alone  Type of Home: House  Home Layout: One level  Home Access: Ramped entrance  Bathroom Shower/Tub: Walk-in shower  Bathroom Toilet: Handicap height  [employer]: Shower chair, Grab bars in shower, Grab bars around toilet  [address]: Rollator, Walker, standard, Wheelchair-electric, [address] [facility] chair (power w/c primarily with use of rollator for short distaces)  Receives Help From: Family  ADL Assistance: Independent  Homemaking Assistance: Independent  Homemaking Responsibilities: Yes (also has a cat)  Meal Prep Responsibility: Primary  Laundry Responsibility: Primary  Cleaning Responsibility: Primary  Shopping Responsibility: Primary  Ambulation Assistance: Independent  Transfer Assistance: Independent  Active : Yes  Mode of Transportation: Car  Occupation: Retired  Type of Occupation:   Additional Comments: Reports daughter is able to provide 24 hr assistance PRN     Objective   Safety Devices  Type of Devices: Nurse notified;Gait belt;Left in bed;Bed alarm in place;Call light within reach  Restraints  Restraints Initially in Place: No  Balance  Sitting: With support (EOB for ~10-12 min engaging in static/dynamic tasks)  Standing: With support (Min A for safety, static EOB for ~1 min pre/post mobility)  Transfer Training  Transfer Training: Yes  Overall Level of Assistance: Minimum assistance  Interventions: Verbal cues; Safety awareness training (with use of RW and SS)  Sit to Stand: Minimum assistance  Stand to Sit: Minimum assistance  Gait  Overall Level of Assistance: Minimum assistance  Interventions: Verbal cues; Safety awareness training (for proper hand placement to reduce fall risk; good return)  Assistive Device: Gait belt;Walker, rolling (to the chair, use of SS for back to bed d/t hypotension)     AROM: Within functional limits  Strength: Generally decreased, functional (B UE strength 4-/5 grossly)  Coordination: Generally decreased, functional (decreased speed and dexterity)  Tone: Normal  Sensation: Intact (Numbness and tingling (cramping in B LEs))  ADL  Feeding: Independent  Grooming: Stand by assistance; Increased time to complete;Verbal cueing;Setup  UE Bathing: Increased time to complete;Setup;Verbal cueing;Stand by assistance  LE Bathing: Moderate assistance;Verbal cueing;Setup; Increased time to complete  UE Dressing: Increased time to complete;Verbal cueing;Setup;Stand by assistance  LE Dressing: Moderate assistance;Setup;Verbal cueing; Increased time to complete  Toileting: Moderate assistance; Increased time to complete;Setup  Additional Comments: Patient donned gown in supine at SBA prior to mobility. Pt completing self-feeding independently upon arrival. Pt required max A to don socks, although reports not wearing socks at baseline. Activity Tolerance  Activity Tolerance: Treatment limited secondary to medical complications; Patient limited by endurance  Activity Tolerance Comments: limited by hypotension (outlined in ambulation), RN aware  Bed mobility  Supine to Sit: Moderate assistance;2 Person assistance (for trunk and B LE progression)  Sit to Supine: Maximum assistance (for B LE progression)  Scooting: Maximal assistance     Vision  Vision: Impaired  Vision Exceptions: Wears glasses for reading  Hearing  Hearing: Exceptions to [facility]  Hearing Exceptions: Hard of hearing/hearing concerns (hears better in the L than R)  Cognition  Overall Cognitive Status: Exceptions  Following Commands: Follows one step commands with repetition  Attention Span: Difficulty dividing attention; Attends with cues to redirect  Safety Judgement: Decreased awareness of need for assistance  Insights: Decreased awareness of deficits  Initiation: Requires cues for some  Sequencing: Requires cues for some  Orientation  Overall Orientation Status: Within Functional Limits  Orientation Level: Oriented to place;Oriented to time;Oriented to person;Oriented to situation     Education Given To: Patient  Education Provided: Role of Therapy;Transfer Training;Equipment;Precautions;ADL Adaptive Strategies;Plan of Care;Energy Conservation;Fall Prevention Strategies  Education Method: Verbal;Demonstration  Barriers to Learning: Cognition;Hearing  Education Outcome: Verbalized understanding;Continued education needed  AM-PAC Score        AM-PAC Inpatient Daily Activity Raw Score: 16 (02/20/23 1738)  AM-PAC Inpatient ADL T-Scale Score : 35.96 (02/20/23 1738)  ADL Inpatient CMS 0-100% Score: 53.32 (02/20/23 1738)  ADL Inpatient CMS G-Code Modifier : CK (02/20/23 1738)  Goals  Short Term Goals  Time Frame for Short Term Goals: Patient will, by discharge  Short Term Goal 1: demo UB ADLs at Mod I  Short Term Goal 2: demo LB ADLs at Min A using AE PRN  Short Term Goal 3: demo toileting tasks at Min A  Short Term Goal 4: demo functional transfers/mobility using LRD at CGA to engage in ADLs  Short Term Goal 5: demo 10+ min of dynamic standing tolerance at CGA to engage in ADLs  Short Term Goal 6: demo 15+ min of dynamic sitting balance at Supervision to engage in ADLs safely  Short Term Goal 7: notify OTR to update POC as patient progresses     Therapy Time   Individual Concurrent Group Co-treatment   Time In 1349         Time Out 1443         Minutes 54         Timed Code Treatment Minutes: 23 Minutes     Snehal Mcgee, OTR/L

## 2023-02-20 NOTE — PROGRESS NOTES
Physical Therapy  Facility/Department: [facility]  Physical Therapy Initial Assessment    Name: Carlin Mcbride  : 1940  MRN: [id number]  Date of Service: 2023  Chief Complaint   Patient presents with    Hematuria     Transfer from [facility] d/t hematuria. Pt with history of cystoscopy with bladder bioposy and fulguration, urolift x 4  per Dr. Karissa Alcala on . Pt with urinary retention, dribbling and passing blood clots today. Pt with history of atrial fibrillation, currently on Coumadin. INR-3.71 today. Pt reports he has held his coumadin for the past three days. Pt arrives with casey catheter in place with scant amount of bright red blood. Discharge Recommendations: Further therapy recommended at discharge. PT Equipment Recommendations  Equipment Needed: No      Patient Diagnosis(es): The primary encounter diagnosis was Urinary retention. A diagnosis of MATTI (acute kidney injury) ([facility] [facility] was also pertinent to this visit. Past Medical History:  has a past medical history of Atrial fibrillation ([address] Benign prostatic hyperplasia with urinary frequency, Cancer ([address] Cardiomyopathy ([address] CKD (chronic kidney disease), Coronary atherosclerosis, Gout, Morbid obesity with body mass index (BMI) of 45.0 to 49.9 in adult [facility]), Obesity, Pulmonary hypertension ([address] PVD (peripheral vascular disease) ([address] Right ventricular dilation, Tricuspid regurgitation, and Type 2 diabetes mellitus with diabetic polyneuropathy ([address] Past Surgical History:  has a past surgical history that includes back surgery; Knee Arthroplasty (Left); Colonoscopy (N/A); Colonoscopy w/ biopsies (N/A, 2022); Cystoscopy; Finger surgery; Cystoscopy; Transurethral resection of bladder tumor; Cystocopy (2022); and Cystoscopy (N/A, 2022). Assessment   Body Structures, Functions, Activity Limitations Requiring Skilled Therapeutic Intervention: Decreased functional mobility ; Decreased endurance;Decreased strength; Increased pain;Decreased balance;Decreased posture  Assessment: The pt required Mod A x2 for supine to sit, Min A to stand and Min A to ambulate 5ft with RW. Session limited by hypotension this date, outlined in gait below. Recommend continued PT to maximize safety and progress toward prior level of independence. The pt is currently unsafe to return to prior living arrangement due to level of assistance required for mobility and high risk of falls, will continue to assess pending progress. Therapy Prognosis: Good  Decision Making: High Complexity  Requires PT Follow-Up: Yes  Activity Tolerance  Activity Tolerance: Treatment limited secondary to medical complications; Patient limited by endurance  Activity Tolerance Comments: limited by hypotension (outlined in ambulation), RN aware     Plan   Physcial Therapy Plan  General Plan:  (5-6x/wk)  Current Treatment Recommendations: Strengthening, ROM, Balance training, Functional mobility training, Transfer training, Endurance training, Gait training, Stair training, Home exercise program, Safety education & training, Therapeutic activities, Patient/Caregiver education & training  Safety Devices  Type of Devices: Nurse notified, Gait belt, Left in bed, Bed alarm in place, Call light within reach  Restraints  Restraints Initially in Place: No     Restrictions  Restrictions/Precautions  Restrictions/Precautions: Fall Risk  Required Braces or Orthoses?: No  Position Activity Restriction  Other position/activity restrictions: up with assist     Subjective   General  Patient assessed for rehabilitation services?: Yes  Response To Previous Treatment: Not applicable  Family / Caregiver Present: Yes (dtr present upon arrival, exited prior to mobility)  General Comment  Comments: CBI running throughout session, 2L O2 via NC  Subjective  Subjective: RN and pt agreeable to PT. Pt supine in bed upon arrival, very pleasant and cooperative throughout. Social/Functional History  Social/Functional History  Lives With: Alone  Type of Home: House  Home Layout: One level  Home Access: Ramped entrance  Bathroom Shower/Tub: Walk-in shower  Bathroom Toilet: Handicap height  [address]: Shower chair, Grab bars in shower, Grab bars around toilet  [address]: Rollator, Ruffus Owen, standard, Wheelchair-electric, [address] [facility] chair (power w/c primarily with use of rollator for short distaces)  Receives Help From: Family  ADL Assistance: Independent  Homemaking Assistance: Independent  Homemaking Responsibilities: Yes (also has a cat)  Meal Prep Responsibility: Primary  Laundry Responsibility: Primary  Cleaning Responsibility: Primary  Shopping Responsibility: Primary  Ambulation Assistance: Independent  Transfer Assistance: Independent  Active : Yes  Mode of Transportation: Car  Occupation: Retired  Type of Occupation:   Additional Comments: Reports daughter is able to provide 24 hr assistance PRN  Vision/Hearing  Vision  Vision: Impaired  Vision Exceptions: Wears glasses for reading  Hearing  Hearing: Exceptions to [facility]  Hearing Exceptions: Hard of hearing/hearing concerns (hears better in the L than R)    Cognition   Cognition  Overall Cognitive Status: Exceptions  Following Commands: Follows one step commands with repetition  Attention Span: Difficulty dividing attention; Attends with cues to redirect  Safety Judgement: Decreased awareness of need for assistance  Insights: Decreased awareness of deficits  Initiation: Requires cues for some  Sequencing: Requires cues for some     Objective     Gross Assessment  Tone: Normal  Sensation: Intact     Joint Mobility  Spine: WFL  ROM RLE: WFL  ROM LLE: WFL  ROM RUE: WFL  ROM LUE: WFL  Strength RLE  Strength RLE: WFL  Comment: except hip flexion 3-/5  Strength LLE  Strength LLE: WFL  Comment: except hip flexion 3-/5  Strength RUE  Strength RUE: WFL  Comment: formally assessed by OT  Strength LUE  Strength LUE: WFL  Comment: formally assessed by OT           Bed mobility  Supine to Sit: Moderate assistance;2 Person assistance (for BLE and trunk progression)  Sit to Supine: Maximum assistance (for BLE progression)  Scooting: Maximal assistance  Transfers  Sit to Stand: Minimal Assistance (performed one time with RW and elevated surface, one time with rafaela stedy and lower surface)  Stand to Sit: Minimal Assistance (to control descent)  Bed to Chair: Minimal assistance (with RW, dependent to return to bed due to hypotension)  Ambulation  Surface: Level tile  Device: Rolling Walker  Other Apparatus: O2 (2L)  Assistance: Minimal assistance  Quality of Gait: flexed posture, decreased endurance, fatigues quickly  Gait Deviations: Slow Samia; Shuffles;Decreased step length;Decreased step height  Distance: 5ft  Comments: during ambulation, pt c/o dizziness, able to continue to chair. BP when seated upright in chair 65/40, reclined in chair 65/34, following rafaela stedy to trendelenburg in bed 113/67, sitting upright in bed 73/54. Following initial onset of dizziness, pt remained dizzy throughout session. RN notified and present for chair to bed transfer and aware of pt's BP upon therapist's exit.   Stairs/Curb  Stairs?: No     Balance  Posture: Fair  Sitting - Static: Fair;+  Sitting - Dynamic: Fair  Standing - Static: Fair  Standing - Dynamic: Fair;-  Comments: standing balance assessed with RW; standing 2x with RW and rafaela stedy with CGA for static standing; sitting unsupported ~15 minutes with SBA-CGA       AM-PAC Score  AM-PAC Inpatient Mobility Raw Score : 13 (02/20/23 1501)  AM-PAC Inpatient T-Scale Score : 36.74 (02/20/23 1501)  Mobility Inpatient CMS 0-100% Score: 64.91 (02/20/23 1501)  Mobility Inpatient CMS G-Code Modifier : CL (02/20/23 1501)        Goals  Short Term Goals  Time Frame for Short Term Goals: 14 visits  Short Term Goal 1: Perform bed mobility independently  Short Term Goal 2: Perform functional transfers Mod I  Short Term Goal 3: Ambulate 100ft with RW and SBA  Short Term Goal 4: Demo Fair+ dynamic standing balance to decrease risk of falls       Education  Patient Education  Education Given To: Patient  Education Provided: Role of Therapy;Plan of Care;Transfer Training  Education Method: Verbal  Barriers to Learning: Hearing  Education Outcome: Verbalized understanding;Continued education needed      Therapy Time   Individual Concurrent Group Co-treatment   Time In 1349         Time Out 1444         Minutes 55         Timed Code Treatment Minutes: 25 Minutes; co-eval with OT       Marcos Land, PT

## 2023-02-20 NOTE — PROGRESS NOTES
Brennen Hernandez MD on call for urology to inform him that this patient had 3 way irrigating being irrigated with Sterile water. Asked if this was okay or if it needed to be switched over to Sodium chloride. Dr. Yanique Hernandez asked that this be switched over to Sodium Chloride.  Patient switched quickly over to Sodium Chloride

## 2023-02-20 NOTE — CARE COORDINATION
Case Management Assessment  Initial Evaluation    Date/Time of Evaluation: 2/20/2023 11:55AM  Assessment Completed by: Gabi Cruz RN    If patient is discharged prior to next notation, then this note serves as note for discharge by case management. Patient Name: Karin Cedeño                   YOB: 1940  Diagnosis: Urinary retention [R33.9]  MATTI (acute kidney injury) ([facility] [N17.9]                   Date / Time: 2/19/2023 12:32 AM    Patient Admission Status: Inpatient   Readmission Risk (Low < 19, Mod (19-27), High > 27): Readmission Risk Score: 18.4    Current PCP: Keisha Arguelles  PCP verified by CM? Yes Colonel Winter)    Chart Reviewed: Yes      History Provided by: Patient  Patient Orientation: Alert and Oriented, Person, Place, Situation    Patient Cognition: Alert    Hospitalization in the last 30 days (Readmission):  No    If yes, Readmission Assessment in CM Navigator will be completed. Advance Directives:      Code Status: Full Code   Patient's Primary Decision Maker is: Legal Next of Kin      Discharge Planning:    Patient lives with: Alone Type of Home: House  Primary Care Giver: Self  Patient Support Systems include: Children   Current Financial resources: Medicare  Current community resources: ECF/Home Care  Current services prior to admission: Durable Medical Equipment            Current DME: Wheelchair, Walker            Type of Home Care services:  None    ADLS  Prior functional level: Independent in ADLs/IADLs  Current functional level: Independent in ADLs/IADLs    PT AM-PAC:   /24  OT AM-PAC:   /24    Family can provide assistance at DC: Would you like Case Management to discuss the discharge plan with any other family members/significant others, and if so, who?     Plans to Return to Present Housing: Yes  Other Identified Issues/Barriers to RETURNING to current housing: none  Potential Assistance needed at discharge: Home Care            Potential DME:    Patient expects to discharge to: [facility] for transportation at discharge:  children to transport    Financial    Payor: [facility] / Plan: [address] / Product Type: *No Product type* /     Does insurance require precert for SNF: Yes, but plan is home    Potential assistance Purchasing Medications: No  Meds-to-Beds request: Yes      [facility] [id number] [place] Me[place], [place] [address]  [address] [address]  Phone: [phone] Fax: [phone]      Notes:    Factors facilitating achievement of predicted outcomes: Family support, Motivated, Cooperative, and Pleasant    Barriers to discharge: none    Additional Case Management Notes: Transitional planning-talked with patient. Plan is to go home alone, has ride. Verified address, emergency contact, and insurance with patient. Wanting home care. 1200PM Home Care List given to patient-wants to discuss with his daughter    The Plan for Transition of Care is related to the following treatment goals of Urinary retention [R33.9]  MATTI (acute kidney injury) ([facility] [address] [R02.6]    IF APPLICABLE: The Patient and/or patient representative Hiwot Mcknight and his family were provided with a choice of provider and agrees with the discharge plan. Freedom of choice list with basic dialogue that supports the patient's individualized plan of care/goals and shares the quality data associated with the providers was provided to: (P) Patient   Patient Representative Name:       The Patient and/or Patient Representative Agree with the Discharge Plan?  (P) Yes    Melody Sommer RN  Case Management Department  Ph: [phone] Fax: [phone] Awake

## 2023-02-20 NOTE — CONSULTS
Nephrology Consult Note    Reason for Consult: Acute kidney injury  Requesting Physician:  Eric Melendez    Chief Complaint: Gross hematuria with clots  History Obtained From:  patient, family member -daughter, electronic medical record    History of Present Illness: This is a [de-identified] y.o. male who presents with history of BPH with the patient is status post UroLift procedure. He presented to an outside hospital with bleeding from the urinary tract, low urine output and subsequent discovery of blood clots with obstruction of the urinary tract. He also has a history of bladder cancer, atrial fibrillation on chronic anticoagulation, cardiomyopathy, atherosclerotic vascular disease, gout and morbid obesity, type 2 diabetes mellitus, tricuspid valve regurgitation, pulmonary hypertension, peripheral vascular disease, right ventricular dilatation. He apparently also has significant hypertension within the past couple of days in the setting of discomfort associated with blood clots in the urinary tract system. He was found to have worsening renal function and the serum creatinine continues to rise rapidly. He is currently on bladder irrigation. Labs today show BUN 36 with creatinine 4.28 with calcium 8, sodium 138 potassium 4.8 chloride 105 and CO2 20 with INR of 3.9. CBC shows white blood cell 16.7 with hemoglobin 7.6 and platelets 808. Of note, creatinine yesterday was three-point 6 in the afternoon and 2.66 earlier in the morning. Distant lab data from 2022 showed creatinine 1.5 with sodium 141 potassium 5.7 chloride 112 and CO2 19 at that time. Hemoglobin was 10.3 at that time. Past surgical history includes cystoscopy, UroLift procedures, multiple back surgeries, colonoscopy with history of diverticulosis and removal of adenomatous polyps, TURP, arthroscopy. Medications are reviewed. No known drug allergies. Patient is .   His wife just  in 2022, per the patient's daughter.  Patient's children are healthy.  The patient is the youngest of 12 children with apparently about 5 siblings still living.  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  Per the chart, he is a never smoker.    Past Medical History:        Diagnosis Date    Atrial fibrillation (HCC) 07/01/2022    Benign prostatic hyperplasia with urinary frequency 03/17/2018    Cancer (HCC)     bladder    Cardiomyopathy (HCC) 05/22/2019    CKD (chronic kidney disease)     Coronary atherosclerosis 07/01/2022    Gout 08/18/2021    Morbid obesity with body mass index (BMI) of 45.0 to 49.9 in adult (HCC) 07/01/2022    Obesity     Pulmonary hypertension (HCC)     PVD (peripheral vascular disease) (HCC) 05/22/2019    Right ventricular dilation 04/01/2018    Tricuspid regurgitation 04/01/2018    Formatting of this note might be different from the original. mild  Formatting of this note might be different from the original. mild    Type 2 diabetes mellitus with diabetic polyneuropathy (HCC) 07/01/2022       Past Surgical History:        Procedure Laterality Date    BACK SURGERY      three times    COLON[facility] N/A     [facility]    COLONOSCOPY W/ BIOPSIES N/A 09/12/2022    diverticulosis, x 4 adenoma polyps removed, done @ [facility] by Dr. King.    CYSTOSCOPY      CYSTOSCOPY      w bladder biospy    CYSTOSCOPY  09/23/2022    CYSTOSCOPY TRANSURETHRAL RESECTION BLADDER WITH INSTILLATION GEMCITIBINE    CYSTOSCOPY N/A 9/23/2022    CYSTOSCOPY TRANSURETHRAL RESECTION BLADDER WITH INSTILLATION GEMCITIBINE performed by Garrison Motley Jr., MD at [facility] OR    FINGER SURGERY      KNEE ARTHROPLASTY Left     TRANSURETHRAL RESECTION OF BLADDER TUMOR         Current Medications:    amLODIPine (NORVASC) tablet 5 mg, Daily  atorvastatin (LIPITOR) tablet 80 mg, Daily  finasteride (PROSCAR) tablet 5 mg, Daily  glipiZIDE (GLUCOTROL) tablet 5 mg, QAM AC  hydrALAZINE (APRESOLINE) tablet 50 mg,  BID  [Held by provider] lisinopril (PRINIVIL;ZESTRIL) tablet 20 mg, BID  [Held by provider] metFORMIN (GLUCOPHAGE) tablet 1,000 mg, Daily with breakfast  vitamin B-12 (CYANOCOBALAMIN) tablet 1,000 mcg, Daily  tamsulosin (FLOMAX) capsule 0.4 mg, Daily  [Held by provider] potassium chloride (KLOR-CON M) extended release tablet 10 mEq, Daily  sodium chloride flush 0.9 % injection 5-40 mL, 2 times per day  sodium chloride flush 0.9 % injection 10 mL, PRN  0.9 % sodium chloride infusion, PRN  potassium chloride (KLOR-CON M) extended release tablet 40 mEq, PRN   Or  potassium bicarb-citric acid (EFFER-K) effervescent tablet 40 mEq, PRN   Or  potassium chloride 10 mEq/100 mL IVPB (Peripheral Line), PRN  magnesium sulfate 1000 mg in dextrose 5% 100 mL IVPB, PRN  ondansetron (ZOFRAN-ODT) disintegrating tablet 4 mg, Q8H PRN   Or  ondansetron (ZOFRAN) injection 4 mg, Q6H PRN  polyethylene glycol (GLYCOLAX) packet 17 g, Daily PRN  acetaminophen (TYLENOL) tablet 650 mg, Q6H PRN   Or  acetaminophen (TYLENOL) suppository 650 mg, Q6H PRN  morphine (PF) injection 2 mg, Q4H PRN  opium-belladonna (B&O SUPPRETTES) 16.2-60 MG suppository 60 mg, Q8H PRN  glucose chewable tablet 16 g, PRN  dextrose bolus 10% 125 mL, PRN   Or  dextrose bolus 10% 250 mL, PRN  glucagon (rDNA) injection 1 mg, PRN  dextrose 10 % infusion, Continuous PRN  metroNIDAZOLE (FLAGYL) tablet 500 mg, 3 times per day  cephALEXin (KEFLEX) capsule 500 mg, 3 times per day  insulin lispro (HUMALOG) injection vial 0-8 Units, TID WC  insulin lispro (HUMALOG) injection vial 0-4 Units, Nightly  glucose chewable tablet 16 g, PRN  dextrose bolus 10% 125 mL, PRN   Or  dextrose bolus 10% 250 mL, PRN  glucagon (rDNA) injection 1 mg, PRN  dextrose 10 % infusion, Continuous PRN  0.9 % sodium chloride infusion, Continuous        Allergies:  Patient has no known allergies.     Social History:   Social History     Socioeconomic History    Marital status:      Spouse name: Not on file    Number of children: Not on file    Years of education: Not on file    Highest education level: Not on file   Occupational History    Not on file   Tobacco Use    Smoking status: Never    Smokeless tobacco: Never   Substance and Sexual Activity    Alcohol use: Not Currently    Drug use: Never    Sexual activity: Not on file   Other Topics Concern    Not on file   Social History Narrative    Not on file     Social Determinants of Health     Financial Resource Strain: Not on file   Food Insecurity: Not on file   Transportation Needs: Not on file   Physical Activity: Not on file   Stress: Not on file   Social Connections: Not on file   Intimate Partner Violence: Not on file   Housing Stability: Not on file       Family History:   Family History   Problem Relation Age of Onset    Heart Disease Mother     Breast Cancer Sister     Cancer Brother         prostate       Review of Systems:    Pertinent positives stated above in HPI. All other systems were reviewed and were negative. He denies any chest pain or shortness of breath nausea vomiting or belly pain. He is tolerating the bladder irrigation quite well. No significant peripheral edema.     Objective:  CURRENT TEMPERATURE:  Temp: 98.1 °F (36.7 °C)  MAXIMUM TEMPERATURE OVER 24HRS:  Temp (24hrs), Av.6 °F (36.4 °C), Min:97.5 °F (36.4 °C), Max:98.1 °F (36.7 °C)    CURRENT RESPIRATORY RATE:  Resp: 15  CURRENT PULSE:  Heart Rate: 65  CURRENT BLOOD PRESSURE:  BP: (!) 100/44  24HR BLOOD PRESSURE RANGE:  Systolic (99YVD), XXB:087 , Min:93 , CAT:290   ; Diastolic (96ZLL), GIS:56, Min:44, Max:90    24HR INTAKE/OUTPUT:    Intake/Output Summary (Last 24 hours) at 2023 1444  Last data filed at 2023 0600  Gross per 24 hour   Intake 123.85 ml   Output 1925 ml   Net -1801.15 ml       Physical Exam:  General appearance: Awake, alert, in no acute distress  Skin: warm and dry, no rash or erythema  Eyes: conjunctivae pale and sclera anicteric  ENT: Moist mucous membranes and no obvious thrush  Neck: No JVD, midline trachea no accessory muscle use   Pulmonary: clear to auscultation bilaterally- no wheezes, rales or rhonchi, normal air movement, no respiratory distress  Cardiovascular: Irregularly irregular rhythm Abdomen: soft, non-tender, protuberant and not significantly distended with active bowel sounds and no obvious ascites   Extremities: no cyanosis, clubbing or edema    Labs:   CBC:   Recent Labs     02/19/23  0946 02/20/23  0255   WBC 19.8* 16.7*   RBC 3.05* 2.60*   HGB 8.9* 7.6*   HCT 29.4* 26.4*   MCV 96.4 101.5   MCH 29.2 29.2   MCHC 30.3 28.8   RDW 13.5 13.8    272   MPV 10.5 10.6      BMP:   Recent Labs     02/19/23  0946 02/19/23  1532 02/20/23  0255    138 138   K 5.5* 5.0 4.8    105 105   CO2 19* 18* 20   BUN 31* 32* 36*   CREATININE 2.66* 3.60* 4.28*   GLUCOSE 202* 191* 113*   CALCIUM 8.8 8.2* 8.0*        Phosphorus:  No results for input(s): PHOS in the last 72 hours.  Magnesium: No results for input(s): MG in the last 72 hours.  Albumin: No results for input(s): LABALBU in the last 72 hours.      SPEP: No results found for: PROT, ALBCAL, ALBCAL, ALBPCT, LABALPH, A1PCT, LABALPH, A2PCT, LABBETA, BETAPCT, GAMGLOB, GGPCT, PATH  UPEP: No results found for: TPU     C3: No results found for: C3  C4: No results found for: C4  MPO ANCA:  No results found for: MPO .  PR3 ANCA:  No results found for: PR3  Urine Sodium:  No results found for: CONNIE   Urine Potassium:  No results found for: KUR  Urine Chloride:  No results found for: CLU  Urine Ph:  No components found for: PO4U  Urine Osmolarity:  No results found for: OSMOU  Urine Creatinine:  No results found for: LABCREA  Urine Eosinophils: No components found for: EOSU  Urine Protein:  No results found for: TPU  Urinalysis:  U/A: No results found for: NITRU, COLORU, PHUR, LABCAST, WBCUA, RBCUA, MUCUS, TRICHOMONAS, YEAST, BACTERIA, CLARITYU, SPECGRAV, LEUKOCYTESUR, UROBILINOGEN, BILIRUBINUR,  Robles Vasquez, [address] [place] [address]      Radiology:  Reviewed as available. Assessment:  1. Acute kidney injury on top of chronic kidney disease with baseline creatinine 1.5 based on labs from September 2022. It is likely secondary to obstructive uropathy with clots and now the patient likely has ATN as the patient now is currently on bladder irrigation with free flow of urine  2. Anemia of chronic disease and blood loss  3. Morbid obesity  4. Atrial fibrillation, and anticoagulation, currently on hold because of the bleeding from the urinary tract  5. History of BPH with lower urinary tract symptoms with recent UroLift procedure  6. Currently, blood pressure is low normal  7. Gross hematuria, on bladder irrigation       Plan:  1. Continue normal saline at 75 mL an hour     2. High risk of requiring dialysis within the next couple of days if the renal function does not start to stabilize  3. Check C3, C4, JNOATHAN, ANCA, serum free light chain with ratio, serum protein immunofixation  4. Check a renal ultrasound  5. Management of the patient's gross hematuria per urology  6. CMP with reflex magnesium, phosphorus and CBC with differential in the a.m. tomorrow    Thank you for the consultation. Please do not hesitate to call with questions.     Electronically signed by Una Daly MD on 2/20/2023 at 2:44 PM

## 2023-02-20 NOTE — PROGRESS NOTES
[facility]  Office: [address], DO, Natasha Jordan, DO, Marah Saldaña, DO, Yani Alford Blood, DO, Anamika Peter MD, Elizabeth Brooks MD, Ike Carlson MD, Dana Flores MD,  Fatuma Araiza MD, Vannesa Rm MD, Gerhardt Parody, DO, Eliazar Mosley MD,  Ramirez Cruz MD, Eder Huff MD, Hector Her, DO, Latasha Fuentes MD, Orin Elise MD, Terressa Bath, DO, Urbano Marrero MD, Bryon Brown MD, Polo King MD, Ludivina Zeng MD, Aleida Benedict, DO, Syeda Muñoz MD, Letha Sahni MD, Jeanmarie Daniel, CNP,  Joseph Francois, CNP, Andrew Baron, CNP, Michaela Mendoza, CNP,  Steve Quijano, [facility], Jennifer Hendrix, CNP, Kanchan Bull, CNP, Shelley Jones, CNP, Jing Mello, CNP, Ifeoma Spring, CNP, Christy Moore PA-C, Cole Cardona, CNS, Darnell Trammell, CNP, Cyndy Beauchamp, [facility]    Progress Note    2/20/2023    3:02 PM    Name:   Hien Alicea  MRN:     [id number]     Acct:      [de-identified]   Room:   [address] [place] [address] Day:  1  Admit Date:  2/19/2023 12:32 AM    PCP:   Burke Dennis  Code Status:  Full Code    Subjective:     C/C:   Chief Complaint   Patient presents with    Hematuria     Transfer from [facility] d/t hematuria. Pt with history of cystoscopy with bladder bioposy and fulguration, urolift x 4  per Dr. Harvey Smiley on 01/31/023. Pt with urinary retention, dribbling and passing blood clots today. Pt with history of atrial fibrillation, currently on Coumadin. INR-3.71 today. Pt reports he has held his coumadin for the past three days. Pt arrives with casey catheter in place with scant amount of bright red blood. Interval History Status:   Continuous bladder irrigation stopped  Casey still collecting pink urine  Patient has had orthostatic hypotension  Has felt somewhat constipated    Data Base Updates:  WBC16.7 High k/uL RBC2.60 Low m/uL Hemoglobin7. 6 Low      INR3.9   Coumadin on hold    [id number] High mg/dL     BUN36 High mg/dL   Creatinine4.28 High      Calcium8. 0 Low      Brief History:     As documented in the medical record:  Jarocho Montelongo is a [de-identified] y.o. Non- / non  male who presents with Hematuria (Transfer from [facility] d/t hematuria. Pt with history of cystoscopy with bladder bioposy and fulguration, urolift x 4  per Dr. Scott Mariano on 01/31/023. Pt with urinary retention, dribbling and passing blood clots today. Pt with history of atrial fibrillation, currently on Coumadin. INR-3.71 today. Pt reports he has held his coumadin for the past three days. Pt arrives with casey catheter in place with scant amount of bright red blood. )   and is admitted to the hospital for the management of Clot retention of urine. This is an 81-year-old male with a history of bladder carcinoma with prior cystoscopy and tumor resection. He has chronic atrial fibrillation and is on continuous anticoagulation and presents at this time with difficulty urinating and development of hematuria. Upon evaluation he is found to have clot retention of urine and had a three-way catheter placed for bladder irrigation. He reports a recent diagnosis of colitis on the 16th and was started on Keflex and Flagyl for treatment. \"    The intitial assessment and plan included:  \"  * (Principal) Clot retention of urine 2/19/2023 Yes      Atrial fibrillation ([address] 2/19/2023 Yes     Type 2 diabetes mellitus with diabetic polyneuropathy ([address] (Chronic) 2/19/2023 Yes     Gross hematuria 2/19/2023 Yes     On continuous oral anticoagulation 2/19/2023 Yes     Colitis 2/19/2023 Yes     Bladder carcinoma ([address] (Chronic) 2/19/2023 Yes     MATTI (acute kidney injury) ([address] 2/19/2023 Yes     Hyperkalemia 2/19/2023 Yes        Plan:   Patient status inpatient in the Progressive Unit/Step down     Admit inpatient  Urology evaluation, maintaining three-way Casey catheter and bladder irrigation  Avoid nephrotoxic agents  Repeat BMP this afternoon, if creatinine continues to climb we will consult nephrology. Should see improvement after relieving obstruction baseline creatinine approximate 1.3 in January of this year  Monitor for postobstructive diuresis  Hold Coumadin  Insulin scale for glycemic control  Resume home Flagyl and Keflex was prescribed for colitis a few days ago  Diet as tolerated  GI prophylaxis  Trend labs, follow H&H  Correct electrolytes as needed\"     Database has included:      Past Medical History:   has a past medical history of Atrial fibrillation ([facility] Benign prostatic hyperplasia with urinary frequency, Cancer ([facility] Cardiomyopathy ([facility] CKD (chronic kidney disease), Coronary atherosclerosis, Gout, Morbid obesity with body mass index (BMI) of 45.0 to 49.9 in adult [facility]), Obesity, Pulmonary hypertension ([facility] PVD (peripheral vascular disease) ([facility] Right ventricular dilation, Tricuspid regurgitation, and Type 2 diabetes mellitus with diabetic polyneuropathy ([facility] Social History:   reports that he has never smoked. He has never used smokeless tobacco. He reports that he does not currently use alcohol. He reports that he does not use drugs. Family History:   Family History   Problem Relation Age of Onset    Heart Disease Mother     Breast Cancer Sister     Cancer Brother         prostate       Review of Systems:     Review of Systems   Constitutional:  Positive for activity change (Decreased). Respiratory:  Negative for cough and shortness of breath. Cardiovascular:  Negative for chest pain and palpitations. Gastrointestinal:  Positive for diarrhea (Recently diagnosed with colitis). Negative for abdominal pain, nausea and vomiting. Genitourinary:  Positive for difficulty urinating (See chief complaint) and hematuria. Negative for flank pain. Neurological:  Positive for light-headedness (Orthostatic).        Physical Examination:        Vitals  BP (!) 73/54   Pulse 58   Temp 98.1 °F (36.7 °C) (Oral)   Resp 17   Ht 5' 11\" (1.803 m)   Wt 290 lb 2 oz (131.6 kg)   SpO2 97%   BMI 40.46 kg/m²   Temp (24hrs), Av.6 °F (36.4 °C), Min:97.5 °F (36.4 °C), Max:98.1 °F (36.7 °C)    Recent Labs     23  0825 23  1142 23  1455   POCGLU 181* 89 70* 133*       Physical Exam  Vitals reviewed. Constitutional:       General: He is not in acute distress. Appearance: He is not diaphoretic. HENT:      Head: Normocephalic. Nose: Nose normal.   Eyes:      General: No scleral icterus. Conjunctiva/sclera: Conjunctivae normal.   Neck:      Trachea: No tracheal deviation. Cardiovascular:      Rate and Rhythm: Normal rate and regular rhythm. Pulmonary:      Effort: Pulmonary effort is normal. No respiratory distress. Breath sounds: Normal breath sounds. No wheezing or rales. Chest:      Chest wall: No tenderness. Abdominal:      General: There is no distension. Palpations: Abdomen is soft. Tenderness: There is no abdominal tenderness. Genitourinary:     Comments: Mason catheter collecting pink urine  Musculoskeletal:         General: No tenderness. Cervical back: Neck supple. Skin:     General: Skin is warm and dry. Coloration: Skin is pale. Neurological:      Mental Status: He is alert and oriented to person, place, and time. Medications:      Allergies:  No Known Allergies    Current Meds:   Scheduled Meds:    amLODIPine  5 mg Oral Daily    atorvastatin  80 mg Oral Daily    finasteride  5 mg Oral Daily    glipiZIDE  5 mg Oral QAM AC    hydrALAZINE  50 mg Oral BID    [Held by provider] lisinopril  20 mg Oral BID    [Held by provider] metFORMIN  1,000 mg Oral Daily with breakfast    vitamin B-12  1,000 mcg Oral Daily    tamsulosin  0.4 mg Oral Daily    [Held by provider] potassium chloride  10 mEq Oral Daily    sodium chloride flush  5-40 mL IntraVENous 2 times per day    metroNIDAZOLE  500 mg Oral 3 times per day cephALEXin  500 mg Oral 3 times per day    insulin lispro  0-8 Units SubCUTAneous TID     insulin lispro  0-4 Units SubCUTAneous Nightly     Continuous Infusions:    sodium chloride      dextrose      dextrose      sodium chloride 75 mL/hr at 02/20/23 0435     PRN Meds: sodium chloride flush, sodium chloride, potassium chloride **OR** potassium alternative oral replacement **OR** potassium chloride, magnesium sulfate, ondansetron **OR** ondansetron, polyethylene glycol, acetaminophen **OR** acetaminophen, morphine, opium-belladonna, glucose, dextrose bolus **OR** dextrose bolus, glucagon (rDNA), dextrose, glucose, dextrose bolus **OR** dextrose bolus, glucagon (rDNA), dextrose    Data:     I/O (24Hr): Intake/Output Summary (Last 24 hours) at 2/20/2023 1502  Last data filed at 2/20/2023 0600  Gross per 24 hour   Intake 123.85 ml   Output 1925 ml   Net -1801.15 ml       Labs:  Hematology:  Recent Labs     02/19/23  0946 02/20/23  0255   WBC 19.8* 16.7*   RBC 3.05* 2.60*   HGB 8.9* 7.6*   HCT 29.4* 26.4*   MCV 96.4 101.5   MCH 29.2 29.2   MCHC 30.3 28.8   RDW 13.5 13.8    272   MPV 10.5 10.6   INR  --  3.9     Chemistry:  Recent Labs     02/19/23  0946 02/19/23  1532 02/20/23  0255    138 138   K 5.5* 5.0 4.8    105 105   CO2 19* 18* 20   GLUCOSE 202* 191* 113*   BUN 31* 32* 36*   CREATININE 2.66* 3.60* 4.28*   ANIONGAP 17 15 13   LABGLOM 24* 16* 13*   CALCIUM 8.8 8.2* 8.0*     Recent Labs     02/19/23  1649 02/19/23 2002 02/20/23  0825 02/20/23  1142 02/20/23  1455   POCGLU 162* 181* 89 70* 133*     ABG:No results found for: POCPH, PHART, PH, POCPCO2, XAG7DKW, PCO2, POCPO2, PO2ART, PO2, POCHCO3, WNI1WVD, HCO3, NBEA, PBEA, BEART, BE, THGBART, THB, GON6YJG, ECSR2PWH, U8UVCUEX, O2SAT, FIO2  No results found for: SPECIAL  No results found for: CULTURE    Radiology:  No results found.     Assessment:        Primary Problem  Clot retention of urine    Active Hospital Problems    Diagnosis Date Noted Clot retention of urine [R33.8] 02/19/2023     Priority: Medium    Gross hematuria [R31.0] 02/19/2023     Priority: Medium    On continuous oral anticoagulation [Z79.01] 02/19/2023     Priority: Medium    Colitis [K52.9] 02/19/2023     Priority: Medium    Bladder carcinoma ([facility] [C67.9] 02/19/2023     Priority: Medium    MATTI (acute kidney injury) ([facility] [N17.9] 02/19/2023     Priority: Medium    Hyperkalemia [E87.5] 02/19/2023     Priority: Medium    Atrial fibrillation ([facility] [I48.91] 07/01/2022     Priority: Medium    Type 2 diabetes mellitus with diabetic polyneuropathy ([facility] [E11.42]      Priority: Medium         Plan:        Urology evaluation  Nephrology evaluation  Check bun and creatinine  Avoid nephrotoxins  Transfuse as needed  Recheck H&H  Vitamin K x1  Monitor INR  Coumadin on hold  Correct electrolyte abnormalities  Glycemic contol - Monitor and control blood sugars  Gentle hydration  Orthostatic precautions  Support stockings  Midodrine as needed  Titrate antihypertensives as needed      IP CONSULT TO UROLOGY  IP CONSULT TO HOSPITALIST  IP CONSULT TO UROLOGY  IP CONSULT TO DO Manuela  2/20/2023  3:02 PM

## 2023-02-21 ENCOUNTER — APPOINTMENT (OUTPATIENT)
Dept: INTERVENTIONAL RADIOLOGY/VASCULAR | Age: 83
DRG: 665 | End: 2023-02-21
Payer: MEDICARE

## 2023-02-21 PROBLEM — N18.31 STAGE 3A CHRONIC KIDNEY DISEASE (HCC): Status: ACTIVE | Noted: 2023-02-21

## 2023-02-21 LAB
ABSOLUTE EOS #: 0.15 K/UL (ref 0–0.44)
ABSOLUTE IMMATURE GRANULOCYTE: 0.17 K/UL (ref 0–0.3)
ABSOLUTE LYMPH #: 1.59 K/UL (ref 1.1–3.7)
ABSOLUTE MONO #: 1.29 K/UL (ref 0.1–1.2)
ALBUMIN SERPL-MCNC: 2.9 G/DL (ref 3.5–5.2)
ALBUMIN/GLOBULIN RATIO: 0.9 (ref 1–2.5)
ALP SERPL-CCNC: 113 U/L (ref 40–129)
ALT SERPL-CCNC: 7 U/L (ref 5–41)
ANION GAP SERPL CALCULATED.3IONS-SCNC: 15 MMOL/L (ref 9–17)
AST SERPL-CCNC: 14 U/L
BASOPHILS # BLD: 0 % (ref 0–2)
BASOPHILS ABSOLUTE: 0.07 K/UL (ref 0–0.2)
BILIRUB SERPL-MCNC: 0.4 MG/DL (ref 0.3–1.2)
BUN SERPL-MCNC: 42 MG/DL (ref 8–23)
CALCIUM SERPL-MCNC: 7.8 MG/DL (ref 8.6–10.4)
CHLORIDE SERPL-SCNC: 104 MMOL/L (ref 98–107)
CO2 SERPL-SCNC: 19 MMOL/L (ref 20–31)
CREAT SERPL-MCNC: 6.03 MG/DL (ref 0.7–1.2)
EOSINOPHILS RELATIVE PERCENT: 1 % (ref 1–4)
GFR SERPL CREATININE-BSD FRML MDRD: 9 ML/MIN/1.73M2
GLUCOSE BLD-MCNC: 127 MG/DL (ref 75–110)
GLUCOSE BLD-MCNC: 139 MG/DL (ref 75–110)
GLUCOSE BLD-MCNC: 64 MG/DL (ref 75–110)
GLUCOSE BLD-MCNC: 76 MG/DL (ref 75–110)
GLUCOSE SERPL-MCNC: 75 MG/DL (ref 70–99)
HBV CORE AB SER QL: NONREACTIVE
HBV SURFACE AB SERPL IA-ACNC: <3.5 MIU/ML
HBV SURFACE AG SER QL: NONREACTIVE
HCT VFR BLD AUTO: 25.1 % (ref 40.7–50.3)
HCV AB SER QL: NONREACTIVE
HGB BLD-MCNC: 7.3 G/DL (ref 13–17)
IMMATURE GRANULOCYTES: 1 %
INR PPP: 2.8
LYMPHOCYTES # BLD: 10 % (ref 24–43)
MCH RBC QN AUTO: 29.9 PG (ref 25.2–33.5)
MCHC RBC AUTO-ENTMCNC: 29.1 G/DL (ref 28.4–34.8)
MCV RBC AUTO: 102.9 FL (ref 82.6–102.9)
MONOCYTES # BLD: 8 % (ref 3–12)
NRBC AUTOMATED: 0 PER 100 WBC
PARTIAL THROMBOPLASTIN TIME: 37.2 SEC (ref 20.5–30.5)
PDW BLD-RTO: 13.9 % (ref 11.8–14.4)
PHOSPHATE SERPL-MCNC: 6.1 MG/DL (ref 2.5–4.5)
PLATELET # BLD AUTO: 277 K/UL (ref 138–453)
PMV BLD AUTO: 10.5 FL (ref 8.1–13.5)
POTASSIUM SERPL-SCNC: 5 MMOL/L (ref 3.7–5.3)
PROT SERPL-MCNC: 6.2 G/DL (ref 6.4–8.3)
PROTHROMBIN TIME: 27.6 SEC (ref 9.1–12.3)
RBC # BLD: 2.44 M/UL (ref 4.21–5.77)
SEG NEUTROPHILS: 80 % (ref 36–65)
SEGMENTED NEUTROPHILS ABSOLUTE COUNT: 13.28 K/UL (ref 1.5–8.1)
SODIUM SERPL-SCNC: 138 MMOL/L (ref 135–144)
WBC # BLD AUTO: 16.6 K/UL (ref 3.5–11.3)

## 2023-02-21 PROCEDURE — 85730 THROMBOPLASTIN TIME PARTIAL: CPT

## 2023-02-21 PROCEDURE — C1769 GUIDE WIRE: HCPCS

## 2023-02-21 PROCEDURE — 86317 IMMUNOASSAY INFECTIOUS AGENT: CPT

## 2023-02-21 PROCEDURE — 6370000000 HC RX 637 (ALT 250 FOR IP): Performed by: NURSE PRACTITIONER

## 2023-02-21 PROCEDURE — 36556 INSERT NON-TUNNEL CV CATH: CPT

## 2023-02-21 PROCEDURE — 99232 SBSQ HOSP IP/OBS MODERATE 35: CPT | Performed by: STUDENT IN AN ORGANIZED HEALTH CARE EDUCATION/TRAINING PROGRAM

## 2023-02-21 PROCEDURE — 2709999900 HC NON-CHARGEABLE SUPPLY

## 2023-02-21 PROCEDURE — 6370000000 HC RX 637 (ALT 250 FOR IP): Performed by: INTERNAL MEDICINE

## 2023-02-21 PROCEDURE — 6370000000 HC RX 637 (ALT 250 FOR IP): Performed by: STUDENT IN AN ORGANIZED HEALTH CARE EDUCATION/TRAINING PROGRAM

## 2023-02-21 PROCEDURE — 6360000002 HC RX W HCPCS: Performed by: PHYSICIAN ASSISTANT

## 2023-02-21 PROCEDURE — 84100 ASSAY OF PHOSPHORUS: CPT

## 2023-02-21 PROCEDURE — 87340 HEPATITIS B SURFACE AG IA: CPT

## 2023-02-21 PROCEDURE — 99232 SBSQ HOSP IP/OBS MODERATE 35: CPT | Performed by: INTERNAL MEDICINE

## 2023-02-21 PROCEDURE — P9047 ALBUMIN (HUMAN), 25%, 50ML: HCPCS | Performed by: INTERNAL MEDICINE

## 2023-02-21 PROCEDURE — 90935 HEMODIALYSIS ONE EVALUATION: CPT

## 2023-02-21 PROCEDURE — C1894 INTRO/SHEATH, NON-LASER: HCPCS

## 2023-02-21 PROCEDURE — C1752 CATH,HEMODIALYSIS,SHORT-TERM: HCPCS

## 2023-02-21 PROCEDURE — 82947 ASSAY GLUCOSE BLOOD QUANT: CPT

## 2023-02-21 PROCEDURE — 80053 COMPREHEN METABOLIC PANEL: CPT

## 2023-02-21 PROCEDURE — 85025 COMPLETE CBC W/AUTO DIFF WBC: CPT

## 2023-02-21 PROCEDURE — 85610 PROTHROMBIN TIME: CPT

## 2023-02-21 PROCEDURE — 77001 FLUOROGUIDE FOR VEIN DEVICE: CPT

## 2023-02-21 PROCEDURE — 6360000002 HC RX W HCPCS: Performed by: INTERNAL MEDICINE

## 2023-02-21 PROCEDURE — 86704 HEP B CORE ANTIBODY TOTAL: CPT

## 2023-02-21 PROCEDURE — 86803 HEPATITIS C AB TEST: CPT

## 2023-02-21 PROCEDURE — 1200000000 HC SEMI PRIVATE

## 2023-02-21 PROCEDURE — 36415 COLL VENOUS BLD VENIPUNCTURE: CPT

## 2023-02-21 PROCEDURE — 76937 US GUIDE VASCULAR ACCESS: CPT

## 2023-02-21 RX ORDER — HEPARIN SODIUM 1000 [USP'U]/ML
INJECTION, SOLUTION INTRAVENOUS; SUBCUTANEOUS
Status: COMPLETED | OUTPATIENT
Start: 2023-02-21 | End: 2023-02-21

## 2023-02-21 RX ORDER — HEPARIN SODIUM 1000 [USP'U]/ML
1200 INJECTION, SOLUTION INTRAVENOUS; SUBCUTANEOUS PRN
Status: DISCONTINUED | OUTPATIENT
Start: 2023-02-21 | End: 2023-02-28 | Stop reason: HOSPADM

## 2023-02-21 RX ORDER — ALBUMIN (HUMAN) 12.5 G/50ML
25 SOLUTION INTRAVENOUS ONCE
Status: COMPLETED | OUTPATIENT
Start: 2023-02-21 | End: 2023-02-21

## 2023-02-21 RX ORDER — HEPARIN SODIUM 1000 [USP'U]/ML
1300 INJECTION, SOLUTION INTRAVENOUS; SUBCUTANEOUS PRN
Status: DISCONTINUED | OUTPATIENT
Start: 2023-02-21 | End: 2023-02-28 | Stop reason: HOSPADM

## 2023-02-21 RX ORDER — CEPHALEXIN 500 MG/1
500 CAPSULE ORAL EVERY 12 HOURS SCHEDULED
Status: DISCONTINUED | OUTPATIENT
Start: 2023-02-21 | End: 2023-02-28

## 2023-02-21 RX ADMIN — HEPARIN SODIUM 1300 UNITS: 1000 INJECTION INTRAVENOUS; SUBCUTANEOUS at 17:29

## 2023-02-21 RX ADMIN — HEPARIN SODIUM 1200 UNITS: 1000 INJECTION, SOLUTION INTRAVENOUS; SUBCUTANEOUS at 13:05

## 2023-02-21 RX ADMIN — METRONIDAZOLE 500 MG: 500 TABLET, FILM COATED ORAL at 14:34

## 2023-02-21 RX ADMIN — FINASTERIDE 5 MG: 5 TABLET, FILM COATED ORAL at 09:24

## 2023-02-21 RX ADMIN — ATORVASTATIN CALCIUM 80 MG: 80 TABLET, FILM COATED ORAL at 09:24

## 2023-02-21 RX ADMIN — ACETAMINOPHEN 650 MG: 325 TABLET ORAL at 06:27

## 2023-02-21 RX ADMIN — METRONIDAZOLE 500 MG: 500 TABLET, FILM COATED ORAL at 06:18

## 2023-02-21 RX ADMIN — EPOETIN ALFA-EPBX 8000 UNITS: 4000 INJECTION, SOLUTION INTRAVENOUS; SUBCUTANEOUS at 17:28

## 2023-02-21 RX ADMIN — ALBUMIN (HUMAN) 25 G: 0.25 INJECTION, SOLUTION INTRAVENOUS at 16:11

## 2023-02-21 RX ADMIN — CEPHALEXIN 500 MG: 500 CAPSULE ORAL at 21:06

## 2023-02-21 RX ADMIN — ACETAMINOPHEN 650 MG: 325 TABLET ORAL at 21:07

## 2023-02-21 RX ADMIN — CEPHALEXIN 500 MG: 500 CAPSULE ORAL at 06:18

## 2023-02-21 RX ADMIN — TAMSULOSIN HYDROCHLORIDE 0.4 MG: 0.4 CAPSULE ORAL at 09:24

## 2023-02-21 RX ADMIN — HEPARIN SODIUM 1300 UNITS: 1000 INJECTION, SOLUTION INTRAVENOUS; SUBCUTANEOUS at 13:05

## 2023-02-21 RX ADMIN — ATROPA BELLADONNA AND OPIUM 60 MG: 16.2; 6 SUPPOSITORY RECTAL at 10:42

## 2023-02-21 RX ADMIN — HEPARIN SODIUM 1200 UNITS: 1000 INJECTION INTRAVENOUS; SUBCUTANEOUS at 17:28

## 2023-02-21 RX ADMIN — Medication 1000 MCG: at 09:24

## 2023-02-21 RX ADMIN — METRONIDAZOLE 500 MG: 500 TABLET, FILM COATED ORAL at 21:06

## 2023-02-21 ASSESSMENT — PAIN SCALES - GENERAL
PAINLEVEL_OUTOF10: 3
PAINLEVEL_OUTOF10: 3
PAINLEVEL_OUTOF10: 8

## 2023-02-21 ASSESSMENT — PAIN DESCRIPTION - ORIENTATION: ORIENTATION: MID;LOWER

## 2023-02-21 ASSESSMENT — PAIN DESCRIPTION - DESCRIPTORS: DESCRIPTORS: SPASM

## 2023-02-21 ASSESSMENT — PAIN DESCRIPTION - LOCATION: LOCATION: ABDOMEN

## 2023-02-21 NOTE — PROGRESS NOTES
Physical Therapy        Physical Therapy Cancel Note      DATE: 2023    NAME: Sheela Goss  MRN: [id number]   : 1940      Patient not seen this date for Physical Therapy due to:    Hemodialysis:getting temp cath then going to dialysis per RN.  Ck       Electronically signed by Fantasma Brown PT on 2023 at 2:30 PM

## 2023-02-21 NOTE — PROGRESS NOTES
PHARMACY NOTE:    The electrolyte replacement protocol for potassium/magnesium has been discontinued per P&T guidelines because the patient has reduced renal function (CrCl < 30 mL/min). The patient's most recent potassium & magnesium levels are:  Recent Labs     02/19/23  1532 02/20/23  0255 02/21/23  0543   K 5.0 4.8 5.0     Estimated Creatinine Clearance: 14 mL/min (A) (based on SCr of 6.03 mg/dL [facility])). For patients with decreased renal function (below 30ml/min) needing potassium/magnesium supplementation, please order individual bolus doses with appropriate monitoring. Please contact the inpatient pharmacy with any concerns. Thank you.

## 2023-02-21 NOTE — PROGRESS NOTES
NEPHROLOGY PROGRESS NOTE      ASSESSMENT     #1 acute kidney injury secondary to obstructive uropathy related to bladder clots and complicated further by hypotension/concomitant diuretic and ACE inhibitor use -evolving  Possibility of AIN in view of recent use of antibiotics not excluded completely  #2 metabolic acidosis  #3 chronic kidney disease stage III with baseline creatinine 1.4-1.6. Never seen a nephrologist in the past  #4 hospitalized for evaluation of hematuria with background history of bladder tumor status post TURBT in the past.  Awaiting cystoscopy. Also recently had UroLift procedure late January  #5 type 2 diabetes  #6 history of BPH  #7 anemia    PLAN     #1 discussed with him in details in regards to acute kidney injury which has been worsening. Option of dialysis discussed. The procedure and all the complications including cardiac events explained. He verbalized understanding willing to proceed. #2 Will initiate hemodialysis today using temporary catheter. Orders reviewed. SUBJECTIVE   Continues to have hematuria. Currently on continuous bladder irrigation. Plans for urology intervention tomorrow. Blood pressures running on the lower side. Renal function continues to decline  Urine output uncertain about because of CBI  Patient denies chest pain shortness of breath.     OBJECTIVE     Vitals:    02/21/23 0230 02/21/23 0430 02/21/23 0735 02/21/23 1118   BP: (!) 91/59 (!) 71/44 (!) 109/54 (!) 76/40   Pulse: 65 65 63 61   Resp:   16 18   Temp:   97.6 °F (36.4 °C) 97.6 °F (36.4 °C)   TempSrc:   Oral Axillary   SpO2:   90% 98%   Weight:       Height:         24HR INTAKE/OUTPUT:    Intake/Output Summary (Last 24 hours) at 2/21/2023 1125  Last data filed at 2/20/2023 2115  Gross per 24 hour   Intake --   Output 375 ml   Net -375 ml       General appearance:Awake, alert, in no acute distress  HEENT: PERRLA  Respiratory::vesicular breath sounds,no wheeze/crackles  Cardiovascular:S1 S2 normal,no gallop or organic murmur. Abdomen:Non tender/non distended. Bowel sounds present  Extremities: No Cyanosis or Clubbing, present lower extremity edema  Neurological:Alert and oriented. No abnormalities of mood, affect, memory, mentation, or behavior are noted      MEDICATIONS     Scheduled Meds:    cephALEXin  500 mg Oral 2 times per day    epoetin pedro-epbx  8,000 Units IntraVENous Once per day on Mon Wed Fri    amLODIPine  5 mg Oral Daily    atorvastatin  80 mg Oral Daily    finasteride  5 mg Oral Daily    [Held by provider] glipiZIDE  5 mg Oral QAM AC    hydrALAZINE  50 mg Oral BID    [Held by provider] lisinopril  20 mg Oral BID    [Held by provider] metFORMIN  1,000 mg Oral Daily with breakfast    vitamin B-12  1,000 mcg Oral Daily    tamsulosin  0.4 mg Oral Daily    [Held by provider] potassium chloride  10 mEq Oral Daily    sodium chloride flush  5-40 mL IntraVENous 2 times per day    metroNIDAZOLE  500 mg Oral 3 times per day    insulin lispro  0-8 Units SubCUTAneous TID WC    insulin lispro  0-4 Units SubCUTAneous Nightly     Continuous Infusions:    sodium chloride      dextrose      sodium chloride 75 mL/hr at 02/20/23 0435     PRN Meds:  midodrine, bisacodyl, sodium chloride flush, sodium chloride, ondansetron **OR** ondansetron, polyethylene glycol, acetaminophen **OR** acetaminophen, morphine, opium-belladonna, dextrose bolus **OR** dextrose bolus, glucagon (rDNA), dextrose, glucose  Home Meds:                Medications Prior to Admission: cephALEXin (KEFLEX) 500 MG capsule, Take 500 mg by mouth 3 times daily  metroNIDAZOLE (FLAGYL) 500 MG tablet, Take 500 mg by mouth 3 times daily  bisacodyl (DULCOLAX) 5 MG EC tablet, Take 5 mg by mouth daily as needed for Constipation  warfarin (COUMADIN) 5 MG tablet, Take 5 mg by mouth daily Follows coumadin clinic  amLODIPine (NORVASC) 5 MG tablet, Take 5 mg by mouth daily  atorvastatin (LIPITOR) 80 MG tablet, Take 80 mg by mouth daily  finasteride (PROSCAR) 5 MG tablet, Take 5 mg by mouth daily  furosemide (LASIX) 40 MG tablet, Take 40 mg by mouth daily  glimepiride (AMARYL) 2 MG tablet, Take 2 mg by mouth  hydrALAZINE (APRESOLINE) 50 MG tablet, Take 50 mg by mouth 2 times daily  lisinopril (PRINIVIL;ZESTRIL) 20 MG tablet, Take 20 mg by mouth 2 times daily   metFORMIN (GLUCOPHAGE) 1000 MG tablet, Take 1,000 mg by mouth  potassium chloride (KLOR-CON M) 10 MEQ extended release tablet, TAKE 1 TABLET (10 MEQ TOTAL) BY MOUTH DAILY WITH FOOD  vitamin B-12 (CYANOCOBALAMIN) 1000 MCG tablet, Take 1,000 mcg by mouth daily  tamsulosin (FLOMAX) 0.4 MG capsule, take 1 capsule by mouth once daily    INVESTIGATIONS     Last 3 CMP:    Recent Labs     02/19/23  1532 02/20/23  0255 02/21/23  0543    138 138   K 5.0 4.8 5.0    105 104   CO2 18* 20 19*   BUN 32* 36* 42*   CREATININE 3.60* 4.28* 6.03*   CALCIUM 8.2* 8.0* 7.8*   PROT  --   --  6.2*   LABALBU  --   --  2.9*   BILITOT  --   --  0.4   ALKPHOS  --   --  113   AST  --   --  14   ALT  --   --  7       Last 3 CBC:  Recent Labs     02/19/23  0946 02/20/23  0255 02/20/23  1551 02/21/23  0543   WBC 19.8* 16.7*  --  16.6*   RBC 3.05* 2.60*  --  2.44*   HGB 8.9* 7.6* 7.7* 7.3*   HCT 29.4* 26.4* 26.7* 25.1*   MCV 96.4 101.5  --  102.9   MCH 29.2 29.2  --  29.9   MCHC 30.3 28.8  --  29.1   RDW 13.5 13.8  --  13.9    272  --  277   MPV 10.5 10.6  --  10.5       Please do not hesitate to call with questions    This note is created with the assistance of a speech-recognition program. While intending to generate a document that actually reflects the content of the visit, no guarantees can be provided that every mistake has been identified and corrected by editing    Dionicio Maddox MD MD, [facility] Mor[facility] Mendoza, [address]   2/21/2023 11:25 AM  [facility]

## 2023-02-21 NOTE — PROGRESS NOTES
Urology Progress Note      Subjective: Maria Luisa Camarena is a [de-identified] y.o. male. His/Her current Diet is: Diet NPO Exceptions are: Sips of Water with Meds. Since the previous note, the patient reports the following:  CBI clamped yesterday however urine turned dark bloody, patient was irrigated and placed on light traction, restarted on CBI  Urine pink this morning on low gtt CBI  No acute issues overnight. No fevers or chills. No nausea or vomiting. Pain Controlled. Vitals and Labs:  Vitals:    02/20/23 2240 02/20/23 2248 02/21/23 0230 02/21/23 0430   BP: (!) 78/36 81/60 (!) 91/59 (!) 71/44   Pulse: 66 65 65 65   Resp:       Temp: 98 °F (36.7 °C)      TempSrc: Temporal      SpO2: 97%      Weight:       Height:         I/O last 3 completed shifts: In: 443.9 [P.O.:320; I.V.:123.9]  Out: 2200 [Urine:2200]    Recent Labs     02/19/23  0946 02/20/23  0255 02/20/23  1551 02/21/23  0543   WBC 19.8* 16.7*  --  16.6*   HGB 8.9* 7.6* 7.7* 7.3*   HCT 29.4* 26.4* 26.7* 25.1*   MCV 96.4 101.5  --  102.9    272  --  277       Recent Labs     02/19/23  1532 02/20/23  0255 02/21/23  0543    138 138   K 5.0 4.8 5.0    105 104   CO2 18* 20 19*   PHOS  --   --  6.1*   BUN 32* 36* 42*   CREATININE 3.60* 4.28* 6.03*         No results for input(s): COLORU, PHUR, LABCAST, WBCUA, RBCUA, MUCUS, TRICHOMONAS, YEAST, BACTERIA, CLARITYU, SPECGRAV, LEUKOCYTESUR, UROBILINOGEN, BILIRUBINUR, BLOODU in the last 72 hours. Invalid input(s): NITRATE, GLUCOSEUKETONESUAMORPHOUS    Physical Exam:  NAD  A/O x 3  RRR  No accessory muscles of inspiration  Abdomen soft, non-tender, non-distended. No CVA tenderness.   Mason pink urine output on low moderate CBI    Impression:  [de-identified] yo male with  Clot retention  Atrial fibrillation on coumadin  Recent urolift implantation on 1/31/2023  History of bladder cancer s/p TURBT in 9/2022  Patient Active Problem List   Diagnosis    Atrial fibrillation (HCC)    Benign prostatic hyperplasia with urinary frequency    Cardiomyopathy ([facility] [address]    Coronary atherosclerosis    Gout    Obesity    PVD (peripheral vascular disease) ([facility] [address]    Right ventricular dilation    Tricuspid regurgitation    Type 2 diabetes mellitus with diabetic polyneuropathy (HCC)    Malignant neoplasm of overlapping sites of bladder (HCC)    Clot retention of urine    Gross hematuria    On continuous oral anticoagulation    Colitis    Bladder carcinoma (HCC)    MATTI (acute kidney injury) (HCC)    Hyperkalemia    Supratherapeutic INR    Orthostatic hypotension    Acute blood loss anemia: Due to hematuria/bladder cancer    Urinary retention       Plan:  Clamp CBI, monitor urine color, can have regular diet if color remains clear in 2 hours    Void trial later today if passes clamp trial    If urine remains gorodn bloody after clamp trial, may need to go to OR for cystoscopy, clot evacuation and fulguration today     Hold anticoagulation if OK per primary team     Monitor Hemoglobin, transfuse as needed for Hgb < 7    Monitor creatinine      Alexandra Car MD  6:54 AM 2/21/2023

## 2023-02-21 NOTE — PROGRESS NOTES
Patient had CBI clamped this am as urine was clear on slow drip. He unfortunately darkened up again around 10:15am. RN was able to irrigate a few clots out, but patient continued to have bladder spasms. I further irrigated with some small clots aspirated, urine continued to be pink in color. Patient spasming with leakage around casey, but he irrigated easily. CBI hooked up again on moderate-fast drip. Will plan for OR tomorrow or Thursday for cystoscopy, clot evacuation. Continue CBI to keep urine clear/draining well. PRN hand irrigations. Diet changed to regular for today, NPO at midnight.    Alis Astorga PA-C  Urology Service   2/21/2023 11:17 AM

## 2023-02-21 NOTE — PROGRESS NOTES
Pharmacy Note     Renal Dose Adjustment    Risa Castellano is a [de-identified] y.o. male. Pharmacist assessment of renally cleared medications. Recent Labs     02/20/23  0255 02/21/23  0543   BUN 36* 42*       Recent Labs     02/20/23  0255 02/21/23  0543   CREATININE 4.28* 6.03*       Estimated Creatinine Clearance: 14 mL/min (A) (based on SCr of 6.03 mg/dL (HH)).   Estimated CrCl using Ideal Body Weight: 14 mL/min (based on IBW  kg)    Height:   Ht Readings from Last 1 Encounters:   02/20/23 5' 11\" (1.803 m)     Weight:  Wt Readings from Last 1 Encounters:   02/19/23 290 lb 2 oz (131.6 kg)       The following medication dose has been adjusted based upon renal function per P&T Guidelines:             Keflex dose reduced to 500 mg BID from TID  for CrCl < 15 ml/min per protocol    Thank you  Jhonny Adams, PharmD, [facility]  Inpatient Clinical Pharmacist  [phone]

## 2023-02-21 NOTE — PROGRESS NOTES
[facility]  Office: [address], DO, Machelle Recinos, DO, Tonya Mosley, DO, Lindsey Miranda Blood, DO, Madonna Fraire MD, Melissa Palma MD, Laura Hollingsworth MD, Bradford Sebastian MD,  Andrea Roman MD, Nemesio Verdugo MD, Jillian Randolph, DO, Lorena Kaiser MD,  Jeannine Delgado MD, Driss Fregoso MD, Concetta Hooker, DO, Prema Chiu MD, Nayeli Landeros MD, Erick Mack, DO, Alpa Mckeon MD, Aaliyah Desai MD, Jennifer Serrano MD, Davion Johnson MD, Raysa Bone, DO, Kamille Metcalf MD, Keli Mai MD, Sivakumar Foster, CNP,  Julio Martinez, CNP, Zeny Martinez, CNP, Brooklyn Martin, CNP,  Sara Roof, [facility], Medardo Rush, CNP, Genevieve Farfan CNP, Jim Butts, CNP, Rena Can, CNP, Surya Philip, CNP, Darryn Duarte PA-C, Grisel Odom, CNS, Aicha Rosario, CNP, Gordy Perez, [facility]    Progress Note    2/21/2023    8:17 AM    Name:   Gabe Quiroz  MRN:     [id number]     Acct:      [de-identified]   Room:   [address] [place] [address] Day:  2  Admit Date:  2/19/2023 12:32 AM    PCP:   Ochoa Felipe  Code Status:  Full Code    Subjective:     C/C:   Chief Complaint   Patient presents with    Hematuria     Transfer from [facility] d/t hematuria. Pt with history of cystoscopy with bladder bioposy and fulguration, urolift x 4  per Dr. Monique Collier on 01/31/023. Pt with urinary retention, dribbling and passing blood clots today. Pt with history of atrial fibrillation, currently on Coumadin. INR-3.71 today. Pt reports he has held his coumadin for the past three days. Pt arrives with casey catheter in place with scant amount of bright red blood. Interval History Status:     CBI has been turned off, plan is to monitor if any hematuria in Casey bag and possibly proceed to cystoscopy later today    Brief History:     62-year-old male transferred from [facility] secondary to hematuria.   He does have a history of recent cystoscopy with bladder biopsy and fulguration, UroLift x4 with Dr. Molly Mcclendon. This was on 2023. He is on Coumadin for atrial fibrillation with a supratherapeutic INR on admission. Currently is being held his most recent INR is 2.8. His chief complaint on arrival was difficulty in urination and noticing blood in his urine    Past Medical History:   has a past medical history of Atrial fibrillation ([facility] [address] Benign prostatic hyperplasia with urinary frequency, Cancer ([facility] [address] Cardiomyopathy ([facility] [address] CKD (chronic kidney disease), Coronary atherosclerosis, Gout, Morbid obesity with body mass index (BMI) of 45.0 to 49.9 in [facility]), Obesity, Pulmonary hypertension ([facility] [address] PVD (peripheral vascular disease) ([facility] [address] Right ventricular dilation, Tricuspid regurgitation, and Type 2 diabetes mellitus with diabetic polyneuropathy ([facility] [address] Social History:   reports that he has never smoked. He has never used smokeless tobacco. He reports that he does not currently use alcohol. He reports that he does not use drugs. Family History:   Family History   Problem Relation Age of Onset    Heart Disease Mother     Breast Cancer Sister     Cancer Brother         prostate       Review of Systems:     12 point ROS negative other than what is noted in brief history  He does have some difficulty with urination and is feeling ill in general  Has also noted blood in his urine    Physical Examination:        Vitals  BP (!) 109/54   Pulse 63   Temp 97.6 °F (36.4 °C) (Oral)   Resp 16   Ht 5' 11\" (1.803 m)   Wt 290 lb 2 oz (131.6 kg)   SpO2 90%   BMI 40.46 kg/m²   Temp (24hrs), Av.9 °F (36.6 °C), Min:97.5 °F (36.4 °C), Max:98.4 °F (36.9 °C)    Recent Labs     23  1640 23  0749   POCGLU 138* 176* 175* 76         Physical Exam  Vitals reviewed. Constitutional:       Appearance: Normal appearance. He is ill-appearing. HENT:      Head: Normocephalic.    Eyes:      Extraocular Movements: Extraocular movements intact. Conjunctiva/sclera: Conjunctivae normal.   Cardiovascular:      Rate and Rhythm: Normal rate and regular rhythm. Pulses: Normal pulses. Heart sounds: Normal heart sounds. Pulmonary:      Effort: Pulmonary effort is normal.      Breath sounds: Normal breath sounds. Abdominal:      Comments: Slightly distended abdomen   Neurological:      General: No focal deficit present. Mental Status: He is alert. Psychiatric:         Mood and Affect: Mood normal.         Behavior: Behavior normal.         Thought Content: Thought content normal.         Judgment: Judgment normal.         Medications:      Allergies:  No Known Allergies    Current Meds:   Scheduled Meds:    amLODIPine  5 mg Oral Daily    atorvastatin  80 mg Oral Daily    finasteride  5 mg Oral Daily    [Held by provider] glipiZIDE  5 mg Oral QAM AC    hydrALAZINE  50 mg Oral BID    [Held by provider] lisinopril  20 mg Oral BID    [Held by provider] metFORMIN  1,000 mg Oral Daily with breakfast    vitamin B-12  1,000 mcg Oral Daily    tamsulosin  0.4 mg Oral Daily    [Held by provider] potassium chloride  10 mEq Oral Daily    sodium chloride flush  5-40 mL IntraVENous 2 times per day    metroNIDAZOLE  500 mg Oral 3 times per day    cephALEXin  500 mg Oral 3 times per day    insulin lispro  0-8 Units SubCUTAneous TID WC    insulin lispro  0-4 Units SubCUTAneous Nightly     Continuous Infusions:    sodium chloride      dextrose      dextrose      sodium chloride 75 mL/hr at 02/20/23 0435     PRN Meds: midodrine, bisacodyl, sodium chloride flush, sodium chloride, potassium chloride **OR** potassium alternative oral replacement **OR** potassium chloride, magnesium sulfate, ondansetron **OR** ondansetron, polyethylene glycol, acetaminophen **OR** acetaminophen, morphine, opium-belladonna, glucose, dextrose bolus **OR** dextrose bolus, glucagon (rDNA), dextrose, glucose, dextrose bolus **OR** dextrose bolus, glucagon (rDNA), dextrose    Data:     I/O (24Hr): Intake/Output Summary (Last 24 hours) at 2/21/2023 0817  Last data filed at 2/20/2023 2115  Gross per 24 hour   Intake --   Output 375 ml   Net -375 ml         Labs:  Hematology:  Recent Labs     02/19/23  0946 02/20/23  0255 02/20/23  1551 02/21/23  0543   WBC 19.8* 16.7*  --  16.6*   RBC 3.05* 2.60*  --  2.44*   HGB 8.9* 7.6* 7.7* 7.3*   HCT 29.4* 26.4* 26.7* 25.1*   MCV 96.4 101.5  --  102.9   MCH 29.2 29.2  --  29.9   MCHC 30.3 28.8  --  29.1   RDW 13.5 13.8  --  13.9    272  --  277   MPV 10.5 10.6  --  10.5   INR  --  3.9  --  2.8       Chemistry:  Recent Labs     02/19/23  1532 02/20/23  0255 02/21/23  0543    138 138   K 5.0 4.8 5.0    105 104   CO2 18* 20 19*   GLUCOSE 191* 113* 75   BUN 32* 36* 42*   CREATININE 3.60* 4.28* 6.03*   ANIONGAP 15 13 15   LABGLOM 16* 13* 9*   CALCIUM 8.2* 8.0* 7.8*   PHOS  --   --  6.1*       Recent Labs     02/20/23  1142 02/20/23  1455 02/20/23  1640 02/20/23 2025 02/20/23  2035 02/21/23  0543 02/21/23  0749   PROT  --   --   --   --   --  6.2*  --    LABALBU  --   --   --   --   --  2.9*  --    AST  --   --   --   --   --  14  --    ALT  --   --   --   --   --  7  --    ALKPHOS  --   --   --   --   --  113  --    BILITOT  --   --   --   --   --  0.4  --    POCGLU 70* 133* 138* 176* 175*  --  76       ABG:No results found for: POCPH, PHART, PH, POCPCO2, DWA6IBM, PCO2, POCPO2, PO2ART, PO2, POCHCO3, DKK0MFH, HCO3, NBEA, PBEA, BEART, BE, THGBART, THB, OGA3YAB, HDZV5FCF, B6GBBFUH, O2SAT, FIO2  No results found for: SPECIAL  No results found for: CULTURE    Radiology:  No results found.     Assessment:        Primary Problem  Clot retention of urine    Active Hospital Problems    Diagnosis Date Noted    Supratherapeutic INR [R79.1] 02/20/2023     Priority: Medium    Orthostatic hypotension [I95.1] 02/20/2023     Priority: Medium    Acute blood loss anemia: Due to hematuria/bladder cancer [D62] 02/20/2023     Priority: Medium    Urinary retention [R33.9] 02/20/2023     Priority: Medium    Clot retention of urine [R33.8] 02/19/2023     Priority: Medium    Gross hematuria [R31.0] 02/19/2023     Priority: Medium    On continuous oral anticoagulation [Z79.01] 02/19/2023     Priority: Medium    Colitis [K52.9] 02/19/2023     Priority: Medium    Bladder carcinoma ([facility] [address] [C67.9] 02/19/2023     Priority: Medium    MATTI (acute kidney injury) ([facility] [address] [N17.9] 02/19/2023     Priority: Medium    Hyperkalemia [E87.5] 02/19/2023     Priority: Medium    Atrial fibrillation ([address] [I48.91] 07/01/2022     Priority: Medium    Type 2 diabetes mellitus with diabetic polyneuropathy ([address] [E11.42]      Priority: Medium         Plan:        Clamp trial no more irrigation at this time  Monitor for any more signs of bleeding  Continue to hold anticoagulation  Further recommendations from urology to follow regarding surgery or not  If no surgery planned patient can be discharged next 24 to 48 hours      IP CONSULT TO UROLOGY  IP CONSULT TO HOSPITALIST  IP CONSULT TO UROLOGY  IP CONSULT TO NEPHROLOGY    Fortunato Pratt MD  2/21/2023  8:17 AM

## 2023-02-21 NOTE — PROGRESS NOTES
Dialysis Post Treatment Note  Vitals:    02/21/23 1741   BP:    Pulse:    Resp: 17   Temp: 96.8 °F (36 °C)   SpO2:    /46  Pulse 61    Pre-Weight = 141 kg   Post-weight = Weight: (!) 309 lb 1.4 oz (140.2 kg)  Total Liters Processed =  24.3   Rinseback Volume (mL) = 300   Net Removal (mL) =  500  Patient's dry weight= tbd  Type of access used= rt tunnel cath  Length of treatment=2 hrs    Albumin given for hypotension. Pt had no complaints. 2nd treatment tomorrow.

## 2023-02-21 NOTE — CARE COORDINATION
SW consulted for new HD referral.   Patient with MATTI, 1st HD treatment today. Met with patient who confirmed he lives independently in [place]. Wife passed away  recently. Patient's family is supportive and he also attends [facility] center near his home. Patient would like referral to [place] [address] as that is closest to his home. Does not currently follow a Nephrologist. He does drive independently. Currently undergoing treatments for bladder cancer. Does not have a schedule preference. CHANO will fax referral to [facility] under [facility].

## 2023-02-21 NOTE — BRIEF OP NOTE
Brief Postoperative Note Non Tunneled HD Catheter    Felisa Yohannes  YOB: 1940  [id number]    Pre-operative Diagnosis: Acute Renal Failure      Post-operative Diagnosis: Same    Procedure: Non tunneled Dialysis Catheter    Anesthesia: 1%Lidocaine     Surgeons/Assistants: Debbie Betancourt PA-C    Estimated Blood Loss: Minimal    Complications: none    13 Fr x 15 cm non tunneled HD Catheter placed via Site:  Right Internal Jugular Vein. Sutured in place and sterile dressing applied. Locked with Heparin  May use HD cath for dialysis.     Electronically signed by LIBIA Shukla on 2/21/2023 at 1:34 PM

## 2023-02-21 NOTE — PROGRESS NOTES
Dialysis Time Out  To be done by RN and tech or 2 RNs  Staff Names Kash LANGE RN, Rose Marie DE LEON RN    [x]  Identity of the patient using 2 patient identifiers  [x]  Consent for treatment  [x]  Equipment-proper machine and dialyzer  [x]  B-Hep B status  [x]  Orders- to include bath, blood flow, dialyzer, time and fluid removal  [x]  Access-Correct site and in working order  [x]  Time for patient to ask questions.

## 2023-02-22 PROBLEM — Z87.448 HX OF CHRONIC KIDNEY DISEASE: Status: ACTIVE | Noted: 2023-02-22

## 2023-02-22 PROBLEM — Z86.39 HX OF TYPE 2 DIABETES MELLITUS: Status: ACTIVE | Noted: 2023-02-22

## 2023-02-22 LAB
ANION GAP SERPL CALCULATED.3IONS-SCNC: 14 MMOL/L (ref 9–17)
BUN SERPL-MCNC: 38 MG/DL (ref 8–23)
CALCIUM SERPL-MCNC: 7.8 MG/DL (ref 8.6–10.4)
CHLORIDE SERPL-SCNC: 103 MMOL/L (ref 98–107)
CO2 SERPL-SCNC: 20 MMOL/L (ref 20–31)
CREAT SERPL-MCNC: 5.7 MG/DL (ref 0.7–1.2)
GFR SERPL CREATININE-BSD FRML MDRD: 9 ML/MIN/1.73M2
GLUCOSE BLD-MCNC: 127 MG/DL (ref 75–110)
GLUCOSE BLD-MCNC: 127 MG/DL (ref 75–110)
GLUCOSE BLD-MCNC: 73 MG/DL (ref 75–110)
GLUCOSE SERPL-MCNC: 69 MG/DL (ref 70–99)
HCT VFR BLD AUTO: 25.4 % (ref 40.7–50.3)
HGB BLD-MCNC: 8 G/DL (ref 13–17)
INR PPP: 1.9
PATHOLOGIST: NORMAL
POTASSIUM SERPL-SCNC: 4.9 MMOL/L (ref 3.7–5.3)
PROTHROMBIN TIME: 19.7 SEC (ref 9.1–12.3)
REASON FOR REJECTION: NORMAL
SERUM IFX INTERP: NORMAL
SODIUM SERPL-SCNC: 137 MMOL/L (ref 135–144)
ZZ NTE CLEAN UP: ORDERED TEST: NORMAL
ZZ NTE WITH NAME CLEAN UP: SPECIMEN SOURCE: NORMAL

## 2023-02-22 PROCEDURE — 6370000000 HC RX 637 (ALT 250 FOR IP): Performed by: STUDENT IN AN ORGANIZED HEALTH CARE EDUCATION/TRAINING PROGRAM

## 2023-02-22 PROCEDURE — 86900 BLOOD TYPING SEROLOGIC ABO: CPT

## 2023-02-22 PROCEDURE — 99232 SBSQ HOSP IP/OBS MODERATE 35: CPT | Performed by: STUDENT IN AN ORGANIZED HEALTH CARE EDUCATION/TRAINING PROGRAM

## 2023-02-22 PROCEDURE — 85610 PROTHROMBIN TIME: CPT

## 2023-02-22 PROCEDURE — P9016 RBC LEUKOCYTES REDUCED: HCPCS

## 2023-02-22 PROCEDURE — 6370000000 HC RX 637 (ALT 250 FOR IP): Performed by: INTERNAL MEDICINE

## 2023-02-22 PROCEDURE — 85014 HEMATOCRIT: CPT

## 2023-02-22 PROCEDURE — 82947 ASSAY GLUCOSE BLOOD QUANT: CPT

## 2023-02-22 PROCEDURE — 86850 RBC ANTIBODY SCREEN: CPT

## 2023-02-22 PROCEDURE — 6360000002 HC RX W HCPCS: Performed by: INTERNAL MEDICINE

## 2023-02-22 PROCEDURE — 36415 COLL VENOUS BLD VENIPUNCTURE: CPT

## 2023-02-22 PROCEDURE — 85018 HEMOGLOBIN: CPT

## 2023-02-22 PROCEDURE — 6370000000 HC RX 637 (ALT 250 FOR IP): Performed by: NURSE PRACTITIONER

## 2023-02-22 PROCEDURE — 86920 COMPATIBILITY TEST SPIN: CPT

## 2023-02-22 PROCEDURE — 80048 BASIC METABOLIC PNL TOTAL CA: CPT

## 2023-02-22 PROCEDURE — 97530 THERAPEUTIC ACTIVITIES: CPT

## 2023-02-22 PROCEDURE — 2580000003 HC RX 258: Performed by: STUDENT IN AN ORGANIZED HEALTH CARE EDUCATION/TRAINING PROGRAM

## 2023-02-22 PROCEDURE — 51702 INSERT TEMP BLADDER CATH: CPT

## 2023-02-22 PROCEDURE — 1200000000 HC SEMI PRIVATE

## 2023-02-22 PROCEDURE — 5A1D70Z PERFORMANCE OF URINARY FILTRATION, INTERMITTENT, LESS THAN 6 HOURS PER DAY: ICD-10-PCS | Performed by: INTERNAL MEDICINE

## 2023-02-22 PROCEDURE — 90935 HEMODIALYSIS ONE EVALUATION: CPT | Performed by: INTERNAL MEDICINE

## 2023-02-22 PROCEDURE — 90935 HEMODIALYSIS ONE EVALUATION: CPT

## 2023-02-22 PROCEDURE — 97535 SELF CARE MNGMENT TRAINING: CPT

## 2023-02-22 PROCEDURE — 86901 BLOOD TYPING SEROLOGIC RH(D): CPT

## 2023-02-22 RX ORDER — SODIUM CHLORIDE 9 MG/ML
INJECTION, SOLUTION INTRAVENOUS PRN
Status: DISCONTINUED | OUTPATIENT
Start: 2023-02-22 | End: 2023-02-28 | Stop reason: HOSPADM

## 2023-02-22 RX ORDER — ALBUMIN (HUMAN) 12.5 G/50ML
25 SOLUTION INTRAVENOUS PRN
Status: DISCONTINUED | OUTPATIENT
Start: 2023-02-22 | End: 2023-02-28 | Stop reason: HOSPADM

## 2023-02-22 RX ORDER — 0.9 % SODIUM CHLORIDE 0.9 %
500 INTRAVENOUS SOLUTION INTRAVENOUS ONCE
Status: COMPLETED | OUTPATIENT
Start: 2023-02-22 | End: 2023-02-22

## 2023-02-22 RX ORDER — MIDODRINE HYDROCHLORIDE 5 MG/1
7.5 TABLET ORAL ONCE
Status: COMPLETED | OUTPATIENT
Start: 2023-02-22 | End: 2023-02-22

## 2023-02-22 RX ADMIN — Medication 1000 MCG: at 09:00

## 2023-02-22 RX ADMIN — METRONIDAZOLE 500 MG: 500 TABLET, FILM COATED ORAL at 16:59

## 2023-02-22 RX ADMIN — ACETAMINOPHEN 650 MG: 325 TABLET ORAL at 16:56

## 2023-02-22 RX ADMIN — HEPARIN SODIUM 1200 UNITS: 1000 INJECTION INTRAVENOUS; SUBCUTANEOUS at 15:10

## 2023-02-22 RX ADMIN — MIDODRINE HYDROCHLORIDE 7.5 MG: 5 TABLET ORAL at 12:17

## 2023-02-22 RX ADMIN — MIDODRINE HYDROCHLORIDE 2.5 MG: 2.5 TABLET ORAL at 10:39

## 2023-02-22 RX ADMIN — HEPARIN SODIUM 1300 UNITS: 1000 INJECTION INTRAVENOUS; SUBCUTANEOUS at 15:10

## 2023-02-22 RX ADMIN — ATORVASTATIN CALCIUM 80 MG: 80 TABLET, FILM COATED ORAL at 09:00

## 2023-02-22 RX ADMIN — FINASTERIDE 5 MG: 5 TABLET, FILM COATED ORAL at 09:00

## 2023-02-22 RX ADMIN — CEPHALEXIN 500 MG: 500 CAPSULE ORAL at 19:57

## 2023-02-22 RX ADMIN — SODIUM CHLORIDE 500 ML: 9 INJECTION, SOLUTION INTRAVENOUS at 10:46

## 2023-02-22 RX ADMIN — CEPHALEXIN 500 MG: 500 CAPSULE ORAL at 09:00

## 2023-02-22 RX ADMIN — METRONIDAZOLE 500 MG: 500 TABLET, FILM COATED ORAL at 08:08

## 2023-02-22 RX ADMIN — TAMSULOSIN HYDROCHLORIDE 0.4 MG: 0.4 CAPSULE ORAL at 09:00

## 2023-02-22 RX ADMIN — ACETAMINOPHEN 650 MG: 325 TABLET ORAL at 09:00

## 2023-02-22 ASSESSMENT — PAIN DESCRIPTION - PAIN TYPE
TYPE: ACUTE PAIN
TYPE: ACUTE PAIN

## 2023-02-22 ASSESSMENT — PAIN DESCRIPTION - DESCRIPTORS
DESCRIPTORS: CRAMPING
DESCRIPTORS: CRAMPING
DESCRIPTORS: ACHING

## 2023-02-22 ASSESSMENT — PAIN DESCRIPTION - ONSET
ONSET: ON-GOING
ONSET: ON-GOING

## 2023-02-22 ASSESSMENT — PAIN DESCRIPTION - ORIENTATION
ORIENTATION: RIGHT;LEFT
ORIENTATION: RIGHT
ORIENTATION: RIGHT

## 2023-02-22 ASSESSMENT — PAIN SCALES - GENERAL
PAINLEVEL_OUTOF10: 2
PAINLEVEL_OUTOF10: 3
PAINLEVEL_OUTOF10: 2
PAINLEVEL_OUTOF10: 4
PAINLEVEL_OUTOF10: 3
PAINLEVEL_OUTOF10: 5
PAINLEVEL_OUTOF10: 1

## 2023-02-22 ASSESSMENT — PAIN DESCRIPTION - LOCATION
LOCATION: KNEE
LOCATION: PENIS
LOCATION: LEG
LOCATION: LEG

## 2023-02-22 ASSESSMENT — PAIN DESCRIPTION - FREQUENCY
FREQUENCY: INTERMITTENT
FREQUENCY: INTERMITTENT

## 2023-02-22 NOTE — PROGRESS NOTES
Urology Progress Note      Subjective: Sheela Goss is a [de-identified] y.o. male. His/Her current Diet is: Diet NPO Exceptions are: Sips of Water with Meds. Since the previous note, the patient reports the following:  CBI clamped again yesterday however urine turned bloody with clots and required irrigation, replaced on CBI  This AM urine was pink with clots on moderate gtt CBI  No acute issues overnight. No fevers or chills. No nausea or vomiting. Pain Controlled. Initiated on dialysis yesterday      Vitals and Labs:  Vitals:    02/21/23 1805 02/21/23 2030 02/22/23 0010 02/22/23 0013   BP: 135/66 (!) 143/61 (!) 85/48 (!) 95/43   Pulse: 71 65 53 53   Resp: 16 18     Temp: 97.7 °F (36.5 °C) 97.4 °F (36.3 °C)     TempSrc: Oral Oral     SpO2: 99% 100%     Weight:       Height:         I/O last 3 completed shifts: In: 300   Out: -[address]     Recent Labs     02/19/23  0946 02/20/23  0255 02/20/23  1551 02/21/23  0543   WBC 19.8* 16.7*  --  16.6*   HGB 8.9* 7.6* 7.7* 7.3*   HCT 29.4* 26.4* 26.7* 25.1*   MCV 96.4 101.5  --  102.9    272  --  277       Recent Labs     02/19/23  1532 02/20/23  0255 02/21/23  0543    138 138   K 5.0 4.8 5.0    105 104   CO2 18* 20 19*   PHOS  --   --  6.1*   BUN 32* 36* 42*   CREATININE 3.60* 4.28* 6.03*         No results for input(s): COLORU, PHUR, LABCAST, WBCUA, RBCUA, MUCUS, TRICHOMONAS, YEAST, BACTERIA, CLARITYU, SPECGRAV, LEUKOCYTESUR, UROBILINOGEN, BILIRUBINUR, BLOODU in the last 72 hours. Invalid input(s): NITRATE, GLUCOSEUKETONESUAMORPHOUS    Physical Exam:  NAD  A/O x 3  RRR  No accessory muscles of inspiration  Abdomen soft, non-tender, non-distended. No CVA tenderness.   Mason pink urine output on low moderate CBI    Impression:  [de-identified] yo male with  Clot retention  Atrial fibrillation on coumadin  Recent urolift implantation on 1/31/2023  History of bladder cancer s/p TURBT in 9/2022  Patient Active Problem List   Diagnosis    Atrial fibrillation (HCC)    Benign prostatic hyperplasia with urinary frequency    Card[facility]    Coronary atherosclerosis    Gout    Obesity    PVD (peripheral vascular disease) (HCC)    Right ventricular dilation    Tricuspid regurgitation    Type 2 diabetes mellitus with diabetic polyneuropathy (HCC)    Malignant neoplasm of overlapping sites of bladder (HCC)    Clot retention of urine    Gross hematuria    On continuous oral anticoagulation    Colitis    Bladder carcinoma (HCC)    MATTI (acute kidney injury) (HCC)    Hyperkalemia    Supratherapeutic INR    Orthostatic hypotension    Acute blood loss anemia: Due to hematuria/bladder cancer    Urinary retention    Stage 3a chronic kidney disease ([facility]       Plan:  NPO    OR for cystoscopy, clot evacuation and fulguration today     Hold anticoagulation if OK per primary team     Monitor Hemoglobin, transfuse as needed for Hgb < 7    Monitor creatinine, appreciate nephrology recommendations      Eric Meyer MD  7:24 AM 2/22/2023

## 2023-02-22 NOTE — PROGRESS NOTES
[facility]  Office: [address], DO, Turubina Arias, DO, Elsi Mario, DO, Madeline Jordan Blood, DO, Dominick Connor MD, Ирина Nazario MD, Davie Tadeo MD, Jo Ann Beckett MD,  Nithya Lopez MD, Pancho Shanks MD, Rannie Dubin, DO, Karis Faust MD,  Ivory Johnston MD, Faustino Rivera MD, Mohamud Anna DO, Kevin Welsh MD, Socorro Cooper MD, Rowena Ayala DO, Sandeep Chester MD, Amy Jin MD, Cathleen Mack MD, Nani Sanchez MD, Fernanda Patel, DO, Vallorie Mcardle, MD, Shaun Rubio MD, Shabbir Raines, NORBERT,  Foster Lawson, CNP, Telma Osuna, CNP, Ravi Tellez, CNP,  Gerson Galarza, [facility], Vince Lay, CNP, Sammie Landau, CNP, Tenisha Bowen, CNP, Radha Louis, CNP, Amalia Obrien, CNP, TAMMY MeadeC, Herman Zavala, CNS, Ollie Mahoeny, CNP, [facility], [facility]    Progress Note    2023    8:30 AM    Name:   Danelle Ag  MRN:     [id number]     Acct:      [de-identified]   Room:   [address] [place] [address] Day:  3  Admit Date:  2023 12:32 AM    PCP:   Karina Rush  Code Status:  Full Code    Subjective:     C/C:   Chief Complaint   Patient presents with    Hematuria     Transfer from [facility] d/t hematuria. Pt with history of cystoscopy with bladder bioposy and fulguration, urolift x 4  per Dr. Tripp Bryson on . Pt with urinary retention, dribbling and passing blood clots today. Pt with history of atrial fibrillation, currently on Coumadin. INR-3.71 today. Pt reports he has held his coumadin for the past three days. Pt arrives with casey catheter in place with scant amount of bright red blood. Interval History Status:     Pt is more awake alert today, good attitude    Brief History:     73-year-old male transferred from [facility] secondary to hematuria. He does have a history of recent cystoscopy with bladder biopsy and fulguration, UroLift x4 with Dr. Tripp Bryson. This was on 2023. He is on Coumadin for atrial fibrillation with a supratherapeutic INR on admission. Currently is being held his most recent INR is 2.8. His chief complaint on arrival was difficulty in urination and noticing blood in his urine    Past Medical History:   has a past medical history of Atrial fibrillation ([facility] Benign prostatic hyperplasia with urinary frequency, Cancer ([facility] Cardiomyopathy ([facility] CKD (chronic kidney disease), Coronary atherosclerosis, Gout, Morbid obesity with body mass index (BMI) of 45.0 to 49.9 in adult [facility]), Obesity, Pulmonary hypertension ([facility] PVD (peripheral vascular disease) ([facility] Right ventricular dilation, Tricuspid regurgitation, and Type 2 diabetes mellitus with diabetic polyneuropathy ([facility] Social History:   reports that he has never smoked. He has never used smokeless tobacco. He reports that he does not currently use alcohol. He reports that he does not use drugs. Family History:   Family History   Problem Relation Age of Onset    Heart Disease Mother     Breast Cancer Sister     Cancer Brother         prostate       Review of Systems:     12 point ROS negative other than what is noted in brief history  He does have some difficulty with urination and is feeling ill in general  Has also noted blood in his urine    Physical Examination:        Vitals  BP (!) 89/41   Pulse 58   Temp 97.3 °F (36.3 °C) (Oral)   Resp 16   Ht 5' 11\" (1.803 m)   Wt (!) 309 lb 1.4 oz (140.2 kg)   SpO2 98%   BMI 43.11 kg/m²   Temp (24hrs), Av.4 °F (36.3 °C), Min:96.8 °F (36 °C), Max:97.7 °F (36.5 °C)    Recent Labs     23  1121 23  1807 23  0755   POCGLU 64* 127* 139* 73*         Physical Exam  Vitals reviewed. Constitutional:       Appearance: Normal appearance. He is ill-appearing. HENT:      Head: Normocephalic. Eyes:      Extraocular Movements: Extraocular movements intact.       Conjunctiva/sclera: Conjunctivae normal.   Cardiovascular:      Rate and Rhythm: Normal rate and regular rhythm. Pulses: Normal pulses. Heart sounds: Normal heart sounds. Pulmonary:      Effort: Pulmonary effort is normal.      Breath sounds: Normal breath sounds. Abdominal:      Comments: Slightly distended abdomen   Neurological:      General: No focal deficit present. Mental Status: He is alert. Psychiatric:         Mood and Affect: Mood normal.         Behavior: Behavior normal.         Thought Content: Thought content normal.         Judgment: Judgment normal.         Medications: Allergies:  No Known Allergies    Current Meds:   Scheduled Meds:    cephALEXin  500 mg Oral 2 times per day    epoetin pedro-epbx  8,000 Units IntraVENous Once per day on Mon Wed Fri    amLODIPine  5 mg Oral Daily    atorvastatin  80 mg Oral Daily    finasteride  5 mg Oral Daily    [Held by provider] glipiZIDE  5 mg Oral QAM AC    hydrALAZINE  50 mg Oral BID    [Held by provider] lisinopril  20 mg Oral BID    [Held by provider] metFORMIN  1,000 mg Oral Daily with breakfast    vitamin B-12  1,000 mcg Oral Daily    tamsulosin  0.4 mg Oral Daily    [Held by provider] potassium chloride  10 mEq Oral Daily    sodium chloride flush  5-40 mL IntraVENous 2 times per day    metroNIDAZOLE  500 mg Oral 3 times per day    insulin lispro  0-8 Units SubCUTAneous TID WC    insulin lispro  0-4 Units SubCUTAneous Nightly     Continuous Infusions:    sodium chloride      dextrose      sodium chloride 75 mL/hr at 02/20/23 0435     PRN Meds: heparin (porcine), heparin (porcine), midodrine, bisacodyl, sodium chloride flush, sodium chloride, ondansetron **OR** ondansetron, polyethylene glycol, acetaminophen **OR** acetaminophen, morphine, opium-belladonna, dextrose bolus **OR** dextrose bolus, glucagon (rDNA), dextrose, glucose    Data:     I/O (24Hr):     Intake/Output Summary (Last 24 hours) at 2/22/2023 0830  Last data filed at 2/22/2023 0523  Gross per 24 hour Intake 300 ml   Output -1750 ml   Net 2050 ml         Labs:  Hematology:  Recent Labs     02/19/23  0946 02/20/23  0255 02/20/23  1551 02/21/23  0543 02/22/23  0623   WBC 19.8* 16.7*  --  16.6*  --    RBC 3.05* 2.60*  --  2.44*  --    HGB 8.9* 7.6* 7.7* 7.3*  --    HCT 29.4* 26.4* 26.7* 25.1*  --    MCV 96.4 101.5  --  102.9  --    MCH 29.2 29.2  --  29.9  --    MCHC 30.3 28.8  --  29.1  --    RDW 13.5 13.8  --  13.9  --     272  --  277  --    MPV 10.5 10.6  --  10.5  --    INR  --  3.9  --  2.8 1.9       Chemistry:  Recent Labs     02/19/23  1532 02/20/23  0255 02/21/23  0543    138 138   K 5.0 4.8 5.0    105 104   CO2 18* 20 19*   GLUCOSE 191* 113* 75   BUN 32* 36* 42*   CREATININE 3.60* 4.28* 6.03*   ANIONGAP 15 13 15   LABGLOM 16* 13* 9*   CALCIUM 8.2* 8.0* 7.8*   PHOS  --   --  6.1*       Recent Labs     02/20/23 2035 02/21/23  0543 02/21/23  0749 02/21/23  1121 02/21/23  1807 02/21/23 2029 02/22/23  0755   PROT  --  6.2*  --   --   --   --   --    LABALBU  --  2.9*  --   --   --   --   --    AST  --  14  --   --   --   --   --    ALT  --  7  --   --   --   --   --    ALKPHOS  --  113  --   --   --   --   --    BILITOT  --  0.4  --   --   --   --   --    POCGLU 175*  --  76 64* 127* 139* 73*       ABG:No results found for: POCPH, PHART, PH, POCPCO2, HBT2JUE, PCO2, POCPO2, PO2ART, PO2, POCHCO3, QME7HMR, HCO3, NBEA, PBEA, BEART, BE, THGBART, THB, FZD7XPZ, HOCX2OCG, J0NEBKRN, O2SAT, FIO2  No results found for: SPECIAL  No results found for: CULTURE    Radiology:  No results found.     Assessment:        Primary Problem  Clot retention of urine    Active Hospital Problems    Diagnosis Date Noted    Stage 3a [facility] ([facility] [N18.31] 02/21/2023     Priority: Medium    Supratherapeutic INR [R79.1] 02/20/2023     Priority: Medium    Orthostatic hypotension [I95.1] 02/20/2023     Priority: Medium    Acute blood loss anemia: Due to hematuria/bladder cancer [D62] 02/20/2023 Priority: Medium    Urinary retention [R33.9] 02/20/2023     Priority: Medium    Clot retention of urine [R33.8] 02/19/2023     Priority: Medium    Gross hematuria [R31.0] 02/19/2023     Priority: Medium    On continuous oral anticoagulation [Z79.01] 02/19/2023     Priority: Medium    Colitis [K52.9] 02/19/2023     Priority: Medium    Bladder carcinoma ([facility] [C67.9] 02/19/2023     Priority: Medium    MATTI (acute kidney injury) ([facility] [N17.9] 02/19/2023     Priority: Medium    Hyperkalemia [E87.5] 02/19/2023     Priority: Medium    Atrial fibrillation ([facility] [I48.91] 07/01/2022     Priority: Medium    Type 2 diabetes mellitus with diabetic polyneuropathy ([facility] [E11.42]      Priority: Medium         Plan:        OR for cystoscopy today  Continue to hold anticoagulation  Further recommendations from urology to follow regarding surgery or not  If no surgery planned patient can be discharged next 24 to 48 hours  Temp dialysis catheter put in my neprhology yesterday  Worsening cr yesterday, very poor residual kidney function  Bgm adequate watch for hypoglycemia      IP CONSULT TO UROLOGY  IP CONSULT TO HOSPITALIST  IP CONSULT TO UROLOGY  IP CONSULT TO NEPHROLOGY    Fortunato Pratt MD  2/22/2023  8:30 AM

## 2023-02-22 NOTE — PLAN OF CARE
Problem: Discharge Planning  Goal: Discharge to home or other facility with appropriate resources  Outcome: Progressing     Problem: Skin/Tissue Integrity  Goal: Absence of new skin breakdown  Description: 1. Monitor for areas of redness and/or skin breakdown  2. Assess vascular access sites hourly  3. Every 4-6 hours minimum:  Change oxygen saturation probe site  4. Every 4-6 hours:  If on nasal continuous positive airway pressure, respiratory therapy assess nares and determine need for appliance change or resting period.   Outcome: Progressing     Problem: Safety - Adult  Goal: Free from fall injury  Outcome: Progressing     Problem: Pain  Goal: Verbalizes/displays adequate comfort level or baseline comfort level  Outcome: Progressing     Problem: Chronic Conditions and Co-morbidities  Goal: Patient's chronic conditions and co-morbidity symptoms are monitored and maintained or improved  Outcome: Progressing     Problem: Nutrition Deficit:  Goal: Optimize nutritional status  Outcome: Progressing     Problem: ABCDS Injury Assessment  Goal: Absence of physical injury  Outcome: Progressing

## 2023-02-22 NOTE — PLAN OF CARE
Problem: Discharge Planning  Goal: Discharge to home or other facility with appropriate resources  2/22/2023 0212 by Jazmine Martinez RN  Outcome: Progressing  2/21/2023 1812 by Claire Moya RN  Outcome: Progressing     Problem: Skin/Tissue Integrity  Goal: Absence of new skin breakdown  Description: 1. Monitor for areas of redness and/or skin breakdown  2. Assess vascular access sites hourly  3. Every 4-6 hours minimum:  Change oxygen saturation probe site  4. Every 4-6 hours:  If on nasal continuous positive airway pressure, respiratory therapy assess nares and determine need for appliance change or resting period.   2/22/2023 1472 by Jazmine Martinez RN  Outcome: Progressing  2/21/2023 1812 by Claire Moya RN  Outcome: Progressing     Problem: Safety - Adult  Goal: Free from fall injury  2/22/2023 0212 by Jazmine Martinez RN  Outcome: Progressing  2/21/2023 1812 by Claire Moya RN  Outcome: Progressing     Problem: Pain  Goal: Verbalizes/displays adequate comfort level or baseline comfort level  2/22/2023 0212 by Jazmine Martinez RN  Outcome: Progressing  2/21/2023 1812 by Claire Moya RN  Outcome: Progressing     Problem: Chronic Conditions and Co-morbidities  Goal: Patient's chronic conditions and co-morbidity symptoms are monitored and maintained or improved  2/22/2023 0212 by Jazmine Martinez RN  Outcome: Progressing  2/21/2023 1812 by Claire Moya RN  Outcome: Progressing

## 2023-02-22 NOTE — PROGRESS NOTES
Patient returned from dialysis casey clotted off attempted to hand irrigate patient c/o severe pain unable to get unclotted >R [facility] via perfect serve down to floor .  Was able to unclot  casey back wide open red with small to moderate clots will continue to monitor

## 2023-02-22 NOTE — PROGRESS NOTES
Physical Therapy        Physical Therapy Cancel Note      DATE: 2023    NAME: Nina Foster  MRN: [id number]   : 1940      Patient not seen this date for Physical Therapy due to:    Hemodialysis:Pt off the floor for dialysis. Will check back in pm as time allows .       Electronically signed by Aubrey Paniagua PT on 2023 at 12:43 PM

## 2023-02-22 NOTE — CONSENT
Informed Consent for Blood Component Transfusion Note    I have discussed with the patient the rationale for blood component transfusion; its benefits in treating or preventing fatigue, organ damage, or death; and its risk which includes mild transfusion reactions, rare risk of blood borne infection, or more serious but rare reactions. I have discussed the alternatives to transfusion, including the risk and consequences of not receiving transfusion. The patient had an opportunity to ask questions and had agreed to proceed with transfusion of blood components.     Electronically signed by Kellie Salcido MD on 2/22/23 at 12:49 PM EST

## 2023-02-22 NOTE — PROGRESS NOTES
Nephrology Progress Note      SUBJECTIVE      Patient seen and examined bedside  No acute issues overnight  As per nurse patient had an episode of hypotension as physical therapy started to work with him, has been hypotensive during the hospital course, started on midodrine as needed  Has hematuria, on continuous bladder irrigation  Overnight has remained afebrile, heart rate 60s to 70s, blood pressure labile, saturating well on room air  Labs this morning showed sodium 137, potassium 4.9, chloride 103, bicarb 20, BUN 38, creatinine 5.70, glucose 69    OBJECTIVE      CURRENT TEMPERATURE:  Temp: 98.2 °F (36.8 °C)  MAXIMUM TEMPERATURE OVER 24HRS:  Temp (24hrs), Av.6 °F (36.4 °C), Min:96.8 °F (36 °C), Max:98.2 °F (36.8 °C)    CURRENT RESPIRATORY RATE:  Resp: 16  CURRENT PULSE:  Heart Rate: 68  CURRENT BLOOD PRESSURE:  BP: (!) 140/62  24HR BLOOD PRESSURE RANGE:  Systolic (53WYE), XDZ:339 , Min:72 , KUK:907   ; Diastolic (03NTE), HGM:33, Min:36, Max:71    24HR INTAKE/OUTPUT:    Intake/Output Summary (Last 24 hours) at 2023 1445  Last data filed at 2023 8351  Gross per 24 hour   Intake 300 ml   Output -1500 ml   Net 1800 ml       PHYSICAL EXAM      GENERAL APPEARANCE:Awake, alert, in no acute distress  SKIN: warm   EYES: conjunctivae normal  NECK:  JVD: None   PULMONARY: On auscultation bilaterally  CADRDIOVASCULAR: Normal heart sounds  ABDOMEN: Soft, nontender, nondistended  EXTREMITIES: No pedal edema    CURRENT MEDICATIONS      0.9 % sodium chloride infusion, PRN  albumin human 25 % IV solution 25 g, PRN  cephALEXin (KEFLEX) capsule 500 mg, 2 times per day  epoetin pedro-epbx (RETACRIT) injection 8,000 Units, Once per day on   heparin (porcine) injection 1,200 Units, PRN  heparin (porcine) injection 1,300 Units, PRN  midodrine (PROAMATINE) tablet 2.5 mg, TID PRN  bisacodyl (DULCOLAX) EC tablet 5 mg, Daily PRN  [Held by provider] amLODIPine (NORVASC) tablet 5 mg, Daily  atorvastatin (LIPITOR) tablet 80 mg, Daily  finasteride (PROSCAR) tablet 5 mg, Daily  [Held by provider] glipiZIDE (GLUCOTROL) tablet 5 mg, QAM AC  [Held by provider] hydrALAZINE (APRESOLINE) tablet 50 mg, BID  [Held by provider] lisinopril (PRINIVIL;ZESTRIL) tablet 20 mg, BID  [Held by provider] metFORMIN (GLUCOPHAGE) tablet 1,000 mg, Daily with breakfast  vitamin B-12 (CYANOCOBALAMIN) tablet 1,000 mcg, Daily  tamsulosin (FLOMAX) capsule 0.4 mg, Daily  [Held by provider] potassium chloride (KLOR-CON M) extended release tablet 10 mEq, Daily  sodium chloride flush 0.9 % injection 5-40 mL, 2 times per day  sodium chloride flush 0.9 % injection 10 mL, PRN  0.9 % sodium chloride infusion, PRN  ondansetron (ZOFRAN-ODT) disintegrating tablet 4 mg, Q8H PRN   Or  ondansetron (ZOFRAN) injection 4 mg, Q6H PRN  polyethylene glycol (GLYCOLAX) packet 17 g, Daily PRN  acetaminophen (TYLENOL) tablet 650 mg, Q6H PRN   Or  acetaminophen (TYLENOL) suppository 650 mg, Q6H PRN  morphine (PF) injection 2 mg, Q4H PRN  opium-belladonna (B&O SUPPRETTES) 16.2-60 MG suppository 60 mg, Q8H PRN  dextrose bolus 10% 125 mL, PRN   Or  dextrose bolus 10% 250 mL, PRN  glucagon (rDNA) injection 1 mg, PRN  dextrose 10 % infusion, Continuous PRN  metroNIDAZOLE (FLAGYL) tablet 500 mg, 3 times per day  insulin lispro (HUMALOG) injection vial 0-8 Units, TID WC  insulin lispro (HUMALOG) injection vial 0-4 Units, Nightly  glucose chewable tablet 16 g, PRN  0.9 % sodium chloride infusion, Continuous          LABS      CBC:   Recent Labs     02/20/23  0255 02/20/23  1551 02/21/23  0543   WBC 16.7*  --  16.6*   RBC 2.60*  --  2.44*   HGB 7.6* 7.7* 7.3*   HCT 26.4* 26.7* 25.1*   .5  --  102.9   MCH 29.2  --  29.9   MCHC 28.8  --  29.1   RDW 13.8  --  13.9     --  277   MPV 10.6  --  10.5      BMP:   Recent Labs     02/20/23  0255 02/21/23  0543 02/22/23  0623    138 137   K 4.8 5.0 4.9    104 103   CO2 20 19* 20   BUN 36* 42* 38*   CREATININE 4.28* 6.03* 5.70*   GLUCOSE 113* 75 69*   CALCIUM 8.0* 7.8* 7.8*        PHOSPHORUS:    Recent Labs     02/21/23  0543   PHOS 6.1*       ALBUMIN:   Recent Labs     02/21/23  0543   LABALBU 2.9*     SPEP:   Lab Results   Component Value Date/Time    PROT 6.2 02/21/2023 05:43 AM       HEPBSAG:  Lab Results   Component Value Date/Time    HEPBSAG NONREACTIVE 02/21/2023 11:29 AM     HEPCAB:  Lab Results   Component Value Date/Time    HEPCAB NONREACTIVE 02/21/2023 11:29 AM     C3:   Lab Results   Component Value Date    C3 127 02/20/2023     C4:   Lab Results   Component Value Date    C4 31 02/20/2023         RADIOLOGY      Reviewed as available. ASSESSMENT      #1 acute kidney injury secondary to obstructive uropathy related to bladder clots and complicated further by hypotension/concomitant diuretic and ACE inhibitor use -initiated dialysis on 7/40/2758  #2 metabolic acidosis  #3 chronic kidney disease stage III with baseline creatinine 1.4-1.6. Never seen a nephrologist in the past  #4 hospitalized for evaluation of hematuria with background history of bladder tumor status post TURBT in the past.  Awaiting cystoscopy. Also recently had UroLift procedure late January  #5 type 2 diabetes  #6 history of BPH  #7 anemia    PLAN      1. Plan for repeat dialysis session today   2. Strict intake and output monitoring  3. Renal diet  4. Hematuria management as per urology  6. Monitor hemodynamics closely, use midodrine as needed, hold all the blood pressure medications  6. Close monitoring of electrolytes and renal function, BMP in the a.m.  7.  Further recommendations to follow after discussing with attending      Vandana Bojorquez  PGY-2  Internal Medicine  [address] [place] [address]   2:47 PM 2/22/2023   Attending Physician Statement  I have discussed the care of Mary Matos, including pertinent history and exam findings with the resident/fellow.  I have reviewed the key elements of all parts of the encounter with the resident/fellow. I have seen and examined the patient with the resident/fellow. I agree with the assessment and plan and status of the problem list as documented. Addiitionally pt was evaluated on dialysis. Orders reviewed  CBI continues.  Urology following for hematuria  following    Yara Gil MD , MD

## 2023-02-22 NOTE — PROGRESS NOTES
Dr Anne-Marie Salas informed of patients low BP and BP drop with standing , via perfect serve message read orders received .

## 2023-02-22 NOTE — CARE COORDINATION
[facility] following for HD needs. Referral to [facility] [place] under [facility]. [facility]ANO notified HD manager of need for CXR to complete referral.   Await financial and medical clearance. Zaid Garcia at [address] [facility], referral received and under review.

## 2023-02-22 NOTE — PROGRESS NOTES
Occupational Therapy  Facility/Department: [facility] RENAL//MED SURG  Occupational Therapy Daily Treatment Note    Name: Hamilton Mullins  : 1940  MRN: [id number]  Date of Service: 2023    Discharge Recommendations:  Patient would benefit from continued therapy after discharge  OT Equipment Recommendations  Equipment Needed: Yes  Mobility Devices: ADL Assistive Devices  ADL Assistive Devices: Toileting - Drop Arm Commode, Heavy Duty Drop Arm Commode;Reacher;Sock-Aid Hard       Patient Diagnosis(es): The primary encounter diagnosis was Urinary retention. A diagnosis of MATTI (acute kidney injury) ([facility] was also pertinent to this visit. Past Medical History:  has a past medical history of Atrial fibrillation ([facility] Benign prostatic hyperplasia with urinary frequency, Cancer ([address] Cardiomyopathy ([facility] CKD (chronic kidney disease), Coronary atherosclerosis, Gout, Morbid obesity with body mass index (BMI) of 45.0 to 49.9 in [facility]), Obesity, Pulmonary hypertension ([facility] PVD (peripheral vascular disease) ([facility] Right ventricular dilation, Tricuspid regurgitation, and Type 2 diabetes mellitus with diabetic polyneuropathy ([facility] Past Surgical History:  has a past surgical history that includes back surgery; Knee Arthroplasty (Left); Colonoscopy (N/A); Colonoscopy w/ biopsies (N/A, 2022); Cystoscopy; Finger surgery; Cystoscopy; Transurethral resection of bladder tumor; Cystocopy (2022); Cystoscopy (N/A, 2022); and IR NONTUNNELED VASCULAR CATHETER > 5 YEARS (2023). Assessment   Performance deficits / Impairments: Decreased functional mobility ; Decreased ADL status; Decreased endurance;Decreased high-level IADLs;Decreased safe awareness;Decreased balance;Decreased strength;Decreased cognition;Decreased fine motor control  Assessment: Patient would benefit from continued acute OT services to address functional deficits through skilled intervention impacting performance and safety with ADLs/IADLs  Prognosis: Good  REQUIRES OT FOLLOW-UP: Yes  Activity Tolerance  Activity Tolerance: Treatment limited secondary to medical complications (free text)  Activity Tolerance Comments: Patient with c/o dizziness when beginning while sitting EOB, following standing , pt was moved to Trendelenburg in bed, BP assessed 80/54. RN notified, pt with RN student upon exit        Plan   Occupational Therapy Plan  Times Per Week: 3-4x/wk  Current Treatment Recommendations: Strengthening, Balance training, Functional mobility training, Endurance training, Safety education & training, Positioning, Patient/Caregiver education & training, Equipment evaluation, education, & procurement, Self-Care / ADL, Home management training     Restrictions  Restrictions/Precautions  Restrictions/Precautions: Fall Risk  Required Braces or Orthoses?: No  Position Activity Restriction  Other position/activity restrictions: up with assist    Subjective   General  Chart Reviewed: Yes  Patient assessed for rehabilitation services?: Yes  Family / Caregiver Present: No  General Comment  Comments: RN ok'd patient for OT session. Pt pleasant, cooperative and agreeable. Pt declines pain but reports dizziness. Pt reported soreness and weakness in LE. Pt able to progress with therapy session. Upon COREAS arrival/exit pt supine in bed w/ HOB elevated and pillow support under LE. Pt had call light, all needs met and RN notified at end of session. Pt completed bed mobility, sitting EOB while completing ADLs and stood 1X from EOB. Pt returned to supine in bed d/t increased dizziness. RN student assisted with sit to supine bed mobility this date. Objective   O2 Device: None (Room air)    Safety Devices  Type of Devices: Nurse notified;Gait belt;Left in bed;Bed alarm in place;Call light within reach  Restraints  Restraints Initially in Place: No  Bed Mobility Training  Bed Mobility Training: Yes  Overall Level of Assistance:  Moderate assistance;Assist X2;Additional time  Supine to Sit: Moderate assistance; Additional time  Sit to Supine: Moderate assistance;Assist X2;Additional time  Balance  Sitting: With support (EOB, SBA for ~12 min engaging in static/dynamic tasks for completing ADLs. pt holding onto bedrails to support self throughout sitting EOB, occasional lateral leaning, pt able to correct.)  Standing: With support (Mod A for safety, w/ RW, static from EOB for ~1 min. Pt reported feeling very dizzy and needed to lay back down d/t low BP)  Transfer Training  Transfer Training: Yes  Overall Level of Assistance: Moderate assistance; Adaptive equipment (w/ RW)  Interventions: Verbal cues; Safety awareness training (VCs for hand placement of hands on bed before standing)  Sit to Stand: Moderate assistance; Adaptive equipment (w/ RW)  Stand to Sit: Minimum assistance; Adaptive equipment (w/ RW)  Gait  Overall Level of Assistance:  (ZACHERY, pt too dizzy to attempt)  Assistive Device: Gait belt;Walker, rolling        ADL  Grooming: Stand by assistance; Increased time to complete;Verbal cueing;Setup  Grooming Skilled Clinical Factors: Pt completed washing face/neck and oral care while sitting EOB, VCs for use of oral sponge. -good return  UE Bathing: Increased time to complete;Setup;Stand by assistance  UE Bathing Skilled Clinical Factors: Pt completed washing UE/chest/stomach/under UE  UE Dressing: Increased time to complete;Setup;Stand by assistance  UE Dressing Skilled Clinical Factors: Pt donned clean gown, able to thread arms into gown this date, assisted with tying back of gown  Additional Comments: Pt completed all ADL tasks while seated at EOB. Cognition  Overall Cognitive Status: Exceptions  Following Commands: Follows one step commands with repetition  Attention Span: Difficulty dividing attention; Attends with cues to redirect  Safety Judgement: Decreased awareness of need for assistance  Problem Solving: Assistance required to correct errors made;Assistance required to identify errors made  Insights: Decreased awareness of deficits  Initiation: Requires cues for some  Sequencing: Requires cues for some  Orientation  Overall Orientation Status: Within Functional Limits  Orientation Level: Oriented to place;Oriented to time;Oriented to person;Oriented to situation           Education Given To: Patient  Education Provided: Role of Therapy;Transfer Training;Precautions; ADL Adaptive Strategies; Plan of Care;Energy Conservation  Education Provided Comments: POC, precautions with dizziness upon sitting and standing, deep breathing during transfer training, EC while completing tasks EOB -good return  Education Method: Verbal;Demonstration  Barriers to Learning: Cognition; Hearing  Education Outcome: Verbalized understanding;Continued education needed      AM-PAC Score  AM-PAC Inpatient Daily Activity Raw Score: 16 (02/22/23 1325)  AM-PAC Inpatient ADL T-Scale Score : 35.96 (02/22/23 1325)  ADL Inpatient CMS 0-100% Score: 53.32 (02/22/23 1325)  ADL Inpatient CMS G-Code Modifier : CK (02/22/23 1325)       Goals  Short Term Goals  Time Frame for Short Term Goals: Patient will, by discharge  Short Term Goal 1: demo UB ADLs at Mod I  Short Term Goal 2: demo LB ADLs at [facility] A using AE PRN  Short Term Goal 3: demo toileting tasks at [facility] [address] 4: demo functional transfers/mobility using LRD at [facility] to engage in ADLs  Short Term Goal 5: demo 10+ min of dynamic standing tolerance at [facility] to engage in ADLs  Short Term Goal 6: demo 15+ min of dynamic sitting balance at [facility] to engage in ADLs safely  Short Term Goal 7: notify OTR to update POC as patient progresses       Therapy Time   Individual Concurrent Group Co-treatment   Time In 0933         Time Out 1006         Minutes 33         Timed Code Treatment Minutes: [address], COREAS/L

## 2023-02-22 NOTE — CARE COORDINATION
Transitional Planning  Spoke with patient, wanting home care. [address] [address] Care   [place]  Referrals sent     [facility]. Call from mEma Bolanos at [facility], able to accept patient. Called from Edouard Chand at [address]-unable to accept. 1257 pm Called Daniel Roe, spoke with Sirena, notified another provider is providing service.

## 2023-02-22 NOTE — PROGRESS NOTES
Dialysis Post Treatment Note  Vitals:    02/22/23 1515   BP: (!) 141/57   Pulse: 62   Resp: 18   Temp: 98.1 °F (36.7 °C)   SpO2: 96%     Pre-Weight = 135.3kg  Post-weight = Weight: 298 lb 15.1 oz (135.6 kg)  Total Liters Processed = Blood Volume Processed (Liters): 53.97 l/min  Rinseback Volume (mL) = Rinseback Volume (ml): 250 ml  Net Removal (mL) =  -400  Patient's dry weight= Not established  Type of access used= CVC Right  Length of treatment=180      Pt completed 2nd tx with no fluid removed. Midodrine given for BP support with positive effect. 1 unit of blood was transfused intra-HD. No adverse event pre, intra & post dialysis. No access issues. Returned to room per stretcher.

## 2023-02-22 NOTE — PROGRESS NOTES
Dialysis Time Out  To be done by RN and tech or 2 RNs  Staff Names Brian Siemens RN    [x]  Identity of the patient using 2 patient identifiers  [x]  Consent for treatment  [x]  Equipment-proper machine and dialyzer  [x]  B-Hep B status  [x]  Orders- to include bath, blood flow, dialyzer, time and fluid removal  [x]  Access-Correct site and in working order  [x]  Time for patient to ask questions.

## 2023-02-23 ENCOUNTER — APPOINTMENT (OUTPATIENT)
Dept: GENERAL RADIOLOGY | Age: 83
DRG: 665 | End: 2023-02-23
Payer: MEDICARE

## 2023-02-23 ENCOUNTER — ANESTHESIA (OUTPATIENT)
Dept: OPERATING ROOM | Age: 83
End: 2023-02-23
Payer: MEDICARE

## 2023-02-23 ENCOUNTER — ANESTHESIA EVENT (OUTPATIENT)
Dept: OPERATING ROOM | Age: 83
End: 2023-02-23
Payer: MEDICARE

## 2023-02-23 LAB
ANA SER QL IA: ABNORMAL
ANCA MYELOPEROXIDASE: <0.3 AU/ML (ref 0–3.5)
ANCA PROTEINASE 3: <0.7 AU/ML (ref 0–2)
ANION GAP SERPL CALCULATED.3IONS-SCNC: 15 MMOL/L (ref 9–17)
BUN SERPL-MCNC: 26 MG/DL (ref 8–23)
CALCIUM SERPL-MCNC: 7.6 MG/DL (ref 8.6–10.4)
CHLORIDE SERPL-SCNC: 101 MMOL/L (ref 98–107)
CO2 SERPL-SCNC: 22 MMOL/L (ref 20–31)
CREAT SERPL-MCNC: 4.99 MG/DL (ref 0.7–1.2)
DSDNA IGG SER QL IA: 12 IU/ML
GFR SERPL CREATININE-BSD FRML MDRD: 11 ML/MIN/1.73M2
GLUCOSE BLD-MCNC: 103 MG/DL (ref 75–110)
GLUCOSE BLD-MCNC: 108 MG/DL (ref 75–110)
GLUCOSE BLD-MCNC: 89 MG/DL (ref 75–110)
GLUCOSE SERPL-MCNC: 90 MG/DL (ref 70–99)
HCT VFR BLD AUTO: 23.6 % (ref 40.7–50.3)
HGB BLD-MCNC: 7.2 G/DL (ref 13–17)
INR PPP: 1.5
NUCLEAR IGG SER IA-RTO: 0.2 U/ML
POTASSIUM SERPL-SCNC: 4.2 MMOL/L (ref 3.7–5.3)
PROTHROMBIN TIME: 15.3 SEC (ref 9.1–12.3)
SODIUM SERPL-SCNC: 138 MMOL/L (ref 135–144)

## 2023-02-23 PROCEDURE — 2580000003 HC RX 258: Performed by: UROLOGY

## 2023-02-23 PROCEDURE — 7100000000 HC PACU RECOVERY - FIRST 15 MIN: Performed by: UROLOGY

## 2023-02-23 PROCEDURE — 71045 X-RAY EXAM CHEST 1 VIEW: CPT

## 2023-02-23 PROCEDURE — 99231 SBSQ HOSP IP/OBS SF/LOW 25: CPT | Performed by: INTERNAL MEDICINE

## 2023-02-23 PROCEDURE — C1769 GUIDE WIRE: HCPCS | Performed by: UROLOGY

## 2023-02-23 PROCEDURE — 6370000000 HC RX 637 (ALT 250 FOR IP): Performed by: INTERNAL MEDICINE

## 2023-02-23 PROCEDURE — 2709999900 HC NON-CHARGEABLE SUPPLY: Performed by: UROLOGY

## 2023-02-23 PROCEDURE — 2580000003 HC RX 258: Performed by: NURSE ANESTHETIST, CERTIFIED REGISTERED

## 2023-02-23 PROCEDURE — 0VB08ZZ EXCISION OF PROSTATE, VIA NATURAL OR ARTIFICIAL OPENING ENDOSCOPIC: ICD-10-PCS | Performed by: UROLOGY

## 2023-02-23 PROCEDURE — 6360000002 HC RX W HCPCS: Performed by: NURSE ANESTHETIST, CERTIFIED REGISTERED

## 2023-02-23 PROCEDURE — 82947 ASSAY GLUCOSE BLOOD QUANT: CPT

## 2023-02-23 PROCEDURE — 85018 HEMOGLOBIN: CPT

## 2023-02-23 PROCEDURE — 0TCB8ZZ EXTIRPATION OF MATTER FROM BLADDER, VIA NATURAL OR ARTIFICIAL OPENING ENDOSCOPIC: ICD-10-PCS | Performed by: UROLOGY

## 2023-02-23 PROCEDURE — 6370000000 HC RX 637 (ALT 250 FOR IP)

## 2023-02-23 PROCEDURE — 2580000003 HC RX 258: Performed by: NURSE PRACTITIONER

## 2023-02-23 PROCEDURE — 7100000001 HC PACU RECOVERY - ADDTL 15 MIN: Performed by: UROLOGY

## 2023-02-23 PROCEDURE — 6360000002 HC RX W HCPCS: Performed by: NURSE PRACTITIONER

## 2023-02-23 PROCEDURE — 6370000000 HC RX 637 (ALT 250 FOR IP): Performed by: STUDENT IN AN ORGANIZED HEALTH CARE EDUCATION/TRAINING PROGRAM

## 2023-02-23 PROCEDURE — 2720000010 HC SURG SUPPLY STERILE: Performed by: UROLOGY

## 2023-02-23 PROCEDURE — 3600000004 HC SURGERY LEVEL 4 BASE: Performed by: UROLOGY

## 2023-02-23 PROCEDURE — 2500000003 HC RX 250 WO HCPCS: Performed by: NURSE ANESTHETIST, CERTIFIED REGISTERED

## 2023-02-23 PROCEDURE — 85610 PROTHROMBIN TIME: CPT

## 2023-02-23 PROCEDURE — 6370000000 HC RX 637 (ALT 250 FOR IP): Performed by: NURSE PRACTITIONER

## 2023-02-23 PROCEDURE — 3600000014 HC SURGERY LEVEL 4 ADDTL 15MIN: Performed by: UROLOGY

## 2023-02-23 PROCEDURE — 85014 HEMATOCRIT: CPT

## 2023-02-23 PROCEDURE — 3700000001 HC ADD 15 MINUTES (ANESTHESIA): Performed by: UROLOGY

## 2023-02-23 PROCEDURE — 80048 BASIC METABOLIC PNL TOTAL CA: CPT

## 2023-02-23 PROCEDURE — 99222 1ST HOSP IP/OBS MODERATE 55: CPT | Performed by: STUDENT IN AN ORGANIZED HEALTH CARE EDUCATION/TRAINING PROGRAM

## 2023-02-23 PROCEDURE — 0TBB8ZZ EXCISION OF BLADDER, VIA NATURAL OR ARTIFICIAL OPENING ENDOSCOPIC: ICD-10-PCS | Performed by: UROLOGY

## 2023-02-23 PROCEDURE — 3700000000 HC ANESTHESIA ATTENDED CARE: Performed by: UROLOGY

## 2023-02-23 PROCEDURE — 1200000000 HC SEMI PRIVATE

## 2023-02-23 PROCEDURE — 36415 COLL VENOUS BLD VENIPUNCTURE: CPT

## 2023-02-23 RX ORDER — PROPOFOL 10 MG/ML
INJECTION, EMULSION INTRAVENOUS PRN
Status: DISCONTINUED | OUTPATIENT
Start: 2023-02-23 | End: 2023-02-23 | Stop reason: SDUPTHER

## 2023-02-23 RX ORDER — ROCURONIUM BROMIDE 10 MG/ML
INJECTION, SOLUTION INTRAVENOUS PRN
Status: DISCONTINUED | OUTPATIENT
Start: 2023-02-23 | End: 2023-02-23 | Stop reason: SDUPTHER

## 2023-02-23 RX ORDER — EPINEPHRINE 0.1 MG/ML
SYRINGE (ML) INJECTION PRN
Status: DISCONTINUED | OUTPATIENT
Start: 2023-02-23 | End: 2023-02-23 | Stop reason: SDUPTHER

## 2023-02-23 RX ORDER — FENTANYL CITRATE 50 UG/ML
INJECTION, SOLUTION INTRAMUSCULAR; INTRAVENOUS PRN
Status: DISCONTINUED | OUTPATIENT
Start: 2023-02-23 | End: 2023-02-23 | Stop reason: SDUPTHER

## 2023-02-23 RX ORDER — CEFAZOLIN SODIUM 1 G/3ML
INJECTION, POWDER, FOR SOLUTION INTRAMUSCULAR; INTRAVENOUS PRN
Status: DISCONTINUED | OUTPATIENT
Start: 2023-02-23 | End: 2023-02-23 | Stop reason: SDUPTHER

## 2023-02-23 RX ORDER — MAGNESIUM HYDROXIDE 1200 MG/15ML
LIQUID ORAL CONTINUOUS PRN
Status: DISCONTINUED | OUTPATIENT
Start: 2023-02-23 | End: 2023-02-23 | Stop reason: HOSPADM

## 2023-02-23 RX ORDER — ONDANSETRON 2 MG/ML
4 INJECTION INTRAMUSCULAR; INTRAVENOUS
Status: DISCONTINUED | OUTPATIENT
Start: 2023-02-23 | End: 2023-02-23 | Stop reason: HOSPADM

## 2023-02-23 RX ORDER — SODIUM CHLORIDE 9 MG/ML
INJECTION, SOLUTION INTRAVENOUS PRN
Status: DISCONTINUED | OUTPATIENT
Start: 2023-02-23 | End: 2023-02-23 | Stop reason: HOSPADM

## 2023-02-23 RX ORDER — SUCCINYLCHOLINE/SOD CL,ISO/PF 100 MG/5ML
SYRINGE (ML) INTRAVENOUS PRN
Status: DISCONTINUED | OUTPATIENT
Start: 2023-02-23 | End: 2023-02-23 | Stop reason: SDUPTHER

## 2023-02-23 RX ORDER — OXYCODONE HYDROCHLORIDE 5 MG/1
10 TABLET ORAL EVERY 4 HOURS PRN
Status: DISCONTINUED | OUTPATIENT
Start: 2023-02-23 | End: 2023-02-28 | Stop reason: HOSPADM

## 2023-02-23 RX ORDER — OXYCODONE HYDROCHLORIDE 5 MG/1
5 TABLET ORAL EVERY 6 HOURS PRN
Status: DISCONTINUED | OUTPATIENT
Start: 2023-02-23 | End: 2023-02-28 | Stop reason: HOSPADM

## 2023-02-23 RX ORDER — HYDRALAZINE HYDROCHLORIDE 20 MG/ML
10 INJECTION INTRAMUSCULAR; INTRAVENOUS
Status: DISCONTINUED | OUTPATIENT
Start: 2023-02-23 | End: 2023-02-23 | Stop reason: HOSPADM

## 2023-02-23 RX ORDER — LABETALOL HYDROCHLORIDE 5 MG/ML
10 INJECTION, SOLUTION INTRAVENOUS
Status: DISCONTINUED | OUTPATIENT
Start: 2023-02-23 | End: 2023-02-23 | Stop reason: HOSPADM

## 2023-02-23 RX ORDER — SODIUM CHLORIDE 0.9 % (FLUSH) 0.9 %
5-40 SYRINGE (ML) INJECTION EVERY 12 HOURS SCHEDULED
Status: DISCONTINUED | OUTPATIENT
Start: 2023-02-23 | End: 2023-02-23 | Stop reason: HOSPADM

## 2023-02-23 RX ORDER — SODIUM CHLORIDE 0.9 % (FLUSH) 0.9 %
5-40 SYRINGE (ML) INJECTION PRN
Status: DISCONTINUED | OUTPATIENT
Start: 2023-02-23 | End: 2023-02-23 | Stop reason: HOSPADM

## 2023-02-23 RX ORDER — SODIUM CHLORIDE 9 MG/ML
INJECTION, SOLUTION INTRAVENOUS CONTINUOUS PRN
Status: DISCONTINUED | OUTPATIENT
Start: 2023-02-23 | End: 2023-02-23 | Stop reason: SDUPTHER

## 2023-02-23 RX ORDER — ONDANSETRON 2 MG/ML
INJECTION INTRAMUSCULAR; INTRAVENOUS PRN
Status: DISCONTINUED | OUTPATIENT
Start: 2023-02-23 | End: 2023-02-23 | Stop reason: SDUPTHER

## 2023-02-23 RX ORDER — LIDOCAINE HYDROCHLORIDE 10 MG/ML
INJECTION, SOLUTION EPIDURAL; INFILTRATION; INTRACAUDAL; PERINEURAL PRN
Status: DISCONTINUED | OUTPATIENT
Start: 2023-02-23 | End: 2023-02-23 | Stop reason: SDUPTHER

## 2023-02-23 RX ORDER — MAGNESIUM HYDROXIDE 1200 MG/15ML
LIQUID ORAL PRN
Status: DISCONTINUED | OUTPATIENT
Start: 2023-02-23 | End: 2023-02-23 | Stop reason: HOSPADM

## 2023-02-23 RX ADMIN — TAMSULOSIN HYDROCHLORIDE 0.4 MG: 0.4 CAPSULE ORAL at 09:18

## 2023-02-23 RX ADMIN — METRONIDAZOLE 500 MG: 500 TABLET, FILM COATED ORAL at 09:20

## 2023-02-23 RX ADMIN — EPINEPHRINE 5 MCG: 0.1 INJECTION INTRACARDIAC; INTRAVENOUS at 16:19

## 2023-02-23 RX ADMIN — FINASTERIDE 5 MG: 5 TABLET, FILM COATED ORAL at 09:20

## 2023-02-23 RX ADMIN — EPINEPHRINE 5 MCG: 0.1 INJECTION INTRACARDIAC; INTRAVENOUS at 16:25

## 2023-02-23 RX ADMIN — PHENYLEPHRINE HYDROCHLORIDE 200 MCG: 10 INJECTION INTRAVENOUS at 16:13

## 2023-02-23 RX ADMIN — Medication 130 MG: at 16:08

## 2023-02-23 RX ADMIN — MORPHINE SULFATE 2 MG: 2 INJECTION, SOLUTION INTRAMUSCULAR; INTRAVENOUS at 02:07

## 2023-02-23 RX ADMIN — Medication 1000 MCG: at 09:18

## 2023-02-23 RX ADMIN — EPINEPHRINE 5 MCG: 0.1 INJECTION INTRACARDIAC; INTRAVENOUS at 16:48

## 2023-02-23 RX ADMIN — SODIUM CHLORIDE, PRESERVATIVE FREE 10 ML: 5 INJECTION INTRAVENOUS at 09:26

## 2023-02-23 RX ADMIN — CEPHALEXIN 500 MG: 500 CAPSULE ORAL at 09:20

## 2023-02-23 RX ADMIN — METRONIDAZOLE 500 MG: 500 TABLET, FILM COATED ORAL at 00:48

## 2023-02-23 RX ADMIN — MORPHINE SULFATE 2 MG: 2 INJECTION, SOLUTION INTRAMUSCULAR; INTRAVENOUS at 09:22

## 2023-02-23 RX ADMIN — CEFAZOLIN 3 G: 1 INJECTION, POWDER, FOR SOLUTION INTRAMUSCULAR; INTRAVENOUS at 16:24

## 2023-02-23 RX ADMIN — EPINEPHRINE 5 MCG: 0.1 INJECTION INTRACARDIAC; INTRAVENOUS at 16:42

## 2023-02-23 RX ADMIN — FENTANYL CITRATE 50 MCG: 50 INJECTION, SOLUTION INTRAMUSCULAR; INTRAVENOUS at 16:06

## 2023-02-23 RX ADMIN — LIDOCAINE HYDROCHLORIDE 50 MG: 10 INJECTION, SOLUTION EPIDURAL; INFILTRATION; INTRACAUDAL; PERINEURAL at 16:06

## 2023-02-23 RX ADMIN — ATORVASTATIN CALCIUM 80 MG: 80 TABLET, FILM COATED ORAL at 09:20

## 2023-02-23 RX ADMIN — SUGAMMADEX 270 MG: 100 INJECTION, SOLUTION INTRAVENOUS at 17:24

## 2023-02-23 RX ADMIN — OXYCODONE HYDROCHLORIDE 5 MG: 5 TABLET ORAL at 18:22

## 2023-02-23 RX ADMIN — ROCURONIUM BROMIDE 30 MG: 10 INJECTION INTRAVENOUS at 16:13

## 2023-02-23 RX ADMIN — ONDANSETRON 4 MG: 2 INJECTION INTRAMUSCULAR; INTRAVENOUS at 16:53

## 2023-02-23 RX ADMIN — PROPOFOL 150 MG: 10 INJECTION, EMULSION INTRAVENOUS at 16:06

## 2023-02-23 RX ADMIN — EPINEPHRINE 5 MCG: 0.1 INJECTION INTRACARDIAC; INTRAVENOUS at 17:15

## 2023-02-23 RX ADMIN — SODIUM CHLORIDE: 0.9 INJECTION, SOLUTION INTRAVENOUS at 15:51

## 2023-02-23 RX ADMIN — CEPHALEXIN 500 MG: 500 CAPSULE ORAL at 20:36

## 2023-02-23 ASSESSMENT — PAIN SCALES - GENERAL
PAINLEVEL_OUTOF10: 7
PAINLEVEL_OUTOF10: 6
PAINLEVEL_OUTOF10: 6
PAINLEVEL_OUTOF10: 10
PAINLEVEL_OUTOF10: 2
PAINLEVEL_OUTOF10: 1
PAINLEVEL_OUTOF10: 9
PAINLEVEL_OUTOF10: 6
PAINLEVEL_OUTOF10: 2

## 2023-02-23 ASSESSMENT — PAIN DESCRIPTION - ORIENTATION
ORIENTATION: RIGHT
ORIENTATION: LEFT
ORIENTATION: RIGHT

## 2023-02-23 ASSESSMENT — PAIN DESCRIPTION - LOCATION
LOCATION: HIP
LOCATION: HIP
LOCATION: GROIN;HIP

## 2023-02-23 ASSESSMENT — ENCOUNTER SYMPTOMS: SHORTNESS OF BREATH: 1

## 2023-02-23 ASSESSMENT — PAIN DESCRIPTION - DESCRIPTORS: DESCRIPTORS: THROBBING

## 2023-02-23 ASSESSMENT — PAIN - FUNCTIONAL ASSESSMENT
PAIN_FUNCTIONAL_ASSESSMENT: 0-10
PAIN_FUNCTIONAL_ASSESSMENT: PREVENTS OR INTERFERES SOME ACTIVE ACTIVITIES AND ADLS

## 2023-02-23 NOTE — CARE COORDINATION
Referral to [place] [address]. CHANO contacted clinic who states staff who handles referrals is out for the day. Contact provided for clinic manager who is at different location today. CHANO spoke with manager Emily Gutierrez who will look into referral.     65  CHANO spoke with Emily Gutierrez at [address] [facility]. States referral has been sent to intake for financial approval. Chair will likely be TTS, will be confirmed once cleared.

## 2023-02-23 NOTE — OP NOTE
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       Procedure(s):  Cystoscopy  Clot evacuation and fulguration  Transurethral resection of bladder tissue  Transurethral resection of prostate    Surgeon(s):  Asmita Griffin MD    Assistant:   Resident: Glory Carolina MD    Anesthesia: General    Estimated Blood Loss (mL): 10 mL    Complications: None    Specimens:   * No specimens in log *    Implants:  * No implants in log *      Drains:   Urinary Catheter 02/23/23 3 Way (Active)       [REMOVED] Urinary Catheter 02/19/23 [address] (Removed)   Catheter Indications Urinary retention (acute or chronic), continuous bladder irrigation or bladder outlet obstruction 02/19/23 0125   Site Assessment Bleeding 02/19/23 0125   Urine Color Bunch 02/19/23 0125   Urine Appearance Clots 02/19/23 0125   Collection Container Standard 02/19/23 0125   Securement Method Securing device (Describe) 02/19/23 0125       [REMOVED] Urinary Catheter 02/19/23 3 Way (Removed)   $ Urethral catheter insertion Inserted for procedure 02/19/23 0250   Catheter Indications Urinary retention (acute or chronic), continuous bladder irrigation or bladder outlet obstruction 02/19/23 0250   Urine Color Bloody 02/19/23 0250       [REMOVED] Urinary Catheter 02/19/23 [address];3 Way (Removed)   $ Urethral catheter insertion $ Not inserted for procedure 02/22/23 2033   Catheter Indications Urinary retention (acute or chronic), continuous bladder irrigation or bladder outlet obstruction 02/23/23 1045   Site Assessment Urethral drainage 02/23/23 1045   Urine Color Bunch 02/23/23 1045   Urine Appearance Cloudy 02/23/23 1045   Urine Odor Malodorous 02/23/23 1045   Collection Container Standard 02/23/23 1045   Securement Method Leg strap 02/23/23 1045   Catheter Care  Soap and water 02/23/23 1045   Catheter Best Practices  Drainage tube clipped to bed;Catheter secured to thigh; Bag below bladder;Bag not on floor; Lack of dependent loop in tubing;Drainage bag less than half full 02/23/23 1045   Status Continuous bladder irrigation;Draining 02/23/23 1045   Output (mL) 250 mL 02/22/23 0837       Findings:   Cystoscopy: 3 cm organized clot in bladder, bladder tumor at bladder dome with surrounding blood      INDICATIONS FOR PROCEDURE:  The patient is a [de-identified] y.o. male who presents with clot retention. He is here today for resection of bladder tumor. The risks and benefits of the procedure as well as possible alternatives and complications were discussed and he consented    DETAILS OF THE PROCEDURE:  The patient was correctly identified in the preoperative holding area. he was brought back to the operating room and placed in the dorsal lithotomy position. EPC cuffs were on, in place, and fully functional. General endotracheal anesthesia was administered. He was given Ancef 2gm IV  for antibiotic prophylaxis. The patient was then prepped and draped in the usual sterile fashion. After appropriate time-out was performed with all parties agreeing, the visual obturator was inserted into the bladder. A thorough and complete cystoscopy was then performed which showed an organized 3 cm clot originating from the bladder dome. The bladder was copiously flushed and irrigated with a catheter tip syringe and Ellik evacuator, and substantial amount of clots were retrieved. Then a regular cystoscope was inserted and additional clot evacuation was done with catheter tipped syringe as well as Ellik evacuator. Then the resectoscope with the working bridge was inserted into the urethra into the bladder. There was some bloody oozing from the prostatic tissue and part of the bleeding prostatic tissue at bladder neck and right lateral lobe was resected.   A UroLift implant at the 9 o'clock position was resected. Then the resectoscope was used to fulgurate and resect bleeding tissue within the bladder. Attempt was made to reach the bladder dome so that the originally seen bladder tumor could be resected however the loop and scope were not long enough to reach the dome of the bladder. The ureteral orifices were patent in the orthotopic location. All bleeding areas were fulgurated and hemostasis was visualized, however the source of bleeding from the bladder dome could not be treated. The cystoscope was removed and a 24Fr 3-way catheter catheter was inserted. Continuous bladder irrigation was started. The patient tolerated the procedure well and was sent to PACU for postoperative monitoring. Dr. Mason Duran., MD was present for all critical portions of the case. DISPOSITION:  The patient was sent to the floor to be continued on continuous bladder irrigation. Patient will need to return to OR for TURBT of the bleeding bladder dome tumor.        Electronically signed by Dimas Marr MD on 2/23/2023 at 5:28 PM

## 2023-02-23 NOTE — ANESTHESIA POSTPROCEDURE EVALUATION
Department of Anesthesiology  Postprocedure Note    Patient: Carrie David  MRN: [id number]  YOB: 1940  Date of evaluation: 2/23/2023      Procedure Summary     Date: 02/23/23 Room / Location: [address]    Anesthesia Start: 1781 Anesthesia Stop: 4142    Procedure: CYSTOSCOPY EVACUATION OF CLOTS, TUR BLADDER TUMOR Diagnosis:       Hematuria, unspecified type      (HEMATURIA)    Surgeons: Janna Closs., MD Responsible Provider: Jimbo Ybarra MD    Anesthesia Type: general ASA Status: 4          Anesthesia Type: No value filed.     Mateo Phase I:      Mateo Phase II:        Anesthesia Post Evaluation    Patient location during evaluation: PACU  Patient participation: complete - patient participated  Level of consciousness: awake  Airway patency: patent  Nausea & Vomiting: no nausea and no vomiting  Complications: no  Cardiovascular status: blood pressure returned to baseline  Respiratory status: acceptable  Hydration status: euvolemic  Comments: BP (!) 139/101   Pulse 70   Temp 97.4 °F (36.3 °C) (Temporal)   Resp 17   Ht 5' 11\" (1.803 m)   Wt 298 lb 15.1 oz (135.6 kg)   SpO2 98%   BMI 41.69 kg/m²

## 2023-02-23 NOTE — PROGRESS NOTES
[facility]  Office: [address], DO, Vale Hernandez, DO, Teddy Mendez, DO, Sarabjit Duran Blood, DO, Keri Last MD, Dian Biswas MD, Susan Prajapati MD, Debbie David MD,  Sarah Edwards MD, Khadijah Rajan MD, Paul Denis, DO, Manuel Boateng MD,  Aaron Parra MD, Raisa Kahn MD, Teresa Alvarez, DO, Ashley Caruso MD, Uli Man MD, Cayden Galaviz, DO, Amy Fowler MD, Anupama Fu MD, Antoine Godfrey MD, Tip Westfall MD, Alan Merritt, DO, Puma Lozano MD, Misael Ortiz MD, Maye Werner, CNP,  Guillaume Rashid, CNP, Jus Rogel, CNP, Beth Williamson, CNP,  Avery Beth, [facility], Amy Holliday, CNP, Mitra Marshall, CNP, Giovani Zimmer, CNP, Maged Mary, CNP, Nick Mckoy, CNP, Kevin Noel PA-C, Chikis Morris, CNS, Hanh Schwab, CNP, Chrystal Husbands, [facility]    Progress Note    2/23/2023    8:37 AM    Name:   Steven Connolly  MRN:     [id number]     Acct:      [de-identified]   Room:   [address] [place], [place] [address] Day:  4  Admit Date:  2/19/2023 12:32 AM    PCP:   Linda Hutchison  Code Status:  Full Code    Subjective:     C/C:   Chief Complaint   Patient presents with    Hematuria     Transfer from [facility] d/t hematuria. Pt with history of cystoscopy with bladder bioposy and fulguration, urolift x 4  per Dr. Jessi Lee on 01/31/023. Pt with urinary retention, dribbling and passing blood clots today. Pt with history of atrial fibrillation, currently on Coumadin. INR-3.71 today. Pt reports he has held his coumadin for the past three days. Pt arrives with casey catheter in place with scant amount of bright red blood. Interval History Status:     Pt is more awake alert today, good attitude    Brief History:     60-year-old male transferred from [facility] secondary to hematuria. He does have a history of recent cystoscopy with bladder biopsy and fulguration, UroLift x4 with Dr. Jessi Lee. This was on 2023. He is on Coumadin for atrial fibrillation with a supratherapeutic INR on admission. Currently is being held his most recent INR is 2.8. His chief complaint on arrival was difficulty in urination and noticing blood in his urine    Past Medical History:   has a past medical history of Atrial fibrillation ([facility] Benign prostatic hyperplasia with urinary frequency, Cancer ([facility] Cardiomyopathy ([facility] CKD (chronic kidney disease), Coronary atherosclerosis, Gout, Morbid obesity with body mass index (BMI) of 45.0 to 49.9 in adult [facility]), Obesity, Pulmonary hypertension ([facility] PVD (peripheral vascular disease) ([facility] Right ventricular dilation, Tricuspid regurgitation, and Type 2 diabetes mellitus with diabetic polyneuropathy ([facility] Social History:   reports that he has never smoked. He has never used smokeless tobacco. He reports that he does not currently use alcohol. He reports that he does not use drugs. Family History:   Family History   Problem Relation Age of Onset    Heart Disease Mother     Breast Cancer Sister     Cancer Brother         prostate       Review of Systems:     12 point ROS negative other than what is noted in brief history  He does have some difficulty with urination and is feeling ill in general  Has also noted blood in his urine    Physical Examination:        Vitals  BP (!) 116/57 Comment: RN Notified  Pulse 64   Temp 98.4 °F (36.9 °C) (Oral)   Resp 18   Ht 5' 11\" (1.803 m)   Wt 298 lb 15.1 oz (135.6 kg)   SpO2 94%   BMI 41.69 kg/m²   Temp (24hrs), Av.2 °F (36.8 °C), Min:97.7 °F (36.5 °C), Max:98.5 °F (36.9 °C)    Recent Labs     23  0755 23  1741 23   POCGLU 139* 73* 127* 127*         Physical Exam  Vitals reviewed. Constitutional:       Appearance: Normal appearance. He is ill-appearing. HENT:      Head: Normocephalic. Eyes:      Extraocular Movements: Extraocular movements intact. Conjunctiva/sclera: Conjunctivae normal.   Cardiovascular:      Rate and Rhythm: Normal rate and regular rhythm. Pulses: Normal pulses. Heart sounds: Normal heart sounds. Pulmonary:      Effort: Pulmonary effort is normal.      Breath sounds: Normal breath sounds. Abdominal:      Comments: Slightly distended abdomen   Neurological:      General: No focal deficit present. Mental Status: He is alert. Psychiatric:         Mood and Affect: Mood normal.         Behavior: Behavior normal.         Thought Content: Thought content normal.         Judgment: Judgment normal.     Alert and oriented had some confusion    Medications:      Allergies:  No Known Allergies    Current Meds:   Scheduled Meds:    cephALEXin  500 mg Oral 2 times per day    epoetin pedro-epbx  8,000 Units IntraVENous Once per day on Mon Wed Fri    [Held by provider] amLODIPine  5 mg Oral Daily    atorvastatin  80 mg Oral Daily    finasteride  5 mg Oral Daily    [Held by provider] glipiZIDE  5 mg Oral QAM AC    [Held by provider] hydrALAZINE  50 mg Oral BID    [Held by provider] lisinopril  20 mg Oral BID    [Held by provider] metFORMIN  1,000 mg Oral Daily with breakfast    vitamin B-12  1,000 mcg Oral Daily    tamsulosin  0.4 mg Oral Daily    [Held by provider] potassium chloride  10 mEq Oral Daily    sodium chloride flush  5-40 mL IntraVENous 2 times per day    metroNIDAZOLE  500 mg Oral 3 times per day    insulin lispro  0-8 Units SubCUTAneous TID WC    insulin lispro  0-4 Units SubCUTAneous Nightly     Continuous Infusions:    sodium chloride      sodium chloride      dextrose      sodium chloride 75 mL/hr at 02/20/23 0435     PRN Meds: sodium chloride, albumin human, heparin (porcine), heparin (porcine), midodrine, bisacodyl, sodium chloride flush, sodium chloride, ondansetron **OR** ondansetron, polyethylene glycol, acetaminophen **OR** acetaminophen, morphine, opium-belladonna, dextrose bolus **OR** dextrose bolus, glucagon (rDNA), dextrose, glucose    Data:     I/O (24Hr): Intake/Output Summary (Last 24 hours) at 2/23/2023 0837  Last data filed at 2/23/2023 9096  Gross per 24 hour   Intake 2225 ml   Output 6300 ml   Net -4075 ml         Labs:  Hematology:  Recent Labs     02/20/23  1551 02/21/23  0543 02/22/23  0623 02/22/23  1650 02/23/23  0500   WBC  --  16.6*  --   --   --    RBC  --  2.44*  --   --   --    HGB 7.7* 7.3*  --  8.0*  --    HCT 26.7* 25.1*  --  25.4*  --    MCV  --  102.9  --   --   --    MCH  --  29.9  --   --   --    MCHC  --  29.1  --   --   --    RDW  --  13.9  --   --   --    PLT  --  277  --   --   --    MPV  --  10.5  --   --   --    INR  --  2.8 1.9  --  1.5       Chemistry:  Recent Labs     02/21/23  0543 02/22/23  0623 02/23/23  0500    137 138   K 5.0 4.9 4.2    103 101   CO2 19* 20 22   GLUCOSE 75 69* 90   BUN 42* 38* 26*   CREATININE 6.03* 5.70* 4.99*   ANIONGAP 15 14 15   LABGLOM 9* 9* 11*   CALCIUM 7.8* 7.8* 7.6*   PHOS 6.1*  --   --        Recent Labs     02/21/23  0543 02/21/23  0749 02/21/23  1121 02/21/23  1807 02/21/23 2029 02/22/23  0755 02/22/23  1741 02/22/23 2036   PROT 6.2*  --   --   --   --   --   --   --    LABALBU 2.9*  --   --   --   --   --   --   --    AST 14  --   --   --   --   --   --   --    ALT 7  --   --   --   --   --   --   --    ALKPHOS 113  --   --   --   --   --   --   --    BILITOT 0.4  --   --   --   --   --   --   --    POCGLU  --    < > 64* 127* 139* 73* 127* 127*    < > = values in this interval not displayed. ABG:No results found for: POCPH, PHART, PH, POCPCO2, COJ2JZY, PCO2, POCPO2, PO2ART, PO2, POCHCO3, OLS5IAB, HCO3, NBEA, PBEA, BEART, BE, THGBART, THB, WNV6HFB, ILDN3CFE, M0KZVJSA, O2SAT, FIO2  No results found for: SPECIAL  No results found for: CULTURE    Radiology:  No results found.     Assessment:        Primary Problem  Clot retention of urine    Active Hospital Problems    Diagnosis Date Noted    Hx of type 2 diabetes mellitus [Z86.39] 02/22/2023     Priority: Medium    Hx of chronic kidney disease [Z87.448] 02/22/2023     Priority: Medium    Stage 3a chronic kidney disease ([facility] [N18.31] 02/21/2023     Priority: Medium    Supratherapeutic INR [R79.1] 02/20/2023     Priority: Medium    Orthostatic hypotension [I95.1] 02/20/2023     Priority: Medium    Acute blood loss anemia: Due to hematuria/bladder cancer [D62] 02/20/2023     Priority: Medium    Urinary retention [R33.9] 02/20/2023     Priority: Medium    Clot retention of urine [R33.8] 02/19/2023     Priority: Medium    Gross hematuria [R31.0] 02/19/2023     Priority: Medium    On continuous oral anticoagulation [Z79.01] 02/19/2023     Priority: Medium    Colitis [K52.9] 02/19/2023     Priority: Medium    Bladder carcinoma ([facility] [C67.9] 02/19/2023     Priority: Medium    MATTI (acute kidney injury) ([facility] [N17.9] 02/19/2023     Priority: Medium    Hyperkalemia [E87.5] 02/19/2023     Priority: Medium    Atrial fibrillation ([facility] [I48.91] 07/01/2022     Priority: Medium    Type 2 diabetes mellitus with diabetic polyneuropathy ([facility] [E11.42]      Priority: Medium         Plan:        OR for cystoscopy today after cancellation yesterday  Continue to hold anticoagulation  Further recommendations from urology to follow regarding surgery or not  If no surgery planned patient can be discharged next 24 to 48 hours  Temp dialysis catheter put in my neprhology yesterday  Worsening cr yesterday, very poor residual kidney function  Bgm adequate watch for hypoglycemia      IP CONSULT TO UROLOGY  IP CONSULT TO HOSPITALIST  IP CONSULT TO UROLOGY  IP CONSULT TO NEPHROLOGY  IP CONSULT TO IV TEAM    Titi Sahu MD  2/23/2023  8:37 AM

## 2023-02-23 NOTE — ANESTHESIA PRE PROCEDURE
Department of Anesthesiology  Preprocedure Note       Name:  Carrie David   Age:  [de-identified] y.o.  :  1940                                          MRN:  [id number]         Date:  2023      Surgeon: Josh Garcia):  Janna Closs., MD    Procedure: Procedure(s):  CYSTOSCOPY EVACUATION OF CLOTS, TUR BLADDER TUMOR    Medications prior to admission:   Prior to Admission medications    Medication Sig Start Date End Date Taking?  Authorizing Provider   cephALEXin (KEFLEX) 500 MG capsule Take 500 mg by mouth 3 times daily 23  Historical Provider, MD   metroNIDAZOLE (FLAGYL) 500 MG tablet Take 500 mg by mouth 3 times daily 23  Historical Provider, MD   bisacodyl (DULCOLAX) 5 MG EC tablet Take 5 mg by mouth daily as needed for Constipation    Historical Provider, MD   warfarin (COUMADIN) 5 MG tablet Take 5 mg by mouth daily Follows coumadin clinic    Historical Provider, MD   amLODIPine (NORVASC) 5 MG tablet Take 5 mg by mouth daily 10/29/21 11/21/22  Historical Provider, MD   atorvastatin (LIPITOR) 80 MG tablet Take 80 mg by mouth daily 21  Historical Provider, MD   finasteride (PROSCAR) 5 MG tablet Take 5 mg by mouth daily 21  Historical Provider, MD   furosemide (LASIX) 40 MG tablet Take 40 mg by mouth daily 22  Historical Provider, MD   glimepiride (AMARYL) 2 MG tablet Take 2 mg by mouth 22  Historical Provider, MD   hydrALAZINE (APRESOLINE) 50 MG tablet Take 50 mg by mouth 2 times daily 21  Historical Provider, MD   lisinopril (PRINIVIL;ZESTRIL) 20 MG tablet Take 20 mg by mouth 2 times daily  21  Historical Provider, MD   metFORMIN (GLUCOPHAGE) 1000 MG tablet Take 1,000 mg by mouth 21  Historical Provider, MD   potassium chloride (KLOR-CON M) 10 MEQ extended release tablet TAKE 1 TABLET (10 MEQ TOTAL) BY MOUTH DAILY WITH FOOD 21  Historical Provider, MD   vitamin B-12 (CYANOCOBALAMIN) 1000 MCG tablet Take 1,000 mcg by mouth daily    Historical Provider, MD   tamsulosin (FLOMAX) 0.4 MG capsule take 1 capsule by mouth once daily 5/20/19   Holly Pool MD       Current medications:    No current facility-administered medications for this visit. No current outpatient medications on file.      Facility-Administered Medications Ordered in Other Visits   Medication Dose Route Frequency Provider Last Rate Last Admin    0.9 % sodium chloride infusion   IntraVENous PRN Antwan Ridley MD        albumin human 25 % IV solution 25 g  25 g IntraVENous PRN Benedict Diaz MD        cephALEXin (KEFLEX) capsule 500 mg  500 mg Oral 2 times per day Antwan Ridley MD   500 mg at 02/22/23 1957    epoetin pedro-epbx (RETACRIT) injection 8,000 Units  8,000 Units IntraVENous Once per day on Mon Wed Fri Jamison Pringle MD   8,000 Units at 02/21/23 1728    heparin (porcine) injection 1,200 Units  1,200 Units IntraCATHeter PRN Jamison Pringle MD   1,200 Units at 02/22/23 1510    heparin (porcine) injection 1,300 Units  1,300 Units IntraCATHeter PRN Jamison Pringle MD   1,300 Units at 02/22/23 1510    midodrine (PROAMATINE) tablet 2.5 mg  2.5 mg Oral TID PRN Starlette Deis, DO   2.5 mg at 02/22/23 1039    bisacodyl (DULCOLAX) EC tablet 5 mg  5 mg Oral Daily PRN Starlette Deis, DO        [Held by provider] amLODIPine (NORVASC) tablet 5 mg  5 mg Oral Daily Starlette Deis, DO   5 mg at 02/20/23 1036    atorvastatin (LIPITOR) tablet 80 mg  80 mg Oral Daily Makeda D Delgrosso, APRN - CNP   80 mg at 02/22/23 0900    finasteride (PROSCAR) tablet 5 mg  5 mg Oral Daily Makeda D Delgrosso, APRN - CNP   5 mg at 02/22/23 0900    [Held by provider] glipiZIDE (GLUCOTROL) tablet 5 mg  5 mg Oral QAM AC Makeda D Delgrosso, APRN - CNP   5 mg at 02/19/23 0829    [Held by provider] hydrALAZINE (APRESOLINE) tablet 50 mg  50 mg Oral BID Cherelle Smith DO   50 mg at 02/20/23 1036    [Held by provider] lisinopril (PRINIVIL;ZESTRIL) tablet 20 mg  20 mg Oral BID Makeda ROBERTS Awilda APRN - CNP   20 mg at 02/19/23 0829    [Held by provider] metFORMIN (GLUCOPHAGE) tablet 1,000 mg  1,000 mg Oral Daily with breakfast Timothy Suarezeleni APRN - CNP   1,000 mg at 02/19/23 7555    vitamin B-12 (CYANOCOBALAMIN) tablet 1,000 mcg  1,000 mcg Oral Daily Makeda ALEJANDRA Kaiser APRN - CNP   1,000 mcg at 02/22/23 0900    tamsulosin (FLOMAX) capsule 0.4 mg  0.4 mg Oral Daily Makeda ALEJANDRA [facility] APRN - CNP   0.4 mg at 02/22/23 0900    [Held by provider] potassium chloride (KLOR-CON M) extended release tablet 10 mEq  10 mEq Oral Daily Makeda ALEJANDRA [facility] APRN - CNP   10 mEq at 02/19/23 0830    sodium chloride flush 0.9 % injection 5-40 mL  5-40 mL IntraVENous 2 times per day Makedanga Kaiser APRN - CNP   10 mL at 02/19/23 0830    sodium chloride flush 0.9 % injection 10 mL  10 mL IntraVENous PRN Makeda Kaiser APRN - CNP        0.9 % sodium chloride infusion   IntraVENous PRN Makeda Kaiser APRN - CNP        ondansetron (ZOFRAN-ODT) disintegrating tablet 4 mg  4 mg Oral Q8H PRN Makeda Kaiser APRN - CNP        Or    ondansetron (ZOFRAN) injection 4 mg  4 mg IntraVENous Q6H PRN Makeda Kaiser, APRN - CNP        polyethylene glycol (GLYCOLAX) packet 17 g  17 g Oral Daily PRN Makeda Kaiser APRN - CNP        acetaminophen (TYLENOL) tablet 650 mg  650 mg Oral Q6H PRN Makeda Kaiser, APRN - CNP   650 mg at 02/22/23 1656    Or    acetaminophen (TYLENOL) suppository 650 mg  650 mg Rectal Q6H PRN Makeda Kaiser APRN - CNP        morphine (PF) injection 2 mg  2 mg IntraVENous Q4H PRN MakedaKATT Ga - CNP   2 mg at 02/23/23 0207    opium-belladonna (B&O SUPPRETTES) 16.2-60 MG suppository 60 mg  60 mg Rectal Q8H PRN Kinsey Burton MD   60 mg at 02/21/23 1042    dextrose bolus 10% 125 mL  125 mL IntraVENous PRN Albert Kang DO        Or    dextrose bolus 10% 250 mL  250 mL IntraVENous PRN Nikos Haring Orlop, DO        glucagon (rDNA) injection 1 mg  1 mg SubCUTAneous PRN Nikos Haring Orlop, DO        dextrose 10 % infusion   IntraVENous Continuous PRN Nikos Haring Orlop, DO        metroNIDAZOLE (FLAGYL) tablet 500 mg  500 mg Oral 3 times per day Arline Brandon, DO   500 mg at 02/23/23 0048    insulin lispro (HUMALOG) injection vial 0-8 Units  0-8 Units SubCUTAneous TID  Rico Fordlop, DO        insulin lispro (HUMALOG) injection vial 0-4 Units  0-4 Units SubCUTAneous Nightly Nikos Haring Orlop, DO        glucose chewable tablet 16 g  4 tablet Oral PRN Nikos Haring Orlop, DO        0.9 % sodium chloride infusion   IntraVENous Continuous Nikos Haring Orlop, DO 75 mL/hr at 02/20/23 0435 New Bag at 02/20/23 0435       Allergies:  No Known Allergies    Problem List:    Patient Active Problem List   Diagnosis Code    Atrial fibrillation (HCC) I48.91    Benign prostatic hyperplasia with urinary frequency N40.1, R35.0    Cardiomy[facility] I42.9    Coronary atherosclerosis I25.10    Gout M10.9    Obesity E66.9    PVD (peripheral vascular disease) (HCC) I73.9    Right ventricular dilation I51.7    Tricuspid regurgitation I07.1    Type 2 diabetes mellitus with diabetic polyneuropathy (HCC) E11.42    Malignant neoplasm of overlapping sites of bladder (HCC) C67.8    Clot retention of urine R33.8    Gross hematuria R31.0    On continuous oral anticoagulation Z79.01    Colitis K52.9    Bladder carcinoma (HCC) C67.9    MATTI (acute kidney injury) ([facility] N17.9    Hyperkalemia E87.5    Supratherapeutic INR R79.1    Orthostatic hypotension I95.1    Acute blood loss anemia: Due to hematuria/bladder cancer D62    Urinary retention R33.9    Stage 3a chronic kidney disease (HCC) N18.31    Hx of type 2 diabetes mellitus Z86.39    Hx of chronic kidney disease Z87.448       Past Medical History:        Diagnosis Date    [facility] fibrillation ([facility] 07/01/2022    Benign prostatic hyperplasia with urinary frequency 03/17/2018    Cancer ([facility]     bladder    Cardiomy[facility] 05/22/2019    CKD (chronic kidney disease)     Coronary atherosclerosis 07/01/2022    Gout 08/18/2021    Morbid obesity with body mass index (BMI) of 45.0 to 49.9 in adult [facility]) 07/01/2022    Obesity     Pulmonary hypertension ([facility]     PVD (peripheral vascular disease) ([facility] 05/22/2019    Right ventricular dilation 04/01/2018    Tricuspid regurgitation 04/01/2018    Formatting of this note might be different from the original. mild  Formatting of this note might be different from the original. mild    Type 2 diabetes mellitus with diabetic polyneuropathy ([facility] 07/01/2022       Past Surgical History:        Procedure Laterality Date    BACK SURGERY      three times    COLONOSCOPY N/A     [facility]    COLONOSCOPY W/ BIOPSIES N/A 09/12/2022    diverticulosis, x 4 adenoma polyps removed, done @ [facility] by Dr. John Cobb.  CYSTOSCOPY      CYSTOSCOPY      w bladder biospy    CYSTOSCOPY  09/23/2022    CYSTOSCOPY TRANSURETHRAL RESECTION BLADDER WITH INSTILLATION GEMCITIBINE    CYSTOSCOPY N/A 9/23/2022    CYSTOSCOPY TRANSURETHRAL RESECTION BLADDER WITH INSTILLATION GEMCITIBINE performed by Shanna Olivares MD at [address] [place] [address] IR NONTUNNELED VASCULAR CATHETER  2/21/2023    IR NONTUNNELED VASCULAR CATHETER 2/21/2023 [facility] PROCEDURES    KNEE ARTHROPLASTY Left     TRANSURETHRAL RESECTION OF BLADDER TUMOR         Social History:    Social History     Tobacco Use    Smoking status: Never    Smokeless tobacco: Never   Substance Use Topics    Alcohol use: Not Currently                                Counseling given: Not Answered      Vital Signs (Current): There were no vitals filed for this visit.                                            BP Readings from Last 3 Encounters:   02/22/23 97/65   11/21/22 (!) 150/72   09/24/22 123/66       NPO Status: BMI:   Wt Readings from Last 3 Encounters:   02/22/23 298 lb 15.1 oz (135.6 kg)   11/21/22 (!) 308 lb (139.7 kg)   09/23/22 (!) 311 lb (141.1 kg)     There is no height or weight on file to calculate BMI.    CBC:   Lab Results   Component Value Date/Time    WBC 16.6 02/21/2023 05:43 AM    RBC 2.44 02/21/2023 05:43 AM    HGB 8.0 02/22/2023 04:50 PM    HCT 25.4 02/22/2023 04:50 PM    .9 02/21/2023 05:43 AM    RDW 13.9 02/21/2023 05:43 AM     02/21/2023 05:43 AM       CMP:   Lab Results   Component Value Date/Time     02/23/2023 05:00 AM    K 4.2 02/23/2023 05:00 AM     02/23/2023 05:00 AM    CO2 22 02/23/2023 05:00 AM    BUN 26 02/23/2023 05:00 AM    CREATININE 4.99 02/23/2023 05:00 AM    GFRAA 55 09/24/2022 04:18 AM    LABGLOM 11 02/23/2023 05:00 AM    GLUCOSE 90 02/23/2023 05:00 AM    PROT 6.2 02/21/2023 05:43 AM    CALCIUM 7.6 02/23/2023 05:00 AM    BILITOT 0.4 02/21/2023 05:43 AM    ALKPHOS 113 02/21/2023 05:43 AM    AST 14 02/21/2023 05:43 AM    ALT 7 02/21/2023 05:43 AM       POC Tests:   Recent Labs     02/22/23 2036   POCGLU 127*       Coags:   Lab Results   Component Value Date/Time    PROTIME 15.3 02/23/2023 05:00 AM    INR 1.5 02/23/2023 05:00 AM    APTT 37.2 02/21/2023 11:29 AM       HCG (If Applicable): No results found for: PREGTESTUR, PREGSERUM, HCG, HCGQUANT     ABGs: No results found for: PHART, PO2ART, XUH1FUC, QJY2DQH, BEART, X7YJCAOH     Type & Screen (If Applicable):  No results found for: LABABO, LABRH    Drug/Infectious Status (If Applicable):  Lab Results   Component Value Date/Time    HEPCAB NONREACTIVE 02/21/2023 11:29 AM       COVID-19 Screening (If Applicable): No results found for: COVID19        Anesthesia Evaluation  Patient summary reviewed and Nursing notes reviewed no history of anesthetic complications:   Airway: Mallampati: III          Dental:    (+) edentulous Pulmonary:normal exam  breath sounds clear to auscultation  (+) shortness of breath:  sleep apnea:                             Cardiovascular:  Exercise tolerance: no interval change,   (+) CAD: no interval change, dysrhythmias: atrial fibrillation,       ECG reviewed  Rhythm: regular  Rate: normal  Echocardiogram reviewed               ROS comment: Cardiomyopathy     Neuro/Psych:   (+) neuromuscular disease:,             GI/Hepatic/Renal:   (+) renal disease: CRI, no interval change and ESRD, morbid obesity         ROS comment: Bladder tumor    Patient just started on dialysis this week because of increasing Cr. Endo/Other:    (+) DiabetesType II DM, no interval change, , blood dyscrasia: anemia:., malignancy/cancer. ROS comment: Gout Abdominal:   (+) obese,     Abdomen: soft. Vascular:   + PVD, aortic or cerebral, . Other Findings:             Anesthesia Plan      general     ASA 4     (Glide scope    I explained my concerns about the patient's perioperative risk for respiratory complications (including post-op mechanical ventilation). The patient (along with son and daughter) wish to proceed.)  Induction: intravenous. MIPS: Postoperative opioids intended, Postoperative trial extubation and Postoperative ventilation. Anesthetic plan and risks discussed with patient. Use of blood products discussed with patient whom consented to blood products. Plan discussed with CRNA.                     Phong Perez MD   2/23/2023

## 2023-02-23 NOTE — PROGRESS NOTES
Urology Progress Note      Subjective: Jewel Kyle is a [de-identified] y.o. male. His/Her current Diet is: Diet NPO Exceptions are: Sips of Water with Meds. Since the previous note, the patient reports the following:  Required irrigation twice in the past 24 hours even though he is on CBI  This AM urine was pink on moderate gtt CBI  Received another session of dialysis yesterday   Received 1 unit PRBC for hemoglobin to 7.3, after transfusion Hgb increased to 8.0  No fevers or chills. No nausea or vomiting. Pain Controlled. Vitals and Labs:  Vitals:    02/22/23 1510 02/22/23 1515 02/22/23 2033 02/23/23 0237   BP: (!) 123/59 (!) 141/57 97/65    Pulse: 64 62 51    Resp: 18 18 18 18   Temp: 98.1 °F (36.7 °C) 98.1 °F (36.7 °C) 98.5 °F (36.9 °C)    TempSrc: Oral  Oral    SpO2: 95% 96% (!) 85%    Weight:  298 lb 15.1 oz (135.6 kg)     Height:         I/O last 3 completed shifts: In: 6087 [P.O.:300; I.V.:975; Blood:350]  Out: 2550 [Urine:2350]    Recent Labs     02/20/23  1551 02/21/23  0543 02/22/23  1650   WBC  --  16.6*  --    HGB 7.7* 7.3* 8.0*   HCT 26.7* 25.1* 25.4*   MCV  --  102.9  --    PLT  --  277  --        Recent Labs     02/21/23  0543 02/22/23  0623 02/23/23  0500    137 138   K 5.0 4.9 4.2    103 101   CO2 19* 20 22   PHOS 6.1*  --   --    BUN 42* 38* 26*   CREATININE 6.03* 5.70* 4.99*         No results for input(s): COLORU, PHUR, LABCAST, WBCUA, RBCUA, MUCUS, TRICHOMONAS, YEAST, BACTERIA, CLARITYU, SPECGRAV, LEUKOCYTESUR, UROBILINOGEN, BILIRUBINUR, BLOODU in the last 72 hours. Invalid input(s): NITRATE, GLUCOSEUKETONESUAMORPHOUS    Physical Exam:  NAD  A/O x 3  RRR  No accessory muscles of inspiration  Abdomen soft, non-tender, non-distended. No CVA tenderness.   Mason pink urine output on moderate CBI    Impression:  [de-identified] yo male with  Clot retention  Atrial fibrillation on coumadin  Recent urolift implantation on 1/31/2023  History of bladder cancer s/p TURBT in 9/2022  Patient Active Problem List   Diagnosis    Atrial fibrillation (HCC)    Benign prostatic hyperplasia with urinary frequency    Cardiomyopathy ([facility] [address]    Coronary atherosclerosis    Gout    Obesity    PVD (peripheral vascular disease) (HCC)    Right ventricular dilation    Tricuspid regurgitation    Type 2 diabetes mellitus with diabetic polyneuropathy (HCC)    Malignant neoplasm of overlapping sites of bladder (HCC)    Clot retention of urine    Gross hematuria    On continuous oral anticoagulation    Colitis    Bladder carcinoma (HCC)    MATTI (acute kidney injury) (HCC)    Hyperkalemia    Supratherapeutic INR    Orthostatic hypotension    Acute blood loss anemia: Due to hematuria/bladder cancer    Urinary retention    Stage 3a chronic kidney disease (HCC)    Hx of type 2 diabetes mellitus    Hx of chronic kidney disease       Plan:  NPO    OR for cystoscopy, clot evacuation and fulguration, possible TURBT today     Hold anticoagulation if OK per primary team     Monitor Hemoglobin, transfuse as needed for Hgb < 7    Monitor creatinine, appreciate nephrology recommendations      Osiris Davis MD  7:10 AM 2/23/2023

## 2023-02-23 NOTE — ACP (ADVANCE CARE PLANNING)
Advance Care Planning     Advance Care Planning Activator (Inpatient)  Conversation Note      Date of ACP Conversation: 2/23/2023     Conversation Conducted with: Patient with Decision Making Capacity    ACP Activator: Jack Burrell Decision Maker:     Current Designated Health Care Decision Maker:         Care Preferences    Ventilation: \"If you were in your present state of health and suddenly became very ill and were unable to breathe on your own, what would your preference be about the use of a ventilator (breathing machine) if it were available to you? \"      Would the patient desire the use of ventilator (breathing machine)?: yes    \"If your health worsens and it becomes clear that your chance of recovery is unlikely, what would your preference be about the use of a ventilator (breathing machine) if it were available to you? \"     Would the patient desire the use of ventilator (breathing machine)?: Yes      Resuscitation  \"CPR works best to restart the heart when there is a sudden event, like a heart attack, in someone who is otherwise healthy. Unfortunately, CPR does not typically restart the heart for people who have serious health conditions or who are very sick. \"    \"In the event your heart stopped as a result of an underlying serious health condition, would you want attempts to be made to restart your heart (answer \"yes\" for attempt to resuscitate) or would you prefer a natural death (answer \"no\" for do not attempt to resuscitate)? \" yes       [] Yes   [] No   Educated Patient / Celso Must regarding differences between Advance Directives and portable DNR orders.     Length of ACP Conversation in minutes:      Conversation Outcomes:  ACP discussion completed    Follow-up plan:    [] Schedule follow-up conversation to continue planning  [] Referred individual to Provider for additional questions/concerns   [] Advised patient/agent/surrogate to review completed ACP document and update if needed with changes in condition, patient preferences or care setting    [] This note routed to one or more involved healthcare providers

## 2023-02-23 NOTE — PLAN OF CARE
Problem: Discharge Planning  Goal: Discharge to home or other facility with appropriate resources  2/23/2023 0122 by Yu Iyer RN  Outcome: Progressing  2/22/2023 1710 by Cheng Stone RN  Outcome: Progressing     Problem: Skin/Tissue Integrity  Goal: Absence of new skin breakdown  Description: 1. Monitor for areas of redness and/or skin breakdown  2. Assess vascular access sites hourly  3. Every 4-6 hours minimum:  Change oxygen saturation probe site  4. Every 4-6 hours:  If on nasal continuous positive airway pressure, respiratory therapy assess nares and determine need for appliance change or resting period.   2/23/2023 0122 by Yu Iyer RN  Outcome: Progressing  2/22/2023 1710 by Cheng Stone RN  Outcome: Progressing     Problem: Safety - Adult  Goal: Free from fall injury  2/23/2023 0122 by Yu Iyer RN  Outcome: Progressing  2/22/2023 1710 by Cheng Stone RN  Outcome: Progressing     Problem: Pain  Goal: Verbalizes/displays adequate comfort level or baseline comfort level  2/23/2023 0122 by Yu Iyer RN  Outcome: Progressing  2/22/2023 1710 by Cheng Stone RN  Outcome: Progressing     Problem: Chronic Conditions and Co-morbidities  Goal: Patient's chronic conditions and co-morbidity symptoms are monitored and maintained or improved  2/23/2023 0122 by Yu Iyer RN  Outcome: Progressing  2/22/2023 1710 by Cheng Stone RN  Outcome: Progressing     Problem: Nutrition Deficit:  Goal: Optimize nutritional status  2/23/2023 0122 by Yu Iyer RN  Outcome: Progressing  2/22/2023 1710 by Cheng Stone RN  Outcome: Progressing     Problem: ABCDS Injury Assessment  Goal: Absence of physical injury  2/23/2023 0122 by Yu Iyer RN  Outcome: Progressing  2/22/2023 1710 by Cheng Stone RN  Outcome: Progressing

## 2023-02-23 NOTE — PROGRESS NOTES
SKIN TEAR FROM IV TAPE OVER LEFT, ANTERIOR FOREARM-  [facility] NOTIFIED - DRESSED WITH BACITRACIN OINTMENT, 4X4 GAUZE AND PAPER TAPE

## 2023-02-23 NOTE — PROGRESS NOTES
Nephrology Progress Note      SUBJECTIVE      No acute issues overnight   patient seen and examined bedside  Reports feeling okay, denies any new symptoms  Had 1 dialysis session yesterday which ran for about 180 minutes, net removal 400 cc, during the procedure patient was hypotensive was given midodrine, was given blood transfusion as well during the hemodialysis  Has remained afebrile, heart rate in the 60s, blood pressure on the lower side, saturating well on room air  Labs this morning showed sodium 130, potassium 4.2, chloride 101, bicarb 22, BUN 26, creatinine 4.99, anion gap 15, hemoglobin 7.2  Urine output charted 2.5 L last 24 hours  Urine color better today, on CBI, urology following    OBJECTIVE      CURRENT TEMPERATURE:  Temp: 98.4 °F (36.9 °C)  MAXIMUM TEMPERATURE OVER 24HRS:  Temp (24hrs), Av.2 °F (36.8 °C), Min:97.7 °F (36.5 °C), Max:98.5 °F (36.9 °C)    CURRENT RESPIRATORY RATE:  Resp: 18  CURRENT PULSE:  Heart Rate: 67  CURRENT BLOOD PRESSURE:  BP: 121/65  24HR BLOOD PRESSURE RANGE:  Systolic (32BGZ), VFZ:873 , Min:97 , RV   ; Diastolic (59VPY), TBY:22, Min:48, Max:71    24HR INTAKE/OUTPUT:    Intake/Output Summary (Last 24 hours) at 2023 1135  Last data filed at 2023 6673  Gross per 24 hour   Intake 2235 ml   Output 2500 ml   Net -265 ml       PHYSICAL EXAM      GENERAL APPEARANCE:Awake, alert, in no acute distress  SKIN: warm   EYES: conjunctivae normal  NECK:  JVD: None   PULMONARY: Clear breath sounds bilaterally  CADRDIOVASCULAR: Normal heart sounds  ABDOMEN: Soft, nontender  EXTREMITIES: No pedal edema    CURRENT MEDICATIONS      0.9 % sodium chloride infusion, PRN  albumin human 25 % IV solution 25 g, PRN  cephALEXin (KEFLEX) capsule 500 mg, 2 times per day  epoetin pedro-epbx (RETACRIT) injection 8,000 Units, Once per day on   heparin (porcine) injection 1,200 Units, PRN  heparin (porcine) injection 1,300 Units, PRN  midodrine (PROAMATINE) tablet 2.5 mg, TID PRN  bisacodyl (DULCOLAX) EC tablet 5 mg, Daily PRN  [Held by provider] amLODIPine (NORVASC) tablet 5 mg, Daily  atorvastatin (LIPITOR) tablet 80 mg, Daily  finasteride (PROSCAR) tablet 5 mg, Daily  [Held by provider] glipiZIDE (GLUCOTROL) tablet 5 mg, QAM AC  [Held by provider] hydrALAZINE (APRESOLINE) tablet 50 mg, BID  [Held by provider] lisinopril (PRINIVIL;ZESTRIL) tablet 20 mg, BID  [Held by provider] metFORMIN (GLUCOPHAGE) tablet 1,000 mg, Daily with breakfast  vitamin B-12 (CYANOCOBALAMIN) tablet 1,000 mcg, Daily  tamsulosin (FLOMAX) capsule 0.4 mg, Daily  [Held by provider] potassium chloride (KLOR-CON M) extended release tablet 10 mEq, Daily  sodium chloride flush 0.9 % injection 5-40 mL, 2 times per day  sodium chloride flush 0.9 % injection 10 mL, PRN  0.9 % sodium chloride infusion, PRN  ondansetron (ZOFRAN-ODT) disintegrating tablet 4 mg, Q8H PRN   Or  ondansetron (ZOFRAN) injection 4 mg, Q6H PRN  polyethylene glycol (GLYCOLAX) packet 17 g, Daily PRN  acetaminophen (TYLENOL) tablet 650 mg, Q6H PRN   Or  acetaminophen (TYLENOL) suppository 650 mg, Q6H PRN  morphine (PF) injection 2 mg, Q4H PRN  opium-belladonna (B&O SUPPRETTES) 16.2-60 MG suppository 60 mg, Q8H PRN  dextrose bolus 10% 125 mL, PRN   Or  dextrose bolus 10% 250 mL, PRN  glucagon (rDNA) injection 1 mg, PRN  dextrose 10 % infusion, Continuous PRN  metroNIDAZOLE (FLAGYL) tablet 500 mg, 3 times per day  insulin lispro (HUMALOG) injection vial 0-8 Units, TID WC  insulin lispro (HUMALOG) injection vial 0-4 Units, Nightly  glucose chewable tablet 16 g, PRN  0.9 % sodium chloride infusion, Continuous          LABS      CBC:   Recent Labs     02/21/23  0543 02/22/23  1650 02/23/23  0835   WBC 16.6*  --   --    RBC 2.44*  --   --    HGB 7.3* 8.0* 7.2*   HCT 25.1* 25.4* 23.6*   .9  --   --    MCH 29.9  --   --    MCHC 29.1  --   --    RDW 13.9  --   --      --   --    MPV 10.5  --   --       BMP:   Recent Labs     02/21/23  0543 02/22/23  0623 02/23/23  0500    137 138   K 5.0 4.9 4.2    103 101   CO2 19* 20 22   BUN 42* 38* 26*   CREATININE 6.03* 5.70* 4.99*   GLUCOSE 75 69* 90   CALCIUM 7.8* 7.8* 7.6*        PHOSPHORUS:    Recent Labs     02/21/23  0543   PHOS 6.1*     ALBUMIN:   Recent Labs     02/21/23  0543   LABALBU 2.9*     JONATHAN:   Lab Results   Component Value Date    JONATHAN EQUIVOCAL (A) 02/20/2023       SPEP:   Lab Results   Component Value Date/Time    PROT 6.2 02/21/2023 05:43 AM    PATH ELECTRONICALLY SIGNED. Irlanda Calloway M.D. 02/20/2023 04:16 PM     UPEP: No results found for: TPU   HEPBSAG:  Lab Results   Component Value Date/Time    HEPBSAG NONREACTIVE 02/21/2023 11:29 AM     HEPCAB:  Lab Results   Component Value Date/Time    HEPCAB NONREACTIVE 02/21/2023 11:29 AM     C3:   Lab Results   Component Value Date    C3 127 02/20/2023     C4:   Lab Results   Component Value Date    C4 31 02/20/2023     MPO ANCA:   Lab Results   Component Value Date/Time    MPO <0.3 02/20/2023 04:16 PM    .  PR3 ANCA:    Lab Results   Component Value Date/Time    PR3 <0.7 02/20/2023 04:16 PM         RADIOLOGY      Reviewed as available. ASSESSMENT      #1 acute kidney injury secondary to obstructive uropathy related to bladder clots and complicated further by hypotension/concomitant diuretic and ACE inhibitor use - initiated dialysis on 2/21/2023, had 2 sessions  #2 metabolic acidosis - improving  #3 chronic kidney disease stage III with baseline creatinine 1.4-1.6. Never seen a nephrologist in the past  #4 hospitalized for evaluation of hematuria with background history of bladder tumor status post TURBT in the past.  Awaiting cystoscopy. Also recently had UroLift procedure late January  #5 type 2 diabetes  #6 history of BPH  #7 anemia    PLAN      1. We will hold off on dialysis today, continue maintenance IV fluids for now as the patient is n.p.o. for procedure  2. Strict intake and output monitoring  3.   Close monitoring of renal function and electrolytes  4. BMP in the a.m.  5.  Hematuria management as per urology  6. Monitor H&H closely  7. Monitor hemodynamics closely  8. We will follow      Drewrosetta Flex  PGY-2  Internal Medicine  [facility]   11:37 AM 2/23/2023   Attending Physician Statement  I have discussed the care of Jake Webster, including pertinent history and exam findings with the resident/fellow. I have reviewed the key elements of all parts of the encounter with the resident/fellow. I have seen and examined the patient with the resident/fellow. I agree with the assessment and plan and status of the problem list as documented.     On going issue with hematuria   Scheduled for cysto this afternoon  No dialysis planned for today , will evaluate again in am  .  Leonor Chen MD , MD

## 2023-02-24 ENCOUNTER — APPOINTMENT (OUTPATIENT)
Dept: DIALYSIS | Age: 83
DRG: 665 | End: 2023-02-24
Payer: MEDICARE

## 2023-02-24 ENCOUNTER — ANESTHESIA EVENT (OUTPATIENT)
Dept: OPERATING ROOM | Age: 83
End: 2023-02-24
Payer: MEDICARE

## 2023-02-24 ENCOUNTER — ANESTHESIA (OUTPATIENT)
Dept: OPERATING ROOM | Age: 83
End: 2023-02-24
Payer: MEDICARE

## 2023-02-24 LAB
ANION GAP SERPL CALCULATED.3IONS-SCNC: 13 MMOL/L (ref 9–17)
BUN SERPL-MCNC: 36 MG/DL (ref 8–23)
CALCIUM SERPL-MCNC: 7.4 MG/DL (ref 8.6–10.4)
CHLORIDE SERPL-SCNC: 103 MMOL/L (ref 98–107)
CO2 SERPL-SCNC: 23 MMOL/L (ref 20–31)
CREAT SERPL-MCNC: 5.32 MG/DL (ref 0.7–1.2)
GFR SERPL CREATININE-BSD FRML MDRD: 10 ML/MIN/1.73M2
GLUCOSE BLD-MCNC: 107 MG/DL (ref 75–110)
GLUCOSE BLD-MCNC: 186 MG/DL (ref 75–110)
GLUCOSE BLD-MCNC: 198 MG/DL (ref 75–110)
GLUCOSE BLD-MCNC: 78 MG/DL (ref 75–110)
GLUCOSE BLD-MCNC: 88 MG/DL (ref 75–110)
GLUCOSE BLD-MCNC: 96 MG/DL (ref 75–110)
GLUCOSE SERPL-MCNC: 90 MG/DL (ref 70–99)
HCT VFR BLD AUTO: 23.5 % (ref 40.7–50.3)
HCT VFR BLD AUTO: 27.9 % (ref 40.7–50.3)
HGB BLD-MCNC: 7 G/DL (ref 13–17)
HGB BLD-MCNC: 8.6 G/DL (ref 13–17)
MCH RBC QN AUTO: 29.7 PG (ref 25.2–33.5)
MCHC RBC AUTO-ENTMCNC: 29.8 G/DL (ref 28.4–34.8)
MCV RBC AUTO: 99.6 FL (ref 82.6–102.9)
NRBC AUTOMATED: 0 PER 100 WBC
PDW BLD-RTO: 14.3 % (ref 11.8–14.4)
PLATELET # BLD AUTO: 219 K/UL (ref 138–453)
PMV BLD AUTO: 10.3 FL (ref 8.1–13.5)
POTASSIUM SERPL-SCNC: 4.3 MMOL/L (ref 3.7–5.3)
RBC # BLD: 2.36 M/UL (ref 4.21–5.77)
SODIUM SERPL-SCNC: 139 MMOL/L (ref 135–144)
WBC # BLD AUTO: 11.3 K/UL (ref 3.5–11.3)

## 2023-02-24 PROCEDURE — 7100000000 HC PACU RECOVERY - FIRST 15 MIN: Performed by: UROLOGY

## 2023-02-24 PROCEDURE — 6370000000 HC RX 637 (ALT 250 FOR IP): Performed by: STUDENT IN AN ORGANIZED HEALTH CARE EDUCATION/TRAINING PROGRAM

## 2023-02-24 PROCEDURE — 3700000001 HC ADD 15 MINUTES (ANESTHESIA): Performed by: UROLOGY

## 2023-02-24 PROCEDURE — 86900 BLOOD TYPING SEROLOGIC ABO: CPT

## 2023-02-24 PROCEDURE — 2580000003 HC RX 258: Performed by: UROLOGY

## 2023-02-24 PROCEDURE — 2580000003 HC RX 258: Performed by: NURSE PRACTITIONER

## 2023-02-24 PROCEDURE — 85014 HEMATOCRIT: CPT

## 2023-02-24 PROCEDURE — 7100000001 HC PACU RECOVERY - ADDTL 15 MIN: Performed by: UROLOGY

## 2023-02-24 PROCEDURE — 88307 TISSUE EXAM BY PATHOLOGIST: CPT

## 2023-02-24 PROCEDURE — 97535 SELF CARE MNGMENT TRAINING: CPT

## 2023-02-24 PROCEDURE — 3700000000 HC ANESTHESIA ATTENDED CARE: Performed by: UROLOGY

## 2023-02-24 PROCEDURE — 36415 COLL VENOUS BLD VENIPUNCTURE: CPT

## 2023-02-24 PROCEDURE — 99232 SBSQ HOSP IP/OBS MODERATE 35: CPT | Performed by: STUDENT IN AN ORGANIZED HEALTH CARE EDUCATION/TRAINING PROGRAM

## 2023-02-24 PROCEDURE — P9016 RBC LEUKOCYTES REDUCED: HCPCS

## 2023-02-24 PROCEDURE — 3600000004 HC SURGERY LEVEL 4 BASE: Performed by: UROLOGY

## 2023-02-24 PROCEDURE — 80048 BASIC METABOLIC PNL TOTAL CA: CPT

## 2023-02-24 PROCEDURE — 85018 HEMOGLOBIN: CPT

## 2023-02-24 PROCEDURE — 2500000003 HC RX 250 WO HCPCS

## 2023-02-24 PROCEDURE — 2500000003 HC RX 250 WO HCPCS: Performed by: NURSE ANESTHETIST, CERTIFIED REGISTERED

## 2023-02-24 PROCEDURE — 2580000003 HC RX 258: Performed by: STUDENT IN AN ORGANIZED HEALTH CARE EDUCATION/TRAINING PROGRAM

## 2023-02-24 PROCEDURE — 6360000002 HC RX W HCPCS: Performed by: NURSE ANESTHETIST, CERTIFIED REGISTERED

## 2023-02-24 PROCEDURE — 0TBB8ZZ EXCISION OF BLADDER, VIA NATURAL OR ARTIFICIAL OPENING ENDOSCOPIC: ICD-10-PCS | Performed by: UROLOGY

## 2023-02-24 PROCEDURE — 3600000014 HC SURGERY LEVEL 4 ADDTL 15MIN: Performed by: UROLOGY

## 2023-02-24 PROCEDURE — 6360000002 HC RX W HCPCS: Performed by: STUDENT IN AN ORGANIZED HEALTH CARE EDUCATION/TRAINING PROGRAM

## 2023-02-24 PROCEDURE — 2709999900 HC NON-CHARGEABLE SUPPLY: Performed by: UROLOGY

## 2023-02-24 PROCEDURE — 90935 HEMODIALYSIS ONE EVALUATION: CPT

## 2023-02-24 PROCEDURE — 82947 ASSAY GLUCOSE BLOOD QUANT: CPT

## 2023-02-24 PROCEDURE — 85027 COMPLETE CBC AUTOMATED: CPT

## 2023-02-24 PROCEDURE — 6360000002 HC RX W HCPCS

## 2023-02-24 PROCEDURE — 1200000000 HC SEMI PRIVATE

## 2023-02-24 RX ORDER — MAGNESIUM HYDROXIDE 1200 MG/15ML
LIQUID ORAL PRN
Status: DISCONTINUED | OUTPATIENT
Start: 2023-02-24 | End: 2023-02-24 | Stop reason: ALTCHOICE

## 2023-02-24 RX ORDER — 0.9 % SODIUM CHLORIDE 0.9 %
500 INTRAVENOUS SOLUTION INTRAVENOUS ONCE
Status: COMPLETED | OUTPATIENT
Start: 2023-02-24 | End: 2023-02-24

## 2023-02-24 RX ORDER — LIDOCAINE HYDROCHLORIDE 10 MG/ML
INJECTION, SOLUTION EPIDURAL; INFILTRATION; INTRACAUDAL; PERINEURAL PRN
Status: DISCONTINUED | OUTPATIENT
Start: 2023-02-24 | End: 2023-02-24 | Stop reason: SDUPTHER

## 2023-02-24 RX ORDER — EPHEDRINE SULFATE/0.9% NACL/PF 50 MG/5 ML
SYRINGE (ML) INTRAVENOUS PRN
Status: DISCONTINUED | OUTPATIENT
Start: 2023-02-24 | End: 2023-02-24 | Stop reason: SDUPTHER

## 2023-02-24 RX ORDER — FENTANYL CITRATE 50 UG/ML
INJECTION, SOLUTION INTRAMUSCULAR; INTRAVENOUS PRN
Status: DISCONTINUED | OUTPATIENT
Start: 2023-02-24 | End: 2023-02-24 | Stop reason: SDUPTHER

## 2023-02-24 RX ORDER — DEXAMETHASONE SODIUM PHOSPHATE 10 MG/ML
INJECTION INTRAMUSCULAR; INTRAVENOUS PRN
Status: DISCONTINUED | OUTPATIENT
Start: 2023-02-24 | End: 2023-02-24 | Stop reason: SDUPTHER

## 2023-02-24 RX ORDER — ROCURONIUM BROMIDE 10 MG/ML
INJECTION, SOLUTION INTRAVENOUS PRN
Status: DISCONTINUED | OUTPATIENT
Start: 2023-02-24 | End: 2023-02-24 | Stop reason: SDUPTHER

## 2023-02-24 RX ORDER — SODIUM CHLORIDE 9 MG/ML
INJECTION, SOLUTION INTRAVENOUS PRN
Status: DISCONTINUED | OUTPATIENT
Start: 2023-02-24 | End: 2023-02-28 | Stop reason: HOSPADM

## 2023-02-24 RX ORDER — MAGNESIUM HYDROXIDE 1200 MG/15ML
LIQUID ORAL CONTINUOUS PRN
Status: COMPLETED | OUTPATIENT
Start: 2023-02-24 | End: 2023-02-24

## 2023-02-24 RX ORDER — PHENYLEPHRINE HCL IN 0.9% NACL 0.5 MG/5ML
SYRINGE (ML) INTRAVENOUS PRN
Status: DISCONTINUED | OUTPATIENT
Start: 2023-02-24 | End: 2023-02-24 | Stop reason: SDUPTHER

## 2023-02-24 RX ORDER — ONDANSETRON 2 MG/ML
INJECTION INTRAMUSCULAR; INTRAVENOUS PRN
Status: DISCONTINUED | OUTPATIENT
Start: 2023-02-24 | End: 2023-02-24 | Stop reason: SDUPTHER

## 2023-02-24 RX ORDER — PROPOFOL 10 MG/ML
INJECTION, EMULSION INTRAVENOUS PRN
Status: DISCONTINUED | OUTPATIENT
Start: 2023-02-24 | End: 2023-02-24 | Stop reason: SDUPTHER

## 2023-02-24 RX ADMIN — ACETAMINOPHEN 650 MG: 325 TABLET ORAL at 16:47

## 2023-02-24 RX ADMIN — SODIUM CHLORIDE: 9 INJECTION, SOLUTION INTRAVENOUS at 08:15

## 2023-02-24 RX ADMIN — SUGAMMADEX 500 MG: 100 INJECTION, SOLUTION INTRAVENOUS at 14:24

## 2023-02-24 RX ADMIN — Medication 10 MG: at 13:25

## 2023-02-24 RX ADMIN — ROCURONIUM BROMIDE 30 MG: 10 INJECTION, SOLUTION INTRAVENOUS at 14:04

## 2023-02-24 RX ADMIN — DEXAMETHASONE SODIUM PHOSPHATE 10 MG: 10 INJECTION INTRAMUSCULAR; INTRAVENOUS at 13:35

## 2023-02-24 RX ADMIN — METRONIDAZOLE 500 MG: 500 TABLET, FILM COATED ORAL at 01:00

## 2023-02-24 RX ADMIN — ATORVASTATIN CALCIUM 80 MG: 80 TABLET, FILM COATED ORAL at 08:16

## 2023-02-24 RX ADMIN — Medication 200 MCG: at 13:24

## 2023-02-24 RX ADMIN — CEPHALEXIN 500 MG: 500 CAPSULE ORAL at 08:17

## 2023-02-24 RX ADMIN — TAMSULOSIN HYDROCHLORIDE 0.4 MG: 0.4 CAPSULE ORAL at 08:17

## 2023-02-24 RX ADMIN — HEPARIN SODIUM 1300 UNITS: 1000 INJECTION INTRAVENOUS; SUBCUTANEOUS at 18:54

## 2023-02-24 RX ADMIN — Medication 3000 MG: at 13:36

## 2023-02-24 RX ADMIN — Medication 10 MG: at 13:19

## 2023-02-24 RX ADMIN — FENTANYL CITRATE 100 MCG: 50 INJECTION, SOLUTION INTRAMUSCULAR; INTRAVENOUS at 14:04

## 2023-02-24 RX ADMIN — ACETAMINOPHEN 650 MG: 325 TABLET ORAL at 23:08

## 2023-02-24 RX ADMIN — HEPARIN SODIUM 1200 UNITS: 1000 INJECTION INTRAVENOUS; SUBCUTANEOUS at 18:54

## 2023-02-24 RX ADMIN — ONDANSETRON 4 MG: 2 INJECTION INTRAMUSCULAR; INTRAVENOUS at 14:20

## 2023-02-24 RX ADMIN — CEPHALEXIN 500 MG: 500 CAPSULE ORAL at 21:13

## 2023-02-24 RX ADMIN — LIDOCAINE HYDROCHLORIDE 50 MG: 10 INJECTION, SOLUTION EPIDURAL; INFILTRATION; INTRACAUDAL; PERINEURAL at 13:15

## 2023-02-24 RX ADMIN — Medication 200 MCG: at 13:21

## 2023-02-24 RX ADMIN — FENTANYL CITRATE 50 MCG: 50 INJECTION, SOLUTION INTRAMUSCULAR; INTRAVENOUS at 13:52

## 2023-02-24 RX ADMIN — FINASTERIDE 5 MG: 5 TABLET, FILM COATED ORAL at 08:17

## 2023-02-24 RX ADMIN — SODIUM CHLORIDE, PRESERVATIVE FREE 10 ML: 5 INJECTION INTRAVENOUS at 21:13

## 2023-02-24 RX ADMIN — ACETAMINOPHEN 650 MG: 325 TABLET ORAL at 08:23

## 2023-02-24 RX ADMIN — ROCURONIUM BROMIDE 50 MG: 10 INJECTION, SOLUTION INTRAVENOUS at 13:15

## 2023-02-24 RX ADMIN — PROPOFOL 200 MG: 10 INJECTION, EMULSION INTRAVENOUS at 13:15

## 2023-02-24 RX ADMIN — EPOETIN ALFA-EPBX 8000 UNITS: 4000 INJECTION, SOLUTION INTRAVENOUS; SUBCUTANEOUS at 18:00

## 2023-02-24 RX ADMIN — Medication 1000 MCG: at 08:17

## 2023-02-24 RX ADMIN — SODIUM CHLORIDE 500 ML: 9 INJECTION, SOLUTION INTRAVENOUS at 00:13

## 2023-02-24 RX ADMIN — ROCURONIUM BROMIDE 20 MG: 10 INJECTION, SOLUTION INTRAVENOUS at 13:26

## 2023-02-24 RX ADMIN — FENTANYL CITRATE 50 MCG: 50 INJECTION, SOLUTION INTRAMUSCULAR; INTRAVENOUS at 13:38

## 2023-02-24 RX ADMIN — SODIUM CHLORIDE, PRESERVATIVE FREE 10 ML: 5 INJECTION INTRAVENOUS at 08:12

## 2023-02-24 RX ADMIN — METRONIDAZOLE 500 MG: 500 TABLET, FILM COATED ORAL at 08:16

## 2023-02-24 ASSESSMENT — PAIN DESCRIPTION - LOCATION
LOCATION: HIP;BACK
LOCATION: HIP
LOCATION: HIP;BACK

## 2023-02-24 ASSESSMENT — PAIN SCALES - GENERAL
PAINLEVEL_OUTOF10: 0
PAINLEVEL_OUTOF10: 5
PAINLEVEL_OUTOF10: 3
PAINLEVEL_OUTOF10: 0
PAINLEVEL_OUTOF10: 3
PAINLEVEL_OUTOF10: 0
PAINLEVEL_OUTOF10: 0
PAINLEVEL_OUTOF10: 3
PAINLEVEL_OUTOF10: 5

## 2023-02-24 ASSESSMENT — PAIN - FUNCTIONAL ASSESSMENT: PAIN_FUNCTIONAL_ASSESSMENT: NONE - DENIES PAIN

## 2023-02-24 ASSESSMENT — PAIN DESCRIPTION - DESCRIPTORS
DESCRIPTORS: ACHING;DISCOMFORT
DESCRIPTORS: DISCOMFORT;ACHING

## 2023-02-24 ASSESSMENT — PAIN DESCRIPTION - ORIENTATION: ORIENTATION: RIGHT;LEFT

## 2023-02-24 ASSESSMENT — PAIN DESCRIPTION - FREQUENCY: FREQUENCY: INTERMITTENT

## 2023-02-24 ASSESSMENT — ENCOUNTER SYMPTOMS: SHORTNESS OF BREATH: 1

## 2023-02-24 ASSESSMENT — PAIN DESCRIPTION - ONSET: ONSET: ON-GOING

## 2023-02-24 ASSESSMENT — PAIN DESCRIPTION - PAIN TYPE: TYPE: ACUTE PAIN

## 2023-02-24 NOTE — PROGRESS NOTES
Urology Progress Note      Subjective: Torie Salmon is a [de-identified] y.o. male. His/Her current Diet is: Diet NPO Exceptions are: Sips of Water with Meds. Since the previous note, the patient reports the following:  S/P cysto with clot evacuation/TURP/TURBT yesterday  This AM urine was clear on moderate gtt CBI  No pain  No fevers or chills. No nausea or vomiting. Vitals and Labs:  Vitals:    02/23/23 1904 02/24/23 0000 02/24/23 0345 02/24/23 0551   BP: (!) 102/59 (!) 85/42 (!) 90/38    Pulse: 60 53 50    Resp: 20 18 20    Temp: 97.7 °F (36.5 °C) 97.9 °F (36.6 °C) 97.6 °F (36.4 °C)    TempSrc: Oral Oral Oral    SpO2: 96% 96% 91%    Weight:    (!) 308 lb 3.3 oz (139.8 kg)   Height:         I/O last 3 completed shifts: In: 8158 [P.O.:300; I.V.:1485; Blood:350]  Out: 1514 [Urine:3050; Blood:10]    Recent Labs     02/22/23  1650 02/23/23  0835   HGB 8.0* 7.2*   HCT 25.4* 23.6*     Recent Labs     02/22/23  0623 02/23/23  0500 02/24/23  0616    138 139   K 4.9 4.2 4.3    101 103   CO2 20 22 23   BUN 38* 26* 36*   CREATININE 5.70* 4.99* PENDING         No results for input(s): COLORU, PHUR, LABCAST, WBCUA, RBCUA, MUCUS, TRICHOMONAS, YEAST, BACTERIA, CLARITYU, SPECGRAV, LEUKOCYTESUR, UROBILINOGEN, BILIRUBINUR, BLOODU in the last 72 hours. Invalid input(s): NITRATE, GLUCOSEUKETONESUAMORPHOUS    Physical Exam:  NAD  A/O x 3  RRR  No accessory muscles of inspiration  Abdomen soft, non-tender, non-distended. No CVA tenderness.   Mason pink urine output on moderate CBI    Impression:  [de-identified] yo male with  Clot retention  Atrial fibrillation on coumadin  Recent urolift implantation on 1/31/2023  History of bladder cancer s/p TURBT in 9/2022  Patient Active Problem List   Diagnosis    Atrial fibrillation (HCC)    Benign prostatic hyperplasia with urinary frequency    Cardiomyopathy ([address]    Coronary atherosclerosis    Gout    Obesity    PVD (peripheral vascular disease) (HCC)    Right ventricular dilation    Tricuspid regurgitation    Type 2 diabetes mellitus with diabetic polyneuropathy (HCC)    Malignant neoplasm of overlapping sites of bladder (HCC)    Clot retention of urine    Gross hematuria    On continuous oral anticoagulation    Colitis    Bladder carcinoma (HCC)    MATTI (acute kidney injury) (HCC)    Hyperkalemia    Supratherapeutic INR    Orthostatic hypotension    Acute blood loss anemia: Due to hematuria/bladder cancer    Urinary retention    Stage 3a chronic kidney disease (HCC)    Hx of type 2 diabetes mellitus    Hx of chronic kidney disease       Plan:  Follow up AM labs    NPO    Continue CBI at low gtt    OR for cystoscopy, clot evacuation with fulguration, TURBT today     Hold anticoagulation if OK per primary team     Monitor Hemoglobin, transfuse as needed for Hgb < 7    Monitor creatinine, appreciate nephrology recommendations      Keli Sood MD  6:51 AM 2/24/2023      Unable to complete resection at dome due to anatomy of bladder as scope was not long enough to reach area of concern. Long resectoscope available today with plans for resection and clot evacuation.

## 2023-02-24 NOTE — PLAN OF CARE
Problem: Discharge Planning  Goal: Discharge to home or other facility with appropriate resources  Outcome: Progressing  Flowsheets (Taken 2/24/2023 0800)  Discharge to home or other facility with appropriate resources: Identify barriers to discharge with patient and caregiver     Problem: Skin/Tissue Integrity  Goal: Absence of new skin breakdown  Description: 1. Monitor for areas of redness and/or skin breakdown  2. Assess vascular access sites hourly  3. Every 4-6 hours minimum:  Change oxygen saturation probe site  4. Every 4-6 hours:  If on nasal continuous positive airway pressure, respiratory therapy assess nares and determine need for appliance change or resting period.   Outcome: Progressing     Problem: Safety - Adult  Goal: Free from fall injury  Outcome: Progressing     Problem: Pain  Goal: Verbalizes/displays adequate comfort level or baseline comfort level  Outcome: Progressing     Problem: Chronic Conditions and Co-morbidities  Goal: Patient's chronic conditions and co-morbidity symptoms are monitored and maintained or improved  Outcome: Progressing     Problem: Nutrition Deficit:  Goal: Optimize nutritional status  2/24/2023 1654 by Jono Sutherland RN  Outcome: Progressing  2/24/2023 1453 by Florida Rothman RD  Flowsheets (Taken 2/24/2023 1448)  Nutrient intake appropriate for improving, restoring, or maintaining nutritional needs:   Assess nutritional status and recommend course of action   Monitor oral intake, labs, and treatment plans   Recommend appropriate diets, oral nutritional supplements, and vitamin/mineral supplements     Problem: ABCDS Injury Assessment  Goal: Absence of physical injury  Outcome: Progressing

## 2023-02-24 NOTE — CARE COORDINATION
SW following for HD needs. Referral to [facility]. Awaiting financial clearance via [facility] intake. Will likely be TTS schedule. Await confirmation.

## 2023-02-24 NOTE — PROGRESS NOTES
[facility]  Office: [address], DO, Yves Hood, DO, Namita Wilson, DO, Corina Harmon Blood, DO, Deepa Cuellar MD, Cesia Navarro MD, Sherri Camargo MD, Cristina Adair MD,  Soraida Daniels MD, Jose Angel Penaloza MD, Daniel Brown, DO, Surekha Ford MD,  Manav Soto MD, Dada Sahni MD, Nellie Presley, DO, Krista Gilmore MD, Mag Thomas MD, Giulia Bailey, DO, Bakari Guevara MD, Lennie Dooley MD, Stanley Ch MD, Ronit Samson MD, Martinez Powell, DO, Princess Stephen MD, Vick Altman MD, Lincoln Velásquez, CNP,  Zan Grace, [facility], Kenya Noriega, [facility], Jonelle Allen, CNP,  Imani Grullon, [facility], David Abrams, CNP, Fernando Goldman, CNP, Isaias Matt, CNP, Madonna Wilson, CNP, Jade White, CNP, Karma Wilkins PA-C, Cristina Garcia, CNS, Sue Bojorquez, CNP, Reinaldo Johnsons, [facility]    Progress Note    2/24/2023    8:05 AM    Name:   Yana Sims  MRN:     [id number]     Acct:      [de-identified]   Room:   [address] [place] [id number] Day:  5  Admit Date:  2/19/2023 12:32 AM    PCP:   Evy Seymour  Code Status:  Full Code    Subjective:     C/C:   Chief Complaint   Patient presents with    Hematuria     Transfer from [facility] d/t hematuria. Pt with history of cystoscopy with bladder bioposy and fulguration, urolift x 4  per Dr. Shimon Hoffmann on 01/31/023. Pt with urinary retention, dribbling and passing blood clots today. Pt with history of atrial fibrillation, currently on Coumadin. INR-3.71 today. Pt reports he has held his coumadin for the past three days. Pt arrives with casey catheter in place with scant amount of bright red blood. Interval History Status:     Pt is more awake alert today, good attitude    Brief History:     [de-identified] male transferred from [facility] secondary to hematuria. He does have a history of recent cystoscopy with bladder biopsy and fulguration, UroLift x4 with Dr. Shimon Hoffmann. This was on 2023. He is on Coumadin for atrial fibrillation with a supratherapeutic INR on admission. Currently is being held his most recent INR is 2.8. His chief complaint on arrival was difficulty in urination and noticing blood in his urine    Past Medical History:   has a past medical history of Atrial fibrillation ([facility] Benign prostatic hyperplasia with urinary frequency, Cancer ([facility] Cardiomyopathy ([facility] CKD (chronic kidney disease), Coronary atherosclerosis, Gout, Morbid obesity with body mass index (BMI) of 45.0 to 49.9 in adult [facility]), Obesity, Pulmonary hypertension ([facility] PVD (peripheral vascular disease) ([facility] Right ventricular dilation, Tricuspid regurgitation, and Type 2 diabetes mellitus with diabetic polyneuropathy ([facility] Social History:   reports that he has never smoked. He has never used smokeless tobacco. He reports that he does not currently use alcohol. He reports that he does not use drugs. Family History:   Family History   Problem Relation Age of Onset    Heart Disease Mother     Breast Cancer Sister     Cancer Brother         prostate       Review of Systems:     12 point ROS negative other than what is noted in brief history  He does have some difficulty with urination and is feeling ill in general  Has also noted blood in his urine    Physical Examination:        Vitals  BP (!) 90/38   Pulse 50   Temp 97.6 °F (36.4 °C) (Oral)   Resp 20   Ht 5' 11\" (1.803 m)   Wt (!) 308 lb 3.3 oz (139.8 kg)   SpO2 91%   BMI 42.99 kg/m²   Temp (24hrs), Av.8 °F (36.6 °C), Min:97.1 °F (36.2 °C), Max:98.4 °F (36.9 °C)    Recent Labs     23  1150 23  1427 23  1757 23  1907   POCGLU 89 89 103 108         Physical Exam  Vitals reviewed. Constitutional:       Appearance: Normal appearance. He is ill-appearing. HENT:      Head: Normocephalic. Eyes:      Extraocular Movements: Extraocular movements intact.       Conjunctiva/sclera: Conjunctivae normal.   Cardiovascular:      Rate and Rhythm: Normal rate and regular rhythm. Pulses: Normal pulses. Heart sounds: Normal heart sounds. Pulmonary:      Effort: Pulmonary effort is normal.      Breath sounds: Normal breath sounds. Abdominal:      Comments: Slightly distended abdomen   Neurological:      General: No focal deficit present. Mental Status: He is alert. Psychiatric:         Mood and Affect: Mood normal.         Behavior: Behavior normal.         Thought Content: Thought content normal.         Judgment: Judgment normal.     Alert and oriented had some confusion    Medications:      Allergies:  No Known Allergies    Current Meds:   Scheduled Meds:    cephALEXin  500 mg Oral 2 times per day    epoetin pedro-epbx  8,000 Units IntraVENous Once per day on Mon Wed Fri    [Held by provider] amLODIPine  5 mg Oral Daily    atorvastatin  80 mg Oral Daily    finasteride  5 mg Oral Daily    [Held by provider] glipiZIDE  5 mg Oral QAM AC    [Held by provider] hydrALAZINE  50 mg Oral BID    [Held by provider] lisinopril  20 mg Oral BID    [Held by provider] metFORMIN  1,000 mg Oral Daily with breakfast    vitamin B-12  1,000 mcg Oral Daily    tamsulosin  0.4 mg Oral Daily    [Held by provider] potassium chloride  10 mEq Oral Daily    sodium chloride flush  5-40 mL IntraVENous 2 times per day    metroNIDAZOLE  500 mg Oral 3 times per day    insulin lispro  0-8 Units SubCUTAneous TID WC    insulin lispro  0-4 Units SubCUTAneous Nightly     Continuous Infusions:    sodium chloride      sodium chloride      dextrose      sodium chloride 75 mL/hr at 02/20/23 0435     PRN Meds: oxyCODONE **OR** oxyCODONE, sodium chloride, albumin human, heparin (porcine), heparin (porcine), midodrine, bisacodyl, sodium chloride flush, sodium chloride, ondansetron **OR** ondansetron, polyethylene glycol, acetaminophen **OR** acetaminophen, morphine, opium-belladonna, dextrose bolus **OR** dextrose bolus, glucagon (rDNA), dextrose, glucose    Data:     I/O (24Hr): Intake/Output Summary (Last 24 hours) at 2/24/2023 0805  Last data filed at 2/24/2023 0645  Gross per 24 hour   Intake 510 ml   Output 1010 ml   Net -500 ml         Labs:  Hematology:  Recent Labs     02/22/23  0623 02/22/23  1650 02/23/23  0500 02/23/23  0835 02/24/23  0616   WBC  --   --   --   --  11.3   RBC  --   --   --   --  2.36*   HGB  --  8.0*  --  7.2* 7.0*   HCT  --  25.4*  --  23.6* 23.5*   MCV  --   --   --   --  99.6   MCH  --   --   --   --  29.7   MCHC  --   --   --   --  29.8   RDW  --   --   --   --  14.3   PLT  --   --   --   --  219   MPV  --   --   --   --  10.3   INR 1.9  --  1.5  --   --        Chemistry:  Recent Labs     02/22/23  0623 02/23/23  0500 02/24/23  0616    138 139   K 4.9 4.2 4.3    101 103   CO2 20 22 23   GLUCOSE 69* 90 90   BUN 38* 26* 36*   CREATININE 5.70* 4.99* 5.32*   ANIONGAP 14 15 13   LABGLOM 9* 11* 10*   CALCIUM 7.8* 7.6* 7.4*       Recent Labs     02/22/23  2036 02/23/23  0856 02/23/23  1150 02/23/23  1427 02/23/23  1757 02/23/23  1907   POCGLU 127* 89 89 89 103 108       ABG:No results found for: POCPH, PHART, PH, POCPCO2, GUT8QVN, PCO2, POCPO2, PO2ART, PO2, POCHCO3, UZB2XEE, HCO3, NBEA, PBEA, BEART, BE, THGBART, THB, EED2MNJ, KFEG8UFR, Y3UCQUAL, O2SAT, FIO2  No results found for: SPECIAL  No results found for: CULTURE    Radiology:  No results found.     Assessment:        Primary Problem  Clot retention of urine    Active Hospital Problems    Diagnosis Date Noted    Hx of type 2 diabetes mellitus [Z86.39] 02/22/2023     Priority: Medium    Hx of chronic kidney disease [Z87.448] 02/22/2023     Priority: Medium    Stage 3a chronic kidney disease ([facility] [N18.31] 02/21/2023     Priority: Medium    Supratherapeutic INR [R79.1] 02/20/2023     Priority: Medium    Orthostatic hypotension [I95.1] 02/20/2023     Priority: Medium    Acute blood loss anemia: Due to hematuria/bladder cancer [D62] 02/20/2023     Priority: Medium    Urinary retention [R33.9] 02/20/2023     Priority: Medium    Clot retention of urine [R33.8] 02/19/2023     Priority: Medium    Gross hematuria [R31.0] 02/19/2023     Priority: Medium    On continuous oral anticoagulation [Z79.01] 02/19/2023     Priority: Medium    Colitis [K52.9] 02/19/2023     Priority: Medium    Bladder carcinoma ([facility] [C67.9] 02/19/2023     Priority: Medium    MATTI (acute kidney injury) ([facility] [N17.9] 02/19/2023     Priority: Medium    Hyperkalemia [E87.5] 02/19/2023     Priority: Medium    Atrial fibrillation ([facility] [I48.91] 07/01/2022     Priority: Medium    Type 2 diabetes mellitus with diabetic polyneuropathy (HCC) [E11.42]      Priority: Medium         Plan:        Postoperative day #1 status post cystoscopy with clot evacuation fulguration transurethral resection of bladder tissue and prostate  Continue to hold anticoagulation  Further recommendations from urology to follow regarding surgery or not  If no surgery planned patient can be discharged next 24 to 48 hours  Temp dialysis catheter put in my neprhology yesterday  Worsening cr yesterday, very poor residual kidney function  Bgm adequate watch for hypoglycemia  Hemoglobin this morning 7.0 we will hold blood products; if urology prefers to transfuse they may do so    Back to the OR today as the shape of his bladder -more cylindrical -necessitated a longer instrument for exploration and resection      IP CONSULT TO UROLOGY  IP CONSULT TO HOSPITALIST  IP CONSULT TO UROLOGY  IP CONSULT TO NEPHROLOGY  IP CONSULT TO IV TEAM    Orin Elise MD  2/24/2023  8:05 AM

## 2023-02-24 NOTE — PROGRESS NOTES
Dialysis Time Out  To be done by RN and tech or 2 RNs  Staff Names [id number] [facility] RN    [x]  Identity of the patient using 2 patient identifiers  [x]  Consent for treatment  [x]  Equipment-proper machine and dialyzer  [x]  B-Hep B status  [x]  Orders- to include bath, blood flow, dialyzer, time and fluid removal  [x]  Access-Correct site and in working order  [x]  Time for patient to ask questions.

## 2023-02-24 NOTE — OP NOTE
Operative Note      Patient: Danelle Ag  YOB: 1940  MRN: [id number]    Date of Procedure: 2/24/2023    Pre-Op Diagnosis: Bladder mass [N32.89]    Post-Op Diagnosis:  Bladder mass involving the dome and posterior wall, large > 5 cm        Procedure(s):  CYSTOSCOPY   TRANSURETHRAL RESECTION BLADDER TUMOR , LARGE > 5 CM   EVACUATION OF BLOOD CLOTS  MODIFIER 22 DUE TO INACCESSIBLE LOCATION OF TUMOR IN THE DOME REQUIRING EXTRA LONG LOOP AND CONSTANT SUPRAPUBIC PRESSURE TO REACH THE TUMOR     Surgeon(s):  Jam Tatum MD    Assistant:   Resident: Gemini Selby MD    Anesthesia: General    Estimated Blood Loss (mL): Minimal    Complications: None    Specimens:   ID Type Source Tests Collected by Time Destination   A : BLADDER TUMOR Tissue Bladder SURGICAL PATHOLOGY Jam Tatum MD 2/24/2023 1358        Implants:  * No implants in log *      Drains:   Urinary Catheter 02/24/23 3 Way (Active)       [REMOVED] Urinary Catheter 02/19/23 [place] (Removed)   Catheter Indications Urinary retention (acute or chronic), continuous bladder irrigation or bladder outlet obstruction 02/19/23 0125   Site Assessment Bleeding 02/19/23 0125   Urine Color Bunch 02/19/23 0125   Urine Appearance Clots 02/19/23 0125   Collection Container Standard 02/19/23 0125   Securement Method Securing device (Describe) 02/19/23 0125       [REMOVED] Urinary Catheter 02/19/23 3 Way (Removed)   $ Urethral catheter insertion Inserted for procedure 02/19/23 0250   Catheter Indications Urinary retention (acute or chronic), continuous bladder irrigation or bladder outlet obstruction 02/19/23 0250   Urine Color Bloody 02/19/23 0250       [REMOVED] Urinary Catheter 02/19/23 [address] (Removed)   $ Urethral catheter insertion $ Not inserted for procedure 02/22/23 2033   Catheter Indications Urinary retention (acute or chronic), continuous bladder irrigation or bladder outlet obstruction 02/23/23 1045   Site Assessment Urethral drainage 02/23/23 1045   Urine Color Bunch 02/23/23 1045   Urine Appearance Cloudy 02/23/23 1045   Urine Odor Malodorous 02/23/23 1045   Collection Container Standard 02/23/23 1045   Securement Method Leg strap 02/23/23 1045   Catheter Care  Soap and water 02/23/23 1045   Catheter Best Practices  Drainage tube clipped to bed;Catheter secured to thigh; Bag below bladder;Bag not on floor; Lack of dependent loop in tubing;Drainage bag less than half full 02/23/23 1045   Status Continuous bladder irrigation;Draining 02/23/23 1045   Output (mL) 250 mL 02/22/23 0837       [REMOVED] Urinary Catheter 02/23/23 3 Way (Removed)   $ Urethral catheter insertion Inserted for procedure 02/24/23 0800   Catheter Indications Urinary retention (acute or chronic), continuous bladder irrigation or bladder outlet obstruction 02/24/23 0800   Site Assessment No urethral drainage 02/24/23 0800   Urine Color Pink 02/24/23 0800   Urine Appearance Clear 02/23/23 2000   Collection Container Standard 02/24/23 0800   Securement Method Leg strap 02/23/23 2000   Catheter Care  Soap and water 02/24/23 1003   Catheter Best Practices  Drainage tube clipped to bed;Catheter secured to thigh; Bag below bladder;Bag not on floor; Lack of dependent loop in tubing;Drainage bag less than half full 02/24/23 0800   Status Continuous bladder irrigation;Draining 02/24/23 0800       Findings:   High bladder neck  Blood clots coating surface of bladder tumor diffusely in the posterior wall, posterior base and the dome of the bladder  Successful clot evacuation  Successful transurethral resection of bladder tumors in the posterior wall, base and dome of the bladder with extra long loop constant suprapubic pressure  Hemostasis with clear urine at the end of procedure      Indications: Laurie Mcbride is a 61-year-old male with gross hematuria and clot retention. He underwent cystoscopy, transurethral resection of bladder tumor on 2/23/2023.   However the bladder tumor in the posterior wall and dome could not be reached due to difficult anatomy and hence he is rescheduled for repeat transurethral resection of bladder tumor today. All treatment options were discussed. Risks, benefits, goals, alternatives, possible complications were discussed with patient. Patient elected for above mentioned procedures. Consent was signed. Patient elected to proceed. Details: Patient was brought back to operating room, laid in supine position. EPC cuffs were placed on and functioning prior to induction of anesthesia. Antibiotics were administered. GETA was administered. Patient was positioned in dorsolithotomy position and genitals were prepped and draped in sterile fashion. Time out was performed. We placed visual obturator scope within the urethra and into the bladder. A pan-cystoscopy was performed and showed tumor at the: [facility], posterior wall and posterior base. We switched to a extra long resectoscope. We then resected the clots on the surface of the dome tumors and subsequently resected the tumors until we were able to see muscle. We then fulgurated the floor of the tumors, until there was good hemostasis. This was repeated for the tumors in the posterior wall as well as posterior base of the bladder. The collective surface area of the tumors greater than 5 cm. We then obtained adequate hemostasis. We evacuated the specimen using negative pressure from the cystoscope. This concluded the case. He tolerated the procedure well. We decided  to  leave a casey catheter in place. We decided to do continuous bladder irrigation. Dr. Shimon Hoffmann was present and scrubbed in for all portions of the procedure.      Plan/Follow up:   Patient will be monitored overnight  Possible void trial tomorrow      Electronically signed by Nithya Dwyer MD on 2/24/2023 at 2:37 PM

## 2023-02-24 NOTE — PROGRESS NOTES
Comprehensive Nutrition Assessment    Type and Reason for Visit:  Reassess    Nutrition Recommendations/Plan:   Continue NPO as ordered. Monitor for diet advancement. Continue to monitor intakes, labs, weights, HD, and follow up. Malnutrition Assessment:  Malnutrition Status: At risk for malnutrition (Comment) (02/20/23 1119)    Context:  Social/Environmental Circumstances       Nutrition Assessment:    Chart reviewed for follow up. Patient is s/p cystoscopy, transurethral resection bladder tumor, evacuation of blood clots. Currently NPO, intakes prior to NPO %. New weight obtained 2/24 shows gain in 14 days. HD treatment 2/22, 0mL fluid removed. Transitional planning in place. Nutrition Related Findings:    Labs/Meds reviewed Wound Type: None       Current Nutrition Intake & Therapies:    Average Meal Intake: NPO  Average Supplements Intake: NPO  Diet NPO Exceptions are: Sips of Water with Meds    Anthropometric Measures:  Height: 5' 11\" (180.3 cm)  Ideal Body Weight (IBW): 172 lbs (78 kg)    Current Body Weight: 308 lb 3.3 oz (139.8 kg), 168.7 % IBW.  Weight Source: Bed Scale  Current BMI (kg/m2): 43  Usual Body Weight: 311 lb (141.1 kg) (9/15/22 per EHR)  % Weight Change (Calculated): -6.7  Weight Adjustment For: No Adjustment  BMI Categories: Obese Class 3 (BMI 40.0 or greater)    Estimated Daily Nutrient Needs:  Energy Requirements Based On: Formula  Weight Used for Energy Requirements: Current  Energy (kcal/day): 8717-8052 kcals/day  Weight Used for Protein Requirements: Ideal  Protein (g/day): 115-140 gm/day  Method Used for Fluid Requirements: Other (Comment)  Fluid (ml/day): 2400 mL/day (Mayra Prince) or per MD    Nutrition Diagnosis:   Unintended weight loss related to psychological cause or life stress as evidenced by weight loss (~6.7% loss x past several months)    Nutrition Interventions:   Food and/or Nutrient Delivery: Continue NPO  Nutrition Education/Counseling: No recommendation at this time  Coordination of Nutrition Care: Continue to monitor while inpatient  Plan of Care discussed with: -    Goals:  Previous Goal Met:  (goal set)  Goals: Meet at least 75% of estimated needs, within 7 days, PO intake 50% or greater       Nutrition Monitoring and Evaluation:   Behavioral-Environmental Outcomes: None Identified  Food/Nutrient Intake Outcomes: Food and Nutrient Intake  Physical Signs/Symptoms Outcomes: Biochemical Data, Nutrition Focused Physical Findings, Weight, Fluid Status or Edema    Discharge Planning:     Too soon to determine     Mandi Wilkerson RD  Contact: [phone]

## 2023-02-24 NOTE — ANESTHESIA POSTPROCEDURE EVALUATION
POST- ANESTHESIA EVALUATION       Pt Name: Carrie David  MRN: [id number]  YOB: 1940  Date of evaluation: 2/24/2023  Time:  4:06 PM      BP (!) 116/59   Pulse (!) 39   Temp (!) 96.5 °F (35.8 °C) (Temporal)   Resp 14   Ht 5' 11\" (1.803 m)   Wt (!) 308 lb 3.3 oz (139.8 kg)   SpO2 100%   BMI 42.99 kg/m²      Consciousness Level  Awake  Cardiopulmonary Status  Stable  Pain Adequately Treated YES  Nausea / Vomiting  NO  Adequate Hydration  YES  Anesthesia Related Complications NONE      Electronically signed by Jeff Pimentel MD on 2/24/2023 at 4:06 PM       Department of Anesthesiology  Postprocedure Note    Patient: Carrie David  MRN: [id number]  YOB: 1940  Date of evaluation: 2/24/2023      Procedure Summary     Date: 02/24/23 Room / Location: [address]    Anesthesia Start: 1300 Anesthesia Stop: 0735    Procedure: CYSTOSCOPY TRANSURETHRAL RESECTION BLADDER, EVACUATION OD BLOOD CLOTS Diagnosis:       Bladder mass      (Bladder mass [N32.89])    Surgeons: Janna Closs., MD Responsible Provider: Jeff Pimentel MD    Anesthesia Type: general ASA Status: 4          Anesthesia Type: No value filed.     Mateo Phase I: Mateo Score: 10    Mateo Phase II:        Anesthesia Post Evaluation

## 2023-02-24 NOTE — PROGRESS NOTES
Occupational Therapy  Facility/Department: [facility] OR  Occupational Therapy Daily Treatment Note    Name: Amrit Alcaraz  : 1940  MRN: [id number]  Date of Service: 2023    Discharge Recommendations:  Patient would benefit from continued therapy after discharge    Patient Diagnosis(es): The primary encounter diagnosis was Urinary retention. A diagnosis of MATTI (acute kidney injury) ([address] was also pertinent to this visit. Past Medical History:  has a past medical history of Atrial fibrillation ([address] Benign prostatic hyperplasia with urinary frequency, Cancer ([address] Cardiomyopathy ([address] CKD (chronic kidney disease), Coronary atherosclerosis, Gout, Morbid obesity with body mass index (BMI) of 45.0 to 49.9 in [facility]), Obesity, Pulmonary hypertension ([address] PVD (peripheral vascular disease) ([address] Right ventricular dilation, Tricuspid regurgitation, and Type 2 diabetes mellitus with diabetic polyneuropathy ([address] Past Surgical History:  has a past surgical history that includes back surgery; Knee Arthroplasty (Left); Colonoscopy (N/A); Colonoscopy w/ biopsies (N/A, 2022); Cystoscopy; Finger surgery; Cystoscopy; Transurethral resection of bladder tumor; Cystoscopy (N/A, 2022); IR NONTUNNELED VASCULAR CATHETER > 5 YEARS (2023); and Cystoscopy (2023). Assessment   Performance deficits / Impairments: Decreased functional mobility ; Decreased ADL status; Decreased endurance;Decreased high-level IADLs;Decreased safe awareness;Decreased balance;Decreased strength;Decreased cognition;Decreased fine motor control  Assessment: Patient would benefit from continued acute OT services to address functional deficits through skilled intervention impacting performance and safety with ADLs/IADLs  Prognosis: Good  Activity Tolerance  Activity Tolerance: Patient Tolerated treatment well        Plan   Occupational Therapy Plan  Times Per Week: 3-4x/wk  Current Treatment Recommendations: Strengthening, Balance training, Functional mobility training, Endurance training, Safety education & training, Positioning, Patient/Caregiver education & training, Equipment evaluation, education, & procurement, Self-Care / ADL, Home management training     Restrictions  Restrictions/Precautions  Restrictions/Precautions: Fall Risk, General Precautions  Required Braces or Orthoses?: No  Position Activity Restriction  Other position/activity restrictions: up with assist    Subjective   General  Chart Reviewed: Yes  Patient assessed for rehabilitation services?: Yes  Response to previous treatment: Patient with no complaints from previous session  Family / Caregiver Present: No  General Comment  Comments: RN ok'd patient for OT treatment this morning. Pt supine in bed upon [facility] arrival. Pt agreeable to session and reports lower abdominal pain without rating and was able to continue session. Objective   Safety Devices  Type of Devices: Nurse notified;Gait belt;Left in bed;Bed alarm in place;Call light within reach  Restraints  Restraints Initially in Place: No  Balance  Sitting: Without support (Pt sat EOB 15-20 mintues for ADL tasks with SBA once midline achieved)  Transfer Training  Transfer Training: No (Unable to attempt due to physician present in middle of session and progressed with ADL tasks when transport arrived to take patient to scheduled procedure)        ADL  Grooming: Supervision;Setup  Grooming Skilled Clinical Factors: Pt completed oral care, eash face, neck and hands while sitting EOB  UE Bathing: Stand by assistance;Supervision  LE Bathing Skilled Clinical Factors: Pt declined LB bathing due to going to procedure  UE Dressing: Supervision  UE Dressing Skilled Clinical Factors: Donned gown with no LOB at EOB  Additional Comments: Pt completed all ADL tasks while seated at EOB with no LOB and good safety demonstrated.  Transport arrived to take patient to procedure, returnd patient to bed        Bed mobility  Supine to Sit: Maximum assistance  Sit to Supine: Maximum assistance  Scooting: Moderate assistance  Bed Mobility Comments: HOB raised 65 degrees, bedrail use, instructions for body mechanics and assist for trunk progression throughout        Cognition  Overall Cognitive Status: Exceptions  Arousal/Alertness: Appropriate responses to stimuli  Following Commands: Follows one step commands consistently; Follows multistep commands with increased time  Attention Span: Appears intact  Safety Judgement: Decreased awareness of need for safety  Problem Solving: Assistance required to correct errors made;Assistance required to identify errors made  Insights: Decreased awareness of deficits  Initiation: Requires cues for some  Orientation  Overall Orientation Status: Within Functional Limits         Education Given To: Patient  Education Provided: Role of Therapy;Precautions; ADL Adaptive Strategies; Plan of Care;Energy Conservation  Education Provided Comments: Safety, body mechanics  Education Method: Verbal;Demonstration  Education Outcome: Verbalized understanding;Continued education needed;Demonstrated understanding    AM-PAC Score  AM-[facility] Inpatient Daily Activity Raw Score: 18 (02/24/23 1217)  AM-PAC Inpatient ADL T-Scale Score : 38.66 (02/24/23 1217)  ADL Inpatient CMS 0-100% Score: 46.65 (02/24/23 1217)  ADL Inpatient CMS G-Code Modifier : CK (02/24/23 1217)      Goals  Short Term Goals  Time Frame for Short Term Goals: Patient will, by discharge  Short Term Goal 1: demo UB ADLs at Mod I  Short Term Goal 2: demo LB ADLs at [address] using AE PRN  Short Term Goal 3: demo toileting tasks at [facility] 4: demo functional transfers/mobility using LRD at [facility] to engage in ADLs  Short Term Goal 5: demo 10+ min of dynamic standing tolerance at [facility] to engage in ADLs  Short Term Goal 6: demo 15+ min of dynamic sitting balance at Supervision to engage in ADLs safely  Short Term Goal 7: notify OTR to update POC as patient progresses       Therapy Time   Individual Concurrent Group Co-treatment   Time In 1035         Time Out 1113         Minutes 38         Timed Code Treatment Minutes: 25 [facility], [facility]/

## 2023-02-24 NOTE — ANESTHESIA PRE PROCEDURE
Department of Anesthesiology  Preprocedure Note       Name:  Ramiro Blanco   Age:  [de-identified] y.o.  :  1940                                          MRN:  [id number]         Date:  2023      Surgeon: Tracie Toscano):  Macie Bingham MD    Procedure: Procedure(s):  CYSTOSCOPY TRANSURETHRAL RESECTION BLADDER, EVACUATION OD BLOOD CLOTS    Medications prior to admission:   Prior to Admission medications    Medication Sig Start Date End Date Taking?  Authorizing Provider   cephALEXin (KEFLEX) 500 MG capsule Take 500 mg by mouth 3 times daily 23  Historical Provider, MD   metroNIDAZOLE (FLAGYL) 500 MG tablet Take 500 mg by mouth 3 times daily 23  Historical Provider, MD   bisacodyl (DULCOLAX) 5 MG EC tablet Take 5 mg by mouth daily as needed for Constipation    Historical Provider, MD   warfarin (COUMADIN) 5 MG tablet Take 5 mg by mouth daily Follows coumadin clinic    Historical Provider, MD   amLODIPine (NORVASC) 5 MG tablet Take 5 mg by mouth daily 10/29/21 11/21/22  Historical Provider, MD   atorvastatin (LIPITOR) 80 MG tablet Take 80 mg by mouth daily 21  Historical Provider, MD   finasteride (PROSCAR) 5 MG tablet Take 5 mg by mouth daily 21  Historical Provider, MD   furosemide (LASIX) 40 MG tablet Take 40 mg by mouth daily 22  Historical Provider, MD   glimepiride (AMARYL) 2 MG tablet Take 2 mg by mouth 22  Historical Provider, MD   hydrALAZINE (APRESOLINE) 50 MG tablet Take 50 mg by mouth 2 times daily 21  Historical Provider, MD   lisinopril (PRINIVIL;ZESTRIL) 20 MG tablet Take 20 mg by mouth 2 times daily  21  Historical Provider, MD   metFORMIN (GLUCOPHAGE) 1000 MG tablet Take 1,000 mg by mouth 21  Historical Provider, MD   potassium chloride (KLOR-CON M) 10 MEQ extended release tablet TAKE 1 TABLET (10 MEQ TOTAL) BY MOUTH DAILY WITH FOOD 21  Historical Provider, MD vitamin B-12 (CYANOCOBALAMIN) 1000 MCG tablet Take 1,000 mcg by mouth daily    Historical Provider, MD   tamsulosin (FLOMAX) 0.4 MG capsule take 1 capsule by mouth once daily 5/20/19   Fabiana Gillespie MD       Current medications:    No current facility-administered medications for this visit. No current outpatient medications on file.      Facility-Administered Medications Ordered in Other Visits   Medication Dose Route Frequency Provider Last Rate Last Admin    0.9 % sodium chloride infusion   IntraVENous PRN Sanjuana Cockayne, MD        [facility]] oxyCODONE (ROXICODONE) immediate release tablet 5 mg  5 mg Oral Q6H PRN Nicki Hopson MD   5 mg at 02/23/23 1822    Or    [[facility] Hold] oxyCODONE (ROXICODONE) immediate release tablet 10 mg  10 mg Oral Q4H PRN Nicki Hopson MD        [facility]] 0.9 % sodium chloride infusion   IntraVENous PRN Sanjuana Cockayne, MD        [facility]] albumin human 25 % IV solution 25 g  25 g IntraVENous PRN Sanjuana Cockayne, MD        [facility]] cephALEXin [facility]) capsule 500 mg  500 mg Oral 2 times per day Sanjuana Cockayne, MD   500 mg at 02/24/23 0817    [[facility] Hold] epoetin pedro-epbx (RETACRIT) injection 8,000 Units  8,000 Units IntraVENous Once per day on Mon Wed Fri Sanjuana Cockayne, MD   8,000 Units at 02/21/23 1728    [[facility] Hold] heparin (porcine) injection 1,200 Units  1,200 Units IntraCATHeter PRN Sanjuana Cockayne, MD   1,200 Units at 02/22/23 1510    [[facility] Hold] heparin (porcine) injection 1,300 Units  1,300 Units IntraCATHeter PRN Sanjuana Cockayne, MD   1,300 Units at 02/22/23 1510    [[facility] Hold] midodrine (PROAMATINE) tablet 2.5 mg  2.5 mg Oral TID PRN Sanjuana Cockayne, MD   2.5 mg at 02/22/23 1039    [[facility] Hold] bisacodyl (DULCOLAX) EC tablet 5 mg  5 mg Oral Daily PRN Sanjuana Cockayne, MD        [Held by provider] amLODIPine (NORVASC) tablet 5 mg  5 mg Oral Daily Vanessa Moura DO   5 mg at 02/20/23 1036    [[facility] Hold] atorvastatin (LIPITOR) tablet 80 mg  80 mg Oral Daily Sanjuana Cockayne, MD   80 mg at 02/24/23 2466  [[facility] Hold] finasteride (PROSCAR) tablet 5 mg  5 mg Oral Daily Sanjuana Cockayne, MD   5 mg at 02/24/23 0817    [Held by provider] glipiZIDE (GLUCOTROL) tablet 5 mg  5 mg Oral QAM AC KATT Rey - CNP   5 mg at 02/19/23 0829    [Held by provider] hydrALAZINE (APRESOLINE) tablet 50 mg  50 mg Oral BID Vanessaedwardo Moura, DO   50 mg at 02/20/23 1036    [Held by provider] lisinopril (PRINIVIL;ZESTRIL) tablet 20 mg  20 mg Oral BID KATT Rey - CNP   20 mg at 02/19/23 0829    [Held by provider] metFORMIN (GLUCOPHAGE) tablet 1,000 mg  1,000 mg Oral Daily with breakfast Monique KATT Marquez - CNP   1,000 mg at 02/19/23 0829    [[facility] Hold] vitamin B-12 (CYANOCOBALAMIN) tablet 1,000 mcg  1,000 mcg Oral Daily Sanjuana Cockayne, MD   1,000 mcg at 02/24/23 0817    [[facility] Hold] tamsulosin (FLOMAX) capsule 0.4 mg  0.4 mg Oral Daily Sanjuana Cockayne, MD   0.4 mg at 02/24/23 0817    [Held by provider] potassium chloride (KLOR-CON M) extended release tablet 10 mEq  10 mEq Oral Daily KATT Rey - CNP   10 mEq at 02/19/23 0830    [[facility] Hold] sodium chloride flush 0.9 % injection 5-40 mL  5-40 mL IntraVENous 2 times per day Sanjuana Cockayne, MD   10 mL at 02/24/23 0812    [[facility] Hold] sodium chloride flush 0.9 % injection 10 mL  10 mL IntraVENous PRN Sanjuana Cockayne, MD        [facility]] 0.9 % sodium chloride infusion   IntraVENous PRN Sanjuana Cockayne, MD        [facility]] ondansetron (ZOFRAN-ODT) disintegrating tablet 4 mg  4 mg Oral Q8H PRN Sanjuana Cockayne, MD        Or   Samson Chang [facility] Hold] ondansetron [facility]) injection 4 mg  4 mg IntraVENous Q6H PRN Sanjuana Cockayne, MD        [facility]] polyethylene glycol (GLYCOLAX) packet 17 g  17 g Oral Daily PRN Sanjuana Cockayne, MD        [facility]] acetaminophen (TYLENOL) tablet 650 mg  650 mg Oral Q6H PRN Sanjuana Cockayne, MD   650 mg at 02/24/23 0823    Or    [[facility] Hold] acetaminophen (TYLENOL) suppository 650 mg  650 mg Rectal Q6H PRN Sanjuana Cockayne, MD        [facility]] morphine (PF) injection 2 mg  2 mg IntraVENous Q4H PRN Rebekah Joaquin MD   2 mg at 02/23/23 0922    [[facility] Hold] opium-belladonna (B&O SUPPRETTES) 16.2-60 MG suppository 60 mg  60 mg Rectal Q8H PRN Rebekah Joaquin MD   60 mg at 02/21/23 1042    [[facility] Hold] dextrose bolus 10% 125 mL  125 mL IntraVENous PRN Rebekah Joaquin MD        Or   Emily [facility]] dextrose bolus 10% 250 mL  250 mL IntraVENous PRN Rebekah Joaquin MD        [facility]] glucagon (rDNA) injection 1 mg  1 mg SubCUTAneous PRN Rebekah Joaquin MD        [facility]] dextrose 10 % infusion   IntraVENous Continuous PRN Rebekah Joaquin MD        [facility]] metroNIDAZOLE (FLAGYL) tablet 500 mg  500 mg Oral 3 times per day Rebekah Joaquin MD   500 mg at 02/24/23 0816    [[facility] Hold] insulin lispro (HUMALOG) injection vial 0-8 Units  0-8 Units SubCUTAneous TID WC Rebekah Joaquin MD        [facility] Hold] insulin lispro (HUMALOG) injection vial 0-4 Units  0-4 Units SubCUTAneous Nightly MD Emily Canales [facility] Hold] glucose chewable tablet 16 g  4 tablet Oral PRN eRbekah Joaquin MD           Allergies:  No Known Allergies    Problem List:    Patient Active Problem List   Diagnosis Code    Atrial fibrillation (HCC) I48.91    Benign prostatic hyperplasia with urinary frequency N40.1, R35.0    Cardiomyopathy ([facility] I42.9    Coronary atherosclerosis I25.10    Gout M10.9    Obesity E66.9    PVD (peripheral vascular disease) ([facility] I73.9    Right ventricular dilation I51.7    Tricuspid regurgitation I07.1    Type 2 diabetes mellitus with diabetic polyneuropathy (HCC) E11.42    Malignant neoplasm of overlapping sites of bladder ([facility] C67.8    Clot retention of urine R33.8    Gross hematuria R31.0    On continuous oral anticoagulation Z79.01    Colitis K52.9    Bladder carcinoma (HCC) C67.9    MATTI (acute kidney injury) ([facility] N17.9    Hyperkalemia E87.5    Supratherapeutic INR R79.1    Orthostatic hypotension I95.1    Acute blood loss anemia: Due to hematuria/bladder cancer D62    Urinary retention R33.9    Stage 3a chronic kidney disease (HCC) N18.31    Hx of type 2 diabetes mellitus Z86.39    Hx of chronic kidney disease Z87.448       Past Medical History:        Diagnosis Date    Atrial fibrillation ([facility] 07/01/2022    Benign prostatic hyperplasia with urinary frequency 03/17/2018    Cancer ([facility]     bladder    Cardiomyopathy ([facility] 05/22/2019    CKD (chronic kidney disease)     Coronary atherosclerosis 07/01/2022    Gout 08/18/2021    Morbid obesity with body mass index (BMI) of 45.0 to 49.9 in adult [facility]) 07/01/2022    Obesity     Pulmonary hypertension ([facility]     PVD (peripheral vascular disease) ([facility] 05/22/2019    Right ventricular dilation 04/01/2018    Tricuspid regurgitation 04/01/2018    Formatting of this note might be different from the original. mild  Formatting of this note might be different from the original. mild    Type 2 diabetes mellitus with diabetic polyneuropathy ([facility] 07/01/2022       Past Surgical History:        Procedure Laterality Date    BACK SURGERY      three times    COLONOSCOPY N/A     [facility] [address] [facility]'s    COLONOSCOPY W/ BIOPSIES N/A 09/12/2022    diverticulosis, x 4 adenoma polyps removed, done @ [facility] by Dr. Daren Sumner.     CYSTOSCOPY      CYSTOSCOPY      w bladder biospy    CYSTOSCOPY N/A 09/23/2022    CYSTOSCOPY TRANSURETHRAL RESECTION BLADDER WITH INSTILLATION GEMCITIBINE performed by Sharri Augustine MD at [address]  02/23/2023    CYSTOSCOPY EVACUATION OF CLOTS, TUR BLADDER TUMOR    FINGER SURGERY      IR NONTUNNELED VASCULAR CATHETER  02/21/2023    IR NONTUNNELED VASCULAR CATHETER 2/21/2023 [facility]    KNEE ARTHROPLASTY Left     TRANSURETHRAL RESECTION OF BLADDER TUMOR         Social History:    Social History     Tobacco Use    Smoking status: Never    Smokeless tobacco: Never   Substance Use Topics    Alcohol use: Not Currently                                Counseling given: Not Answered      Vital Signs (Current): There were no vitals filed for this visit.                                            BP Readings from Last 3 Encounters:   02/24/23 (!) 106/43   11/21/22 (!) 150/72   09/24/22 123/66       NPO Status:                                                                                 BMI:   Wt Readings from Last 3 Encounters:   02/24/23 (!) 308 lb 3.3 oz (139.8 kg)   11/21/22 (!) 308 lb (139.7 kg)   09/23/22 (!) 311 lb (141.1 kg)     There is no height or weight on file to calculate BMI.    CBC:   Lab Results   Component Value Date/Time    WBC 11.3 02/24/2023 06:16 AM    RBC 2.36 02/24/2023 06:16 AM    HGB 7.0 02/24/2023 06:16 AM    HCT 23.5 02/24/2023 06:16 AM    MCV 99.6 02/24/2023 06:16 AM    RDW 14.3 02/24/2023 06:16 AM     02/24/2023 06:16 AM       CMP:   Lab Results   Component Value Date/Time     02/24/2023 06:16 AM    K 4.3 02/24/2023 06:16 AM     02/24/2023 06:16 AM    CO2 23 02/24/2023 06:16 AM    BUN 36 02/24/2023 06:16 AM    CREATININE 5.32 02/24/2023 06:16 AM    GFRAA 55 09/24/2022 04:18 AM    LABGLOM 10 02/24/2023 06:16 AM    GLUCOSE 90 02/24/2023 06:16 AM    PROT 6.2 02/21/2023 05:43 AM    CALCIUM 7.4 02/24/2023 06:16 AM    BILITOT 0.4 02/21/2023 05:43 AM    ALKPHOS 113 02/21/2023 05:43 AM    AST 14 02/21/2023 05:43 AM    ALT 7 02/21/2023 05:43 AM       POC Tests:   Recent Labs     02/24/23  1221   POCGLU 96       Coags:   Lab Results   Component Value Date/Time    PROTIME 15.3 02/23/2023 05:00 AM    INR 1.5 02/23/2023 05:00 AM    APTT 37.2 02/21/2023 11:29 AM       HCG (If Applicable): No results found for: PREGTESTUR, PREGSERUM, HCG, HCGQUANT     ABGs: No results found for: PHART, PO2ART, UNY2LEX, DIT1MKB, BEART, J2CXUAAM     Type & Screen (If Applicable):  No results found for: LABABO, LABRH    Drug/Infectious Status (If Applicable):  Lab Results   Component Value Date/Time    HEPCAB NONREACTIVE 02/21/2023 11:29 AM       COVID-19 Screening (If Applicable): No results found for: COVID19        Anesthesia Evaluation  Patient summary reviewed and Nursing notes reviewed no history of anesthetic complications:   Airway: Mallampati: III          Dental:    (+) edentulous      Pulmonary:normal exam  breath sounds clear to auscultation  (+) shortness of breath:  sleep apnea:                             Cardiovascular:  Exercise tolerance: no interval change,   (+) CAD: no interval change, dysrhythmias: atrial fibrillation,       ECG reviewed  Rhythm: regular  Rate: normal  Echocardiogram reviewed               ROS comment: Cardiomyopathy     Neuro/Psych:   (+) neuromuscular disease:,             GI/Hepatic/Renal:   (+) renal disease: CRI, no interval change and ESRD, morbid obesity         ROS comment: Bladder tumor    Patient just started on dialysis this week because of increasing Cr. Endo/Other:    (+) DiabetesType II DM, no interval change, , blood dyscrasia: anemia:., malignancy/cancer. ROS comment: Gout Abdominal:   (+) obese,     Abdomen: soft. Vascular:   + PVD, aortic or cerebral, . Other Findings:             Anesthesia Plan      general     ASA 4     (Glide scope    I explained my concerns about the patient's perioperative risk for respiratory complications (including post-op mechanical ventilation). The patient (along with son and daughter) wish to proceed.)  Induction: intravenous. MIPS: Postoperative opioids intended, Postoperative trial extubation and Postoperative ventilation. Anesthetic plan and risks discussed with patient. Use of blood products discussed with patient whom consented to blood products. Plan discussed with CRNA.                     Gisel Mesa MD   2/24/2023

## 2023-02-24 NOTE — PROGRESS NOTES
Nephrology Progress Note      SUBJECTIVE      Patient seen and examined bedside  No acute issues overnight  Denies any new symptoms, reports feeling okay overall  Status post cystoscopy yesterday, MATHEUS ATKINS  Remained afebrile overnight, heart rate in the 50s to 60s, blood pressure labile on the softer side mostly, saturating well on room air  Labs this morning showed sodium 139, potassium 4.3, bicarb 23, BUN 36, creatinine 5.32 WBC 11.3, hemoglobin 7, platelets 847  Urine output charted 1 L, although he is on CBI    OBJECTIVE      CURRENT TEMPERATURE:  Temp: 97.7 °F (36.5 °C)  MAXIMUM TEMPERATURE OVER 24HRS:  Temp (24hrs), Av.8 °F (36.6 °C), Min:97.1 °F (36.2 °C), Max:98.4 °F (36.9 °C)    CURRENT RESPIRATORY RATE:  Resp: 18  CURRENT PULSE:  Heart Rate: 50  CURRENT BLOOD PRESSURE:  BP: (!) 93/46  24HR BLOOD PRESSURE RANGE:  Systolic (36DBO), UPF:315 , Min:85 , XDE:242   ; Diastolic (67TOW), EIN:57, Min:38, Max:156    24HR INTAKE/OUTPUT:    Intake/Output Summary (Last 24 hours) at 2023 0958  Last data filed at 2023 0645  Gross per 24 hour   Intake 500 ml   Output 1010 ml   Net -510 ml       PHYSICAL EXAM      GENERAL APPEARANCE:Awake, alert, in no acute distress  SKIN: warm   EYES: conjunctivae normal  NECK:  JVD: None   PULMONARY: Clear breath sounds bilaterally  CADRDIOVASCULAR: Normal heart sounds  ABDOMEN: Soft, nontender, nondistended  EXTREMITIES: No significant pedal edema    CURRENT MEDICATIONS      oxyCODONE (ROXICODONE) immediate release tablet 5 mg, Q6H PRN   Or  oxyCODONE (ROXICODONE) immediate release tablet 10 mg, Q4H PRN  0.9 % sodium chloride infusion, PRN  albumin human 25 % IV solution 25 g, PRN  cephALEXin (KEFLEX) capsule 500 mg, 2 times per day  epoetin pedro-epbx (RETACRIT) injection 8,000 Units, Once per day on   heparin (porcine) injection 1,200 Units, PRN  heparin (porcine) injection 1,300 Units, PRN  midodrine (PROAMATINE) tablet 2.5 mg, TID PRN  bisacodyl (DULCOLAX) EC tablet 5 mg, Daily PRN  [Held by provider] amLODIPine (NORVASC) tablet 5 mg, Daily  atorvastatin (LIPITOR) tablet 80 mg, Daily  finasteride (PROSCAR) tablet 5 mg, Daily  [Held by provider] glipiZIDE (GLUCOTROL) tablet 5 mg, QAM AC  [Held by provider] hydrALAZINE (APRESOLINE) tablet 50 mg, BID  [Held by provider] lisinopril (PRINIVIL;ZESTRIL) tablet 20 mg, BID  [Held by provider] metFORMIN (GLUCOPHAGE) tablet 1,000 mg, Daily with breakfast  vitamin B-12 (CYANOCOBALAMIN) tablet 1,000 mcg, Daily  tamsulosin (FLOMAX) capsule 0.4 mg, Daily  [Held by provider] potassium chloride (KLOR-CON M) extended release tablet 10 mEq, Daily  sodium chloride flush 0.9 % injection 5-40 mL, 2 times per day  sodium chloride flush 0.9 % injection 10 mL, PRN  0.9 % sodium chloride infusion, PRN  ondansetron (ZOFRAN-ODT) disintegrating tablet 4 mg, Q8H PRN   Or  ondansetron (ZOFRAN) injection 4 mg, Q6H PRN  polyethylene glycol (GLYCOLAX) packet 17 g, Daily PRN  acetaminophen (TYLENOL) tablet 650 mg, Q6H PRN   Or  acetaminophen (TYLENOL) suppository 650 mg, Q6H PRN  morphine (PF) injection 2 mg, Q4H PRN  opium-belladonna (B&O SUPPRETTES) 16.2-60 MG suppository 60 mg, Q8H PRN  dextrose bolus 10% 125 mL, PRN   Or  dextrose bolus 10% 250 mL, PRN  glucagon (rDNA) injection 1 mg, PRN  dextrose 10 % infusion, Continuous PRN  metroNIDAZOLE (FLAGYL) tablet 500 mg, 3 times per day  insulin lispro (HUMALOG) injection vial 0-8 Units, TID WC  insulin lispro (HUMALOG) injection vial 0-4 Units, Nightly  glucose chewable tablet 16 g, PRN  0.9 % sodium chloride infusion, Continuous          LABS      CBC:   Recent Labs     02/22/23  1650 02/23/23  0835 02/24/23  0616   WBC  --   --  11.3   RBC  --   --  2.36*   HGB 8.0* 7.2* 7.0*   HCT 25.4* 23.6* 23.5*   MCV  --   --  99.6   MCH  --   --  29.7   MCHC  --   --  29.8   RDW  --   --  14.3   PLT  --   --  219   MPV  --   --  10.3      BMP:   Recent Labs     02/22/23  0623 02/23/23  0500 02/24/23  0616   NA  137 138 139   K 4.9 4.2 4.3    101 103   CO2 20 22 23   BUN 38* 26* 36*   CREATININE 5.70* 4.99* 5.32*   GLUCOSE 69* 90 90   CALCIUM 7.8* 7.6* 7.4*     JONATHAN:   Lab Results   Component Value Date    JONATHAN EQUIVOCAL (A) 02/20/2023       SPEP:   Lab Results   Component Value Date/Time    PROT 6.2 02/21/2023 05:43 AM    PATH ELECTRONICALLY SIGNED. Elly Madden M.D. 02/20/2023 04:16 PM     HEPBSAG:  Lab Results   Component Value Date/Time    HEPBSAG NONREACTIVE 02/21/2023 11:29 AM     HEPCAB:  Lab Results   Component Value Date/Time    HEPCAB NONREACTIVE 02/21/2023 11:29 AM     C3:   Lab Results   Component Value Date    C3 127 02/20/2023     C4:   Lab Results   Component Value Date    C4 31 02/20/2023     MPO ANCA:   Lab Results   Component Value Date/Time    MPO <0.3 02/20/2023 04:16 PM    .  PR3 ANCA:    Lab Results   Component Value Date/Time    PR3 <0.7 02/20/2023 04:16 PM       RADIOLOGY      Reviewed as available. ASSESSMENT      #1 acute kidney injury secondary to obstructive uropathy related to bladder clots and complicated further by hypotension/concomitant diuretic and ACE inhibitor use - initiated dialysis on 2/21/2023, had 2 sessions  #2 metabolic acidosis - improving  #3 chronic kidney disease stage III with baseline creatinine 1.4-1.6. Never seen a nephrologist in the past  #4 hospitalized for evaluation of hematuria with background history of bladder tumor status post TURBT in the past, this post cystoscopy, clot evacuation, TURP, TURBT  #5 type 2 diabetes  #6 history of BPH  #7 anemia    PLAN      1. We will plan for dialysis session today   2. Can stop IV fluids, encourage p.o. intake  3. Strict intake and output monitoring  4. Close monitoring of electrolytes and renal function  5. Hematuria management as per urology  6. Monitor hemoglobin closely  7.   We will follow      Vandana Mccord  PGY-2  Internal Medicine  [address] [facility]   9:59 AM 2/24/2023   Attending Physician Statement  I have discussed the care of Earnstine Spore, including pertinent history and exam findings with the resident/fellow. I have reviewed the key elements of all parts of the encounter with the resident/fellow. I have seen and examined the patient with the resident/fellow. I agree with the assessment and plan and status of the problem list as documented.       Catrachito Mendoza MD , MD

## 2023-02-25 LAB
ABO/RH: NORMAL
ANION GAP SERPL CALCULATED.3IONS-SCNC: 12 MMOL/L (ref 9–17)
ANTIBODY SCREEN: NEGATIVE
ARM BAND NUMBER: NORMAL
BLD PROD TYP BPU: NORMAL
BLD PROD TYP BPU: NORMAL
BLOOD BANK BLOOD PRODUCT EXPIRATION DATE: NORMAL
BLOOD BANK BLOOD PRODUCT EXPIRATION DATE: NORMAL
BLOOD BANK ISBT PRODUCT BLOOD TYPE: 6200
BLOOD BANK ISBT PRODUCT BLOOD TYPE: 6200
BLOOD BANK PRODUCT CODE: NORMAL
BLOOD BANK PRODUCT CODE: NORMAL
BLOOD BANK UNIT TYPE AND RH: NORMAL
BLOOD BANK UNIT TYPE AND RH: NORMAL
BPU ID: NORMAL
BPU ID: NORMAL
BUN SERPL-MCNC: 31 MG/DL (ref 8–23)
CALCIUM SERPL-MCNC: 9.8 MG/DL (ref 8.6–10.4)
CHLORIDE SERPL-SCNC: 102 MMOL/L (ref 98–107)
CO2 SERPL-SCNC: 23 MMOL/L (ref 20–31)
CREAT SERPL-MCNC: 3.62 MG/DL (ref 0.7–1.2)
CROSSMATCH RESULT: NORMAL
CROSSMATCH RESULT: NORMAL
DISPENSE STATUS BLOOD BANK: NORMAL
DISPENSE STATUS BLOOD BANK: NORMAL
EXPIRATION DATE: NORMAL
GFR SERPL CREATININE-BSD FRML MDRD: 16 ML/MIN/1.73M2
GLUCOSE BLD-MCNC: 135 MG/DL (ref 75–110)
GLUCOSE BLD-MCNC: 144 MG/DL (ref 75–110)
GLUCOSE BLD-MCNC: 183 MG/DL (ref 75–110)
GLUCOSE BLD-MCNC: 278 MG/DL (ref 75–110)
GLUCOSE BLD-MCNC: 303 MG/DL (ref 75–110)
GLUCOSE SERPL-MCNC: 155 MG/DL (ref 70–99)
HCT VFR BLD AUTO: 26.6 % (ref 40.7–50.3)
HGB BLD-MCNC: 8.2 G/DL (ref 13–17)
MCH RBC QN AUTO: 29.3 PG (ref 25.2–33.5)
MCHC RBC AUTO-ENTMCNC: 30.8 G/DL (ref 28.4–34.8)
MCV RBC AUTO: 95 FL (ref 82.6–102.9)
NRBC AUTOMATED: 0.2 PER 100 WBC
PDW BLD-RTO: 15.6 % (ref 11.8–14.4)
PLATELET # BLD AUTO: 217 K/UL (ref 138–453)
PMV BLD AUTO: 10.2 FL (ref 8.1–13.5)
POTASSIUM SERPL-SCNC: 4.4 MMOL/L (ref 3.7–5.3)
RBC # BLD: 2.8 M/UL (ref 4.21–5.77)
SODIUM SERPL-SCNC: 137 MMOL/L (ref 135–144)
TRANSFUSION STATUS: NORMAL
TRANSFUSION STATUS: NORMAL
UNIT DIVISION: 0
UNIT DIVISION: 0
UNIT ISSUE DATE/TIME: NORMAL
UNIT ISSUE DATE/TIME: NORMAL
WBC # BLD AUTO: 10.5 K/UL (ref 3.5–11.3)

## 2023-02-25 PROCEDURE — 85027 COMPLETE CBC AUTOMATED: CPT

## 2023-02-25 PROCEDURE — 99231 SBSQ HOSP IP/OBS SF/LOW 25: CPT | Performed by: INTERNAL MEDICINE

## 2023-02-25 PROCEDURE — 1200000000 HC SEMI PRIVATE

## 2023-02-25 PROCEDURE — 36415 COLL VENOUS BLD VENIPUNCTURE: CPT

## 2023-02-25 PROCEDURE — 99232 SBSQ HOSP IP/OBS MODERATE 35: CPT | Performed by: STUDENT IN AN ORGANIZED HEALTH CARE EDUCATION/TRAINING PROGRAM

## 2023-02-25 PROCEDURE — 82947 ASSAY GLUCOSE BLOOD QUANT: CPT

## 2023-02-25 PROCEDURE — 6370000000 HC RX 637 (ALT 250 FOR IP): Performed by: STUDENT IN AN ORGANIZED HEALTH CARE EDUCATION/TRAINING PROGRAM

## 2023-02-25 PROCEDURE — 2580000003 HC RX 258: Performed by: STUDENT IN AN ORGANIZED HEALTH CARE EDUCATION/TRAINING PROGRAM

## 2023-02-25 PROCEDURE — 80048 BASIC METABOLIC PNL TOTAL CA: CPT

## 2023-02-25 RX ADMIN — CEPHALEXIN 500 MG: 500 CAPSULE ORAL at 08:51

## 2023-02-25 RX ADMIN — FINASTERIDE 5 MG: 5 TABLET, FILM COATED ORAL at 08:51

## 2023-02-25 RX ADMIN — INSULIN LISPRO 6 UNITS: 100 INJECTION, SOLUTION INTRAVENOUS; SUBCUTANEOUS at 12:04

## 2023-02-25 RX ADMIN — SODIUM CHLORIDE, PRESERVATIVE FREE 10 ML: 5 INJECTION INTRAVENOUS at 08:51

## 2023-02-25 RX ADMIN — SODIUM CHLORIDE, PRESERVATIVE FREE 10 ML: 5 INJECTION INTRAVENOUS at 20:44

## 2023-02-25 RX ADMIN — TAMSULOSIN HYDROCHLORIDE 0.4 MG: 0.4 CAPSULE ORAL at 08:51

## 2023-02-25 RX ADMIN — METRONIDAZOLE 500 MG: 500 TABLET, FILM COATED ORAL at 16:54

## 2023-02-25 RX ADMIN — Medication 1000 MCG: at 08:51

## 2023-02-25 RX ADMIN — ATORVASTATIN CALCIUM 80 MG: 80 TABLET, FILM COATED ORAL at 08:51

## 2023-02-25 RX ADMIN — ACETAMINOPHEN 650 MG: 325 TABLET ORAL at 13:42

## 2023-02-25 RX ADMIN — ACETAMINOPHEN 650 MG: 325 TABLET ORAL at 20:44

## 2023-02-25 RX ADMIN — METRONIDAZOLE 500 MG: 500 TABLET, FILM COATED ORAL at 01:00

## 2023-02-25 RX ADMIN — CEPHALEXIN 500 MG: 500 CAPSULE ORAL at 20:44

## 2023-02-25 RX ADMIN — METRONIDAZOLE 500 MG: 500 TABLET, FILM COATED ORAL at 08:51

## 2023-02-25 RX ADMIN — ACETAMINOPHEN 650 MG: 325 TABLET ORAL at 05:47

## 2023-02-25 ASSESSMENT — PAIN SCALES - GENERAL
PAINLEVEL_OUTOF10: 3
PAINLEVEL_OUTOF10: 3
PAINLEVEL_OUTOF10: 0

## 2023-02-25 NOTE — PROGRESS NOTES
Dialysis Post Treatment Note  Vitals:    02/24/23 1853   BP: (!) 165/78   Pulse: (!) 48   Resp: 16   Temp: 97 °F (36.1 °C)   SpO2:      Pre-Weight = 149.0 kg  Post-weight = Weight: (!) 328 lb 7.8 oz (149 kg)  Total Liters Processed = Blood Volume Processed (Liters): 57.11 l/min  Rinseback Volume (mL) = Rinseback Volume (ml): 260 ml  Net Removal (mL) =  2L  Patient's dry weight=  Type of access used= R Temp cath   Length of treatment=2.5 hrs    Pt tolerated tx and fluid removal without complaint. Pt was in and out of A-fib without complaint from pt, rn notified. Pt discharged in bed per transport and tech. Report was given to pt floor JARAD Man when pt was taken back to room at 1900.

## 2023-02-25 NOTE — PROGRESS NOTES
Urology Progress Note      Subjective: Mary Matos is a [de-identified] y.o. male. His/Her current Diet is: ADULT DIET; Regular. Since the previous note, the patient reports the following:  S/P cysto with clot evacuation/TURP/TURBT on 2/23/2023 and then cysto TURBT with long loop on 2/24/2023. This AM urine is clear on moderate gtt CBI  No pain  No fevers or chills. No nausea or vomiting. Vitals and Labs:  Vitals:    02/24/23 1949 02/25/23 0001 02/25/23 0422 02/25/23 0821   BP: 135/76 (!) 143/59 107/73 136/62   Pulse: 54 56 55 62   Resp: 16 16 18 16   Temp: 97.7 °F (36.5 °C) 97.7 °F (36.5 °C) 97.6 °F (36.4 °C) 98.7 °F (37.1 °C)   TempSrc: Oral Oral Oral Oral   SpO2: 99% 96% 96% 94%   Weight:       Height:         I/O last 3 completed shifts: In: 1150 [I.V.:500; Blood:350]  Out: 3200 [Urine:900]    Recent Labs     02/24/23  0616 02/24/23  1630 02/25/23  0634   WBC 11.3  --  10.5   HGB 7.0* 8.6* 8.2*   HCT 23.5* 27.9* 26.6*   MCV 99.6  --  95.0     --  217     Recent Labs     02/23/23  0500 02/24/23  0616 02/25/23  0634    139 137   K 4.2 4.3 4.4    103 102   CO2 22 23 23   BUN 26* 36* 31*   CREATININE 4.99* 5.32* 3.62*       No results for input(s): COLORU, PHUR, LABCAST, WBCUA, RBCUA, MUCUS, TRICHOMONAS, YEAST, BACTERIA, CLARITYU, SPECGRAV, LEUKOCYTESUR, UROBILINOGEN, BILIRUBINUR, BLOODU in the last 72 hours. Invalid input(s): NITRATE, GLUCOSEUKETONESUAMORPHOUS    Physical Exam:  NAD  A/O x 3  RRR  No accessory muscles of inspiration  Abdomen soft, non-tender, non-distended. No CVA tenderness.   Mason pink urine output on moderate CBI    Impression:  [de-identified] yo male with  Clot retention  Atrial fibrillation on coumadin  Recent urolift implantation on 1/31/2023  History of bladder cancer s/p TURBT in 9/2022      Plan:  Urine clear on slow drip, will turn off CBI and reassess in 2 hours  Okay for diet  If urine stays clear, will consider void trial today  Hemoglobin 8.2 from 8.6, he did receive 1 unit of blood during surgery  Continue to hold anticoagulation      Melvin De La Rosa MD  8:51 AM 2/25/2023

## 2023-02-25 NOTE — PROGRESS NOTES
[facility]  Office: [address], DO, Philip Patel, DO, Chad Moseley, DO, Lamin Ear Blood, DO, Leonora Valdez MD, Bacilio Carter MD, Leidy Verde MD, Antonella Polanco MD,  Abbie Shepherd MD, Stacey Bai MD, Parish Human, DO, Rubens Hurst MD,  Rashi Glover MD, Stephen Palencia MD, Raulito Glaser, DO, Kunal Romero MD, Hever Agudelo MD, Danetta Ormond, DO, Mark Parra MD, Dave Ackerman MD, Mic Adams MD, Lisa Reeves MD, Urvashi Toscano, DO, Sim Loya MD, Edward Marion MD, Wayne Mehta, CNP,  Chio Miramontes, CNP, Cholo Goodman, CNP, Prema Velasco, CNP,  Dario Jimenes, [facility] [place], Betito Brower, CNP, Asya Schmidt, CNP, Eloise Hood, CNP, Carlo Alejandro, CNP, Alethea Cooks, CNP, Negin Nguyen PA-C, Jose Camejo, CNS, Lubna Collazo, CNP, Mickie Orellana, [address] [facility]    Progress Note    2/25/2023    7:49 AM    Name:   Rakesh Anderson  MRN:     [id number]     Acct:      [de-identified]   Room:   [address] [place] [address] Day:  6  Admit Date:  2/19/2023 12:32 AM    PCP:   Brian Her  Code Status:  Full Code    Subjective:     C/C:   Chief Complaint   Patient presents with    Hematuria     Transfer from [facility] FOR JOINT DISEAS[facility] d/t hematuria. Pt with history of cystoscopy with bladder bioposy and fulguration, urolift x 4  per Dr. Stacy Bertrand on 01/31/023. Pt with urinary retention, dribbling and passing blood clots today. Pt with history of atrial fibrillation, currently on Coumadin. INR-3.71 today. Pt reports he has held his coumadin for the past three days. Pt arrives with casey catheter in place with scant amount of bright red blood. Interval History Status:     Pt is more awake alert today, good attitude    Brief History:     53-year-old male transferred from [facility] JOINT DISE[facility] secondary to hematuria. He does have a history of recent cystoscopy with bladder biopsy and fulguration, UroLift x4 with Dr. Stacy Bertrand. This was on 2023. He is on Coumadin for atrial fibrillation with a supratherapeutic INR on admission. Currently is being held his most recent INR is 2.8. His chief complaint on arrival was difficulty in urination and noticing blood in his urine    Past Medical History:   has a past medical history of Atrial fibrillation ([facility] Benign prostatic hyperplasia with urinary frequency, Cancer ([facility] [facility] Cardiomyopathy ([facility] [facility] CKD (chronic kidney disease), Coronary atherosclerosis, Gout, Morbid obesity with body mass index (BMI) of 45.0 to 49.9 in adult [facility]), Obesity, Pulmonary hypertension ([facility] PVD (peripheral vascular disease) ([facility] Right ventricular dilation, Tricuspid regurgitation, and Type 2 diabetes mellitus with diabetic polyneuropathy [facility] Social History:   reports that he has never smoked. He has never used smokeless tobacco. He reports that he does not currently use alcohol. He reports that he does not use drugs. Family History:   Family History   Problem Relation Age of Onset    Heart Disease Mother     Breast Cancer Sister     Cancer Brother         prostate       Review of Systems:     12 point ROS negative other than what is noted in brief history  He does have some difficulty with urination and is feeling ill in general  Has also noted blood in his urine    Physical Examination:        Vitals  /73   Pulse 55   Temp 97.6 °F (36.4 °C) (Oral)   Resp 18   Ht 5' 11\" (1.803 m)   Wt (!) 328 lb 7.8 oz (149 kg)   SpO2 96%   BMI 45.81 kg/m²   Temp (24hrs), Av.3 °F (36.3 °C), Min:96.5 °F (35.8 °C), Max:97.7 °F (36.5 °C)    Recent Labs     23  1501 23  2114 23  0659   POCGLU 107 186* 198* 144*         Physical Exam  Vitals reviewed. Constitutional:       Appearance: Normal appearance. He is ill-appearing. HENT:      Head: Normocephalic. Eyes:      Extraocular Movements: Extraocular movements intact.       Conjunctiva/sclera: Conjunctivae normal.   Cardiovascular:      Rate and Rhythm: Normal rate and regular rhythm. Pulses: Normal pulses. Heart sounds: Normal heart sounds. Pulmonary:      Effort: Pulmonary effort is normal.      Breath sounds: Normal breath sounds. Abdominal:      Comments: Slightly distended abdomen   Neurological:      General: No focal deficit present. Mental Status: He is alert. Psychiatric:         Mood and Affect: Mood normal.         Behavior: Behavior normal.         Thought Content: Thought content normal.         Judgment: Judgment normal.     Alert and oriented had some confusion    Medications: Allergies:  No Known Allergies    Current Meds:   Scheduled Meds:    cephALEXin  500 mg Oral 2 times per day    epoetin pedro-epbx  8,000 Units IntraVENous Once per day on Mon Wed Fri    atorvastatin  80 mg Oral Daily    finasteride  5 mg Oral Daily    vitamin B-12  1,000 mcg Oral Daily    tamsulosin  0.4 mg Oral Daily    sodium chloride flush  5-40 mL IntraVENous 2 times per day    metroNIDAZOLE  500 mg Oral 3 times per day    insulin lispro  0-8 Units SubCUTAneous TID WC    insulin lispro  0-4 Units SubCUTAneous Nightly     Continuous Infusions:    sodium chloride      sodium chloride      sodium chloride      sodium chloride      dextrose       PRN Meds: sodium chloride, sodium chloride, oxyCODONE **OR** oxyCODONE, sodium chloride, albumin human, heparin (porcine), heparin (porcine), midodrine, bisacodyl, sodium chloride flush, sodium chloride, ondansetron **OR** ondansetron, polyethylene glycol, acetaminophen **OR** acetaminophen, morphine, opium-belladonna, dextrose bolus **OR** dextrose bolus, glucagon (rDNA), dextrose, glucose    Data:     I/O (24Hr):     Intake/Output Summary (Last 24 hours) at 2/25/2023 0749  Last data filed at 2/25/2023 0422  Gross per 24 hour   Intake 1150 ml   Output 2700 ml   Net -1550 ml         Labs:  Hematology:  Recent Labs     02/23/23  0500 02/23/23  0835 02/24/23  0964 02/24/23  1630 02/25/23  0634   WBC  --   --  11.3  --  10.5   RBC  --   --  2.36*  --  2.80*   HGB  --    < > 7.0* 8.6* 8.2*   HCT  --    < > 23.5* 27.9* 26.6*   MCV  --   --  99.6  --  95.0   MCH  --   --  29.7  --  29.3   MCHC  --   --  29.8  --  30.8   RDW  --   --  14.3  --  15.6*   PLT  --   --  219  --  217   MPV  --   --  10.3  --  10.2   INR 1.5  --   --   --   --     < > = values in this interval not displayed. Chemistry:  Recent Labs     02/23/23  0500 02/24/23  0616 02/25/23  0634    139 137   K 4.2 4.3 4.4    103 102   CO2 22 23 23   GLUCOSE 90 90 155*   BUN 26* 36* 31*   CREATININE 4.99* 5.32* 3.62*   ANIONGAP 15 13 12   LABGLOM 11* 10* 16*   CALCIUM 7.6* 7.4* 9.8       Recent Labs     02/24/23  1114 02/24/23  1221 02/24/23  1501 02/24/23 2000 02/24/23  2114 02/25/23  0659   POCGLU 78 96 107 186* 198* 144*       ABG:No results found for: POCPH, PHART, PH, POCPCO2, CIB2GIS, PCO2, POCPO2, PO2ART, PO2, POCHCO3, LLY0COL, HCO3, NBEA, PBEA, BEART, BE, THGBART, THB, PBI4PYP, HZET9KGF, B6DHBMYK, O2SAT, FIO2  No results found for: SPECIAL  No results found for: CULTURE    Radiology:  No results found.     Assessment:        Primary Problem  Clot retention of urine    Active Hospital Problems    Diagnosis Date Noted    Hx of type 2 diabetes mellitus [Z86.39] 02/22/2023     Priority: Medium    Hx of chronic kidney disease [Z87.448] 02/22/2023     Priority: Medium    Stage 3a chronic kidney disease ([facility] [N18.31] 02/21/2023     Priority: Medium    Supratherapeutic INR [R79.1] 02/20/2023     Priority: Medium    Orthostatic hypotension [I95.1] 02/20/2023     Priority: Medium    Acute blood loss anemia: Due to hematuria/bladder cancer [D62] 02/20/2023     Priority: Medium    Urinary retention [R33.9] 02/20/2023     Priority: Medium    Clot retention of urine [R33.8] 02/19/2023     Priority: Medium    Gross hematuria [R31.0] 02/19/2023     Priority: Medium    On continuous oral anticoagulation [Z79.01] 02/19/2023     Priority: Medium    Colitis [K52.9] 02/19/2023     Priority: Medium    Bladder carcinoma ([facility] [C67.9] 02/19/2023     Priority: Medium    MATTI (acute kidney injury) ([facility] [N17.9] 02/19/2023     Priority: Medium    Hyperkalemia [E87.5] 02/19/2023     Priority: Medium    Atrial fibrillation ([facility] [I48.91] 07/01/2022     Priority: Medium    Type 2 diabetes mellitus with diabetic polyneuropathy ([facility] [E11.42]      Priority: Medium         Plan:        Postoperative day #1 status post cystoscopy with clot evacuation fulguration transurethral resection of bladder tissue and prostate  Continue to hold anticoagulation  Further recommendations from urology to follow regarding surgery or not  If no surgery planned patient can be discharged next 24 to 48 hours  Temp dialysis catheter put in my neprhology yesterday  Worsening cr yesterday, very poor residual kidney function  Bgm adequate watch for hypoglycemia  Hgb this AM much improved, urine looks more clear, clamp trial     Post op with completion of cystoscopy       IP CONSULT TO UROLOGY  IP CONSULT TO HOSPITALIST  IP CONSULT TO UROLOGY  IP CONSULT TO NEPHROLOGY  IP CONSULT TO IV TEAM    Kye Harrington MD  2/25/2023  7:49 AM

## 2023-02-25 NOTE — PROGRESS NOTES
Nephrology Progress Note      SUBJECTIVE      Patient seen and examined bedside  No acute issues overnight  Patient reports that he is feeling much better  Patient had dialysis session yesterday which ran for about 2.5 hours, net removal 2 L, tolerated well without any issues  Repeat cystoscopy yesterday for repeat TURBT, did received 1 unit blood transfusion during the procedure  Has remained afebrile, heart rate in the 60s, blood pressure acceptable, saturating well on room air  Labs this morning showed sodium 137, potassium 4.4, chloride 102, bicarb 23, BUN 31, creatinine 3.62, WBC 10.5, hemoglobin 8.2, platelets 165  Output charted 400 cc ?   Questionable accuracy    OBJECTIVE      CURRENT TEMPERATURE:  Temp: 98.7 °F (37.1 °C)  MAXIMUM TEMPERATURE OVER 24HRS:  Temp (24hrs), Av.4 °F (36.3 °C), Min:96.5 °F (35.8 °C), Max:98.7 °F (37.1 °C)    CURRENT RESPIRATORY RATE:  Resp: 16  CURRENT PULSE:  Heart Rate: 62  CURRENT BLOOD PRESSURE:  BP: 136/62  24HR BLOOD PRESSURE RANGE:  Systolic (49LCE), RMD:836 , Min:106 , AWB:684   ; Diastolic (50FYU), EIP:29, Min:43, Max:81    24HR INTAKE/OUTPUT:    Intake/Output Summary (Last 24 hours) at 2023 1108  Last data filed at 2023 0422  Gross per 24 hour   Intake 1150 ml   Output 2700 ml   Net -1550 ml       PHYSICAL EXAM      GENERAL APPEARANCE:Awake, alert, in no acute distress  SKIN: warm and dry  EYES: conjunctivae normal  NECK:  JVD: None   PULMONARY: Clear breath sounds on auscultation bilaterally  CADRDIOVASCULAR: Normal heart sounds  ABDOMEN: Soft, nontender, nondistended  EXTREMITIES: No pedal edema    CURRENT MEDICATIONS      0.9 % sodium chloride infusion, PRN  0.9 % sodium chloride infusion, PRN  oxyCODONE (ROXICODONE) immediate release tablet 5 mg, Q6H PRN   Or  oxyCODONE (ROXICODONE) immediate release tablet 10 mg, Q4H PRN  0.9 % sodium chloride infusion, PRN  albumin human 25 % IV solution 25 g, PRN  cephALEXin (KEFLEX) capsule 500 mg, 2 times per day  epoetin pedro-epbx (RETACRIT) injection 8,000 Units, Once per day on Mon Wed Fri  heparin (porcine) injection 1,200 Units, PRN  heparin (porcine) injection 1,300 Units, PRN  midodrine (PROAMATINE) tablet 2.5 mg, TID PRN  bisacodyl (DULCOLAX) EC tablet 5 mg, Daily PRN  atorvastatin (LIPITOR) tablet 80 mg, Daily  finasteride (PROSCAR) tablet 5 mg, Daily  vitamin B-12 (CYANOCOBALAMIN) tablet 1,000 mcg, Daily  tamsulosin (FLOMAX) capsule 0.4 mg, Daily  sodium chloride flush 0.9 % injection 5-40 mL, 2 times per day  sodium chloride flush 0.9 % injection 10 mL, PRN  0.9 % sodium chloride infusion, PRN  ondansetron (ZOFRAN-ODT) disintegrating tablet 4 mg, Q8H PRN   Or  ondansetron (ZOFRAN) injection 4 mg, Q6H PRN  polyethylene glycol (GLYCOLAX) packet 17 g, Daily PRN  acetaminophen (TYLENOL) tablet 650 mg, Q6H PRN   Or  acetaminophen (TYLENOL) suppository 650 mg, Q6H PRN  morphine (PF) injection 2 mg, Q4H PRN  opium-belladonna (B&O SUPPRETTES) 16.2-60 MG suppository 60 mg, Q8H PRN  dextrose bolus 10% 125 mL, PRN   Or  dextrose bolus 10% 250 mL, PRN  glucagon (rDNA) injection 1 mg, PRN  dextrose 10 % infusion, Continuous PRN  metroNIDAZOLE (FLAGYL) tablet 500 mg, 3 times per day  insulin lispro (HUMALOG) injection vial 0-8 Units, TID WC  insulin lispro (HUMALOG) injection vial 0-4 Units, Nightly  glucose chewable tablet 16 g, PRN          LABS      CBC:   Recent Labs     02/24/23  0616 02/24/23  1630 02/25/23  0634   WBC 11.3  --  10.5   RBC 2.36*  --  2.80*   HGB 7.0* 8.6* 8.2*   HCT 23.5* 27.9* 26.6*   MCV 99.6  --  95.0   MCH 29.7  --  29.3   MCHC 29.8  --  30.8   RDW 14.3  --  15.6*     --  217   MPV 10.3  --  10.2      BMP:   Recent Labs     02/23/23  0500 02/24/23  0616 02/25/23  0634    139 137   K 4.2 4.3 4.4    103 102   CO2 22 23 23   BUN 26* 36* 31*   CREATININE 4.99* 5.32* 3.62*   GLUCOSE 90 90 155*   CALCIUM 7.6* 7.4* 9.8        JONATHAN:   Lab Results   Component Value Date    JONATHAN EQUIVOCAL (A) 02/20/2023       SPEP:   Lab Results   Component Value Date/Time    PROT 6.2 02/21/2023 05:43 AM    PATH ELECTRONICALLY SIGNED. Zane Norman M.D. 02/20/2023 04:16 PM     HEPBSAG:  Lab Results   Component Value Date/Time    HEPBSAG NONREACTIVE 02/21/2023 11:29 AM     HEPCAB:  Lab Results   Component Value Date/Time    HEPCAB NONREACTIVE 02/21/2023 11:29 AM     C3:   Lab Results   Component Value Date    C3 127 02/20/2023     C4:   Lab Results   Component Value Date    C4 31 02/20/2023     MPO ANCA:   Lab Results   Component Value Date/Time    MPO <0.3 02/20/2023 04:16 PM    .  PR3 ANCA:    Lab Results   Component Value Date/Time    PR3 <0.7 02/20/2023 04:16 PM       RADIOLOGY      Reviewed as available. ASSESSMENT      #1 acute kidney injury secondary to obstructive uropathy related to bladder clots and complicated further by hypotension/concomitant diuretic and ACE inhibitor use - initiated dialysis on 8/98/4071,  #2 metabolic acidosis - improving  #3 chronic kidney disease stage III with baseline creatinine 1.4-1.6. Never seen a nephrologist in the past  #4 hospitalized for evaluation of hematuria with background history of bladder tumor status post TURBT in the past, this post cystoscopy x2, clot evacuation, TURP, TURBT  #5 type 2 diabetes  #6 history of BPH  #7 anemia    PLAN      1. We will hold off on dialysis today   2. We will reassess in the a.m., BMP in the a.m.  3.  Strict intake and output monitoring  4. Hematuria management as per urology  5. Monitor hemoglobin and hemodynamics closely  6. We will follow      Vandana Mccord  PGY-2  Internal Medicine  [facility], [facility]   32:41 AM 2/25/2023   Attending Physician Statement  I have discussed the care of Gabe Quiroz, including pertinent history and exam findings with the resident/fellow. I have reviewed the key elements of all parts of the encounter with the resident/fellow.  I have seen and examined the patient with the resident/fellow. I agree with the assessment and plan and status of the problem list as documented.       Terry Amato MD , MD

## 2023-02-26 LAB
ANION GAP SERPL CALCULATED.3IONS-SCNC: 13 MMOL/L (ref 9–17)
BUN SERPL-MCNC: 42 MG/DL (ref 8–23)
CALCIUM SERPL-MCNC: 8 MG/DL (ref 8.6–10.4)
CHLORIDE SERPL-SCNC: 104 MMOL/L (ref 98–107)
CO2 SERPL-SCNC: 21 MMOL/L (ref 20–31)
CREAT SERPL-MCNC: 4.22 MG/DL (ref 0.7–1.2)
GFR SERPL CREATININE-BSD FRML MDRD: 14 ML/MIN/1.73M2
GLUCOSE BLD-MCNC: 117 MG/DL (ref 75–110)
GLUCOSE BLD-MCNC: 136 MG/DL (ref 75–110)
GLUCOSE BLD-MCNC: 144 MG/DL (ref 75–110)
GLUCOSE BLD-MCNC: 165 MG/DL (ref 75–110)
GLUCOSE SERPL-MCNC: 126 MG/DL (ref 70–99)
HCT VFR BLD AUTO: 29.5 % (ref 40.7–50.3)
HGB BLD-MCNC: 8.6 G/DL (ref 13–17)
MCH RBC QN AUTO: 29.3 PG (ref 25.2–33.5)
MCHC RBC AUTO-ENTMCNC: 29.2 G/DL (ref 28.4–34.8)
MCV RBC AUTO: 100.3 FL (ref 82.6–102.9)
NRBC AUTOMATED: 0 PER 100 WBC
PDW BLD-RTO: 15.4 % (ref 11.8–14.4)
PLATELET # BLD AUTO: 250 K/UL (ref 138–453)
PMV BLD AUTO: 10 FL (ref 8.1–13.5)
POTASSIUM SERPL-SCNC: 3.9 MMOL/L (ref 3.7–5.3)
RBC # BLD: 2.94 M/UL (ref 4.21–5.77)
SODIUM SERPL-SCNC: 138 MMOL/L (ref 135–144)
WBC # BLD AUTO: 13.2 K/UL (ref 3.5–11.3)

## 2023-02-26 PROCEDURE — 94760 N-INVAS EAR/PLS OXIMETRY 1: CPT

## 2023-02-26 PROCEDURE — 99232 SBSQ HOSP IP/OBS MODERATE 35: CPT | Performed by: STUDENT IN AN ORGANIZED HEALTH CARE EDUCATION/TRAINING PROGRAM

## 2023-02-26 PROCEDURE — 80048 BASIC METABOLIC PNL TOTAL CA: CPT

## 2023-02-26 PROCEDURE — 85027 COMPLETE CBC AUTOMATED: CPT

## 2023-02-26 PROCEDURE — 6370000000 HC RX 637 (ALT 250 FOR IP): Performed by: STUDENT IN AN ORGANIZED HEALTH CARE EDUCATION/TRAINING PROGRAM

## 2023-02-26 PROCEDURE — 1200000000 HC SEMI PRIVATE

## 2023-02-26 PROCEDURE — 2580000003 HC RX 258: Performed by: STUDENT IN AN ORGANIZED HEALTH CARE EDUCATION/TRAINING PROGRAM

## 2023-02-26 PROCEDURE — 36415 COLL VENOUS BLD VENIPUNCTURE: CPT

## 2023-02-26 PROCEDURE — 51798 US URINE CAPACITY MEASURE: CPT

## 2023-02-26 PROCEDURE — 82947 ASSAY GLUCOSE BLOOD QUANT: CPT

## 2023-02-26 PROCEDURE — 99231 SBSQ HOSP IP/OBS SF/LOW 25: CPT | Performed by: INTERNAL MEDICINE

## 2023-02-26 RX ADMIN — CEPHALEXIN 500 MG: 500 CAPSULE ORAL at 21:15

## 2023-02-26 RX ADMIN — FINASTERIDE 5 MG: 5 TABLET, FILM COATED ORAL at 09:05

## 2023-02-26 RX ADMIN — ACETAMINOPHEN 650 MG: 325 TABLET ORAL at 13:43

## 2023-02-26 RX ADMIN — SODIUM CHLORIDE, PRESERVATIVE FREE 10 ML: 5 INJECTION INTRAVENOUS at 09:15

## 2023-02-26 RX ADMIN — TAMSULOSIN HYDROCHLORIDE 0.4 MG: 0.4 CAPSULE ORAL at 09:05

## 2023-02-26 RX ADMIN — ACETAMINOPHEN 650 MG: 325 TABLET ORAL at 06:18

## 2023-02-26 RX ADMIN — CEPHALEXIN 500 MG: 500 CAPSULE ORAL at 09:05

## 2023-02-26 RX ADMIN — SODIUM CHLORIDE, PRESERVATIVE FREE 10 ML: 5 INJECTION INTRAVENOUS at 21:15

## 2023-02-26 RX ADMIN — METRONIDAZOLE 500 MG: 500 TABLET, FILM COATED ORAL at 09:04

## 2023-02-26 RX ADMIN — Medication 1000 MCG: at 09:04

## 2023-02-26 RX ADMIN — ACETAMINOPHEN 650 MG: 325 TABLET ORAL at 21:19

## 2023-02-26 RX ADMIN — METRONIDAZOLE 500 MG: 500 TABLET, FILM COATED ORAL at 17:20

## 2023-02-26 RX ADMIN — ATORVASTATIN CALCIUM 80 MG: 80 TABLET, FILM COATED ORAL at 09:05

## 2023-02-26 RX ADMIN — METRONIDAZOLE 500 MG: 500 TABLET, FILM COATED ORAL at 01:14

## 2023-02-26 ASSESSMENT — PAIN DESCRIPTION - LOCATION
LOCATION: LEG
LOCATION: LEG
LOCATION: PENIS
LOCATION: LEG

## 2023-02-26 ASSESSMENT — PAIN DESCRIPTION - DESCRIPTORS
DESCRIPTORS: ACHING

## 2023-02-26 ASSESSMENT — PAIN SCALES - GENERAL
PAINLEVEL_OUTOF10: 6
PAINLEVEL_OUTOF10: 6
PAINLEVEL_OUTOF10: 3
PAINLEVEL_OUTOF10: 4
PAINLEVEL_OUTOF10: 7

## 2023-02-26 ASSESSMENT — PAIN DESCRIPTION - PAIN TYPE
TYPE: ACUTE PAIN

## 2023-02-26 ASSESSMENT — PAIN DESCRIPTION - ORIENTATION
ORIENTATION: LEFT;RIGHT
ORIENTATION: RIGHT;LEFT
ORIENTATION: RIGHT;LEFT

## 2023-02-26 ASSESSMENT — PAIN DESCRIPTION - ONSET
ONSET: ON-GOING

## 2023-02-26 ASSESSMENT — PAIN - FUNCTIONAL ASSESSMENT
PAIN_FUNCTIONAL_ASSESSMENT: PREVENTS OR INTERFERES SOME ACTIVE ACTIVITIES AND ADLS

## 2023-02-26 ASSESSMENT — PAIN DESCRIPTION - FREQUENCY
FREQUENCY: INTERMITTENT

## 2023-02-26 NOTE — PROGRESS NOTES
Urology Progress Note      Subjective: Gabe Quiroz is a [de-identified] y.o. male. His/Her current Diet is: ADULT DIET; Regular. Since the previous note, the patient reports the following: Mason catheter was removed yesterday and he voided with minimal PVRs  No pain  No fevers or chills. No nausea or vomiting. Vitals and Labs:  Vitals:    02/25/23 1721 02/25/23 2002 02/26/23 0000 02/26/23 0702   BP: (!) 104/55 (!) 106/50 (!) 114/58 127/66   Pulse:  61 52 53   Resp:  17  18   Temp:  97.8 °F (36.6 °C)  97.5 °F (36.4 °C)   TempSrc:  Oral  Oral   SpO2:  92%  96%   Weight:       Height:         I/O last 3 completed shifts:  In: -   Out: 845 [Urine:845]    Recent Labs     02/24/23  0616 02/24/23  1630 02/25/23  0634 02/26/23  0625   WBC 11.3  --  10.5 13.2*   HGB 7.0* 8.6* 8.2* 8.6*   HCT 23.5* 27.9* 26.6* 29.5*   MCV 99.6  --  95.0 100.3     --  217 250     Recent Labs     02/24/23  0616 02/25/23  0634 02/26/23  0625    137 138   K 4.3 4.4 3.9    102 104   CO2 23 23 21   BUN 36* 31* 42*   CREATININE 5.32* 3.62* 4.22*       No results for input(s): COLORU, PHUR, LABCAST, WBCUA, RBCUA, MUCUS, TRICHOMONAS, YEAST, BACTERIA, CLARITYU, SPECGRAV, LEUKOCYTESUR, UROBILINOGEN, BILIRUBINUR, BLOODU in the last 72 hours. Invalid input(s): NITRATE, GLUCOSEUKETONESUAMORPHOUS    Physical Exam:  NAD  A/O x 3  RRR  No accessory muscles of inspiration  Abdomen soft, non-tender, non-distended. No CVA tenderness. Mason pink urine output on moderate CBI    Impression:  [de-identified] yo male with  Clot retention  Atrial fibrillation on coumadin  Recent urolift implantation on 1/31/2023  History of bladder cancer s/p TURBT in 9/2022, repeat TURBT on 2/23/2023 and 2/24/2023    Plan:   Mason out, voiding with acceptable PVRs and clear urine  Hb 8.6 from 8.2  Continue to hold Coumadin for 5 more days  Okay to discharge from urology standpoint      Miles Vallejo MD  8:57 AM 2/26/2023

## 2023-02-26 NOTE — PROGRESS NOTES
Nephrology Progress Note      SUBJECTIVE      Patient seen and examined bedside. Mason catheter was removed yesterday. He had low urine output last night, bladder scans have shown about 100ml today, UA urine clear yellow, he does admit to difficulty starting urinary stream.  Creatinine has gone up from 3.6 yesterday to 4.2 today. He has received hemodialysis from -23, temporary dialysis cath in place  Repeat cystoscopy Friday for repeat TURBT, did received 1 unit blood transfusion during the procedure. Urology is holding Coumadin for 5 more days.   Has remained afebrile, heart rate in the 60s, blood pressure acceptable, saturating well on room air    Labs this morning showed sodium 138, potassium 3.9, chloride 104, bicarb 21, creatinine 4.22, up from 3.6 yesterday, hemoglobin 8.6    OBJECTIVE      CURRENT TEMPERATURE:  Temp: 97.6 °F (36.4 °C)  MAXIMUM TEMPERATURE OVER 24HRS:  Temp (24hrs), Av.9 °F (36.6 °C), Min:97.5 °F (36.4 °C), Max:98.2 °F (36.8 °C)    CURRENT RESPIRATORY RATE:  Resp: 18  CURRENT PULSE:  Heart Rate: 55  CURRENT BLOOD PRESSURE:  BP: 129/70  24HR BLOOD PRESSURE RANGE:  Systolic (04SXM), RYA:364 , Min:99 , KM   ; Diastolic (51QTG), SM, Min:47, Max:70    24HR INTAKE/OUTPUT:    Intake/Output Summary (Last 24 hours) at 2023 1136  Last data filed at 2023 1009  Gross per 24 hour   Intake --   Output 545 ml   Net -545 ml         PHYSICAL EXAM      GENERAL APPEARANCE:Awake, alert, in no acute distress  SKIN: warm and dry  EYES: conjunctivae normal  NECK:  JVD: None   PULMONARY: Clear breath sounds on auscultation bilaterally  CADRDIOVASCULAR: Normal heart sounds, dialysis catheter in place, no signs of infection  ABDOMEN: Soft, nontender, nondistended  EXTREMITIES: No pedal edema    CURRENT MEDICATIONS      0.9 % sodium chloride infusion, PRN  0.9 % sodium chloride infusion, PRN  oxyCODONE (ROXICODONE) immediate release tablet 5 mg, Q6H PRN   Or  oxyCODONE (ROXICODONE) immediate release tablet 10 mg, Q4H PRN  0.9 % sodium chloride infusion, PRN  albumin human 25 % IV solution 25 g, PRN  cephALEXin (KEFLEX) capsule 500 mg, 2 times per day  epoetin pedro-epbx (RETACRIT) injection 8,000 Units, Once per day on Mon Wed Fri  heparin (porcine) injection 1,200 Units, PRN  heparin (porcine) injection 1,300 Units, PRN  midodrine (PROAMATINE) tablet 2.5 mg, TID PRN  bisacodyl (DULCOLAX) EC tablet 5 mg, Daily PRN  atorvastatin (LIPITOR) tablet 80 mg, Daily  finasteride (PROSCAR) tablet 5 mg, Daily  vitamin B-12 (CYANOCOBALAMIN) tablet 1,000 mcg, Daily  tamsulosin (FLOMAX) capsule 0.4 mg, Daily  sodium chloride flush 0.9 % injection 5-40 mL, 2 times per day  sodium chloride flush 0.9 % injection 10 mL, PRN  0.9 % sodium chloride infusion, PRN  ondansetron (ZOFRAN-ODT) disintegrating tablet 4 mg, Q8H PRN   Or  ondansetron (ZOFRAN) injection 4 mg, Q6H PRN  polyethylene glycol (GLYCOLAX) packet 17 g, Daily PRN  acetaminophen (TYLENOL) tablet 650 mg, Q6H PRN   Or  acetaminophen (TYLENOL) suppository 650 mg, Q6H PRN  morphine (PF) injection 2 mg, Q4H PRN  opium-belladonna (B&O SUPPRETTES) 16.2-60 MG suppository 60 mg, Q8H PRN  dextrose bolus 10% 125 mL, PRN   Or  dextrose bolus 10% 250 mL, PRN  glucagon (rDNA) injection 1 mg, PRN  dextrose 10 % infusion, Continuous PRN  metroNIDAZOLE (FLAGYL) tablet 500 mg, 3 times per day  insulin lispro (HUMALOG) injection vial 0-8 Units, TID WC  insulin lispro (HUMALOG) injection vial 0-4 Units, Nightly  glucose chewable tablet 16 g, PRN        LABS      CBC:   Recent Labs     02/24/23  0616 02/24/23  1630 02/25/23  0634 02/26/23  0625   WBC 11.3  --  10.5 13.2*   RBC 2.36*  --  2.80* 2.94*   HGB 7.0* 8.6* 8.2* 8.6*   HCT 23.5* 27.9* 26.6* 29.5*   MCV 99.6  --  95.0 100.3   MCH 29.7  --  29.3 29.3   MCHC 29.8  --  30.8 29.2   RDW 14.3  --  15.6* 15.4*     --  217 250   MPV 10.3  --  10.2 10.0        BMP:   Recent Labs     02/24/23  0616 02/25/23  4722 02/26/23  0625    137 138   K 4.3 4.4 3.9    102 104   CO2 23 23 21   BUN 36* 31* 42*   CREATININE 5.32* 3.62* 4.22*   GLUCOSE 90 155* 126*   CALCIUM 7.4* 9.8 8.0*          JONATHAN:   Lab Results   Component Value Date    JONATHAN EQUIVOCAL (A) 02/20/2023       SPEP:   Lab Results   Component Value Date/Time    PROT 6.2 02/21/2023 05:43 AM    PATH ELECTRONICALLY SIGNED. Chino Dee M.D. 02/20/2023 04:16 PM     HEPBSAG:  Lab Results   Component Value Date/Time    HEPBSAG NONREACTIVE 02/21/2023 11:29 AM     HEPCAB:  Lab Results   Component Value Date/Time    HEPCAB NONREACTIVE 02/21/2023 11:29 AM     C3:   Lab Results   Component Value Date    C3 127 02/20/2023     C4:   Lab Results   Component Value Date    C4 31 02/20/2023     MPO ANCA:   Lab Results   Component Value Date/Time    MPO <0.3 02/20/2023 04:16 PM    .  PR3 ANCA:    Lab Results   Component Value Date/Time    PR3 <0.7 02/20/2023 04:16 PM       RADIOLOGY      Reviewed as available. ASSESSMENT      1 acute kidney injury secondary to obstructive uropathy related to bladder clots and complicated further by hypotension/concomitant diuretic and ACE inhibitor use - initiated dialysis on 2/21/2023, last HD was 5/29/91  2 metabolic acidosis - improving  3 chronic kidney disease stage III with baseline creatinine 1.4-1.6. Never seen a nephrologist in the past  4 hospitalized for evaluation of hematuria with background history of bladder tumor status post TURBT in the past, this post cystoscopy x2, clot evacuation, TURP, TURBT  5 type 2 diabetes  6 history of BPH  7 anemia    PLAN      1. We will hold off on dialysis today   2. We will reassess in the a.m., BMP in the a.m.  3.  Strict intake and output monitoring  4. Hematuria management as per urology. Hematuria appears to be much improved  5. Monitor hemoglobin and hemodynamics closely  6.   We will follow        KATT Moreno CNP, MD  Attending Physician Statement  I have discussed the care of [facility], including pertinent history and exam findings with the resident/fellow. I have reviewed the key elements of all parts of the encounter with the resident/fellow. I have seen and examined the patient with the resident/fellow. I agree with the assessment and plan and status of the problem list as documented. Patient seen and examined. His Mason catheter has been removed. He is voiding small amounts by self. Gross hematuria seems to be much improved. Fortunately creatinine is still elevated at 4.2 and is actually up from yesterday reflecting lack of clearances. Will review labs tomorrow and decide on dialysis. Currently has a temporary catheter. If he is to need ongoing dialysis this catheter will need to be switched over to a PermCath.   Following closely with you    Luba Castellano MD , MD

## 2023-02-26 NOTE — PROGRESS NOTES
[facility]  Office: [address], DO, Karentracey Barragans, DO, Drew Laguerre, DO, Darleen Portillophil Blood, DO, Raeford Goodell, MD, Parisa Alfredo MD, Asuncion Chávez MD, Aaliyah Jorge MD,  Marixa Palmer MD, Joseph Weston MD, Cierra Jorgensen, DO, Dexter Azar MD,  Ludwig Buckley MD, Donna Marr MD, Rebecca Hendrix, DO, Lev Swenson MD, Alexander Manzano MD, Ary Myers DO, Asael Estevez MD, Randall Franks MD, Donita Cuadra MD, Dagoberto Kimbrough MD, Carey Walls, DO, Migue Jauregui MD, Marylu Booth MD, Amy Rios, CNP,  Gabe Patel, CNP, Osorio Young, CNP, Izabel Bonilla, CNP,  Roberto Pathak, [facility], Bernardo Nelson, CNP, Wetzel Claude, CNP, Savana Renteria, CNP, Mathew Agustin, CNP, Marielena Gonzalez, CNP, TAMMY CarolinaC, Susi Andrea, CNS, Abdulkadir Brian, [facility], Klarissa Hirsch, [facility]    Progress Note    2/26/2023    7:37 AM    Name:   Nina Foster  MRN:     [id number]     Acct:      [de-identified]   Room:   [address] [place] [address] Day:  7  Admit Date:  2/19/2023 12:32 AM    PCP:   Neeru Powell  Code Status:  Full Code    Subjective:     C/C:   Chief Complaint   Patient presents with    Hematuria     Transfer from [facility] d/t hematuria. Pt with history of cystoscopy with bladder bioposy and fulguration, urolift x 4  per Dr. Siria Kaufman on 01/31/023. Pt with urinary retention, dribbling and passing blood clots today. Pt with history of atrial fibrillation, currently on Coumadin. INR-3.71 today. Pt reports he has held his coumadin for the past three days. Pt arrives with casey catheter in place with scant amount of bright red blood. Interval History Status:     Seen at bedside  Waiting for final input urology nephro before dc  Can do home care    Brief History:     60-year-old male transferred from [facility] secondary to hematuria.   He does have a history of recent cystoscopy with bladder biopsy and fulguration, UroLift x4 with Dr. Des Beth. This was on 2023. He is on Coumadin for atrial fibrillation with a supratherapeutic INR on admission. Currently is being held his most recent INR is 2.8. His chief complaint on arrival was difficulty in urination and noticing blood in his urine    Past Medical History:   has a past medical history of Atrial fibrillation ([facility] Benign prostatic hyperplasia with urinary frequency, Cancer ([facility] Cardiomyopathy ([facility] [facility] CKD (chronic kidney disease), Coronary atherosclerosis, Gout, Morbid obesity with body mass index (BMI) of 45.0 to 49.9 in [facility]), Obesity, Pulmonary hypertension ([facility] PVD (peripheral vascular disease) ([facility] Right ventricular dilation, Tricuspid regurgitation, and Type 2 diabetes mellitus with diabetic polyneuropathy ([facility] Social History:   reports that he has never smoked. He has never used smokeless tobacco. He reports that he does not currently use alcohol. He reports that he does not use drugs. Family History:   Family History   Problem Relation Age of Onset    Heart Disease Mother     Breast Cancer Sister     Cancer Brother         prostate       Review of Systems:     12 point ROS negative other than what is noted in brief history  He does have some difficulty with urination and is feeling ill in general  Has also noted blood in his urine    Physical Examination:        Vitals  /66   Pulse 53   Temp 97.5 °F (36.4 °C) (Oral)   Resp 18   Ht 5' 11\" (1.803 m)   Wt (!) 328 lb 7.8 oz (149 kg)   SpO2 96%   BMI 45.81 kg/m²   Temp (24hrs), Av.1 °F (36.7 °C), Min:97.5 °F (36.4 °C), Max:98.7 °F (37.1 °C)    Recent Labs     23  1201 23  1621 23  0717   POCGLU 303* 135* 183* 117*         Physical Exam  Vitals reviewed. Constitutional:       Appearance: Normal appearance. He is ill-appearing. HENT:      Head: Normocephalic.    Eyes:      Extraocular Movements: Extraocular movements intact. Conjunctiva/sclera: Conjunctivae normal.   Cardiovascular:      Rate and Rhythm: Normal rate and regular rhythm. Pulses: Normal pulses. Heart sounds: Normal heart sounds. Pulmonary:      Effort: Pulmonary effort is normal.      Breath sounds: Normal breath sounds. Abdominal:      Comments: Slightly distended abdomen   Neurological:      General: No focal deficit present. Mental Status: He is alert. Psychiatric:         Mood and Affect: Mood normal.         Behavior: Behavior normal.         Thought Content: Thought content normal.         Judgment: Judgment normal.     Alert and oriented had some confusion    Medications: Allergies:  No Known Allergies    Current Meds:   Scheduled Meds:    cephALEXin  500 mg Oral 2 times per day    epoetin pedro-epbx  8,000 Units IntraVENous Once per day on Mon Wed Fri    atorvastatin  80 mg Oral Daily    finasteride  5 mg Oral Daily    vitamin B-12  1,000 mcg Oral Daily    tamsulosin  0.4 mg Oral Daily    sodium chloride flush  5-40 mL IntraVENous 2 times per day    metroNIDAZOLE  500 mg Oral 3 times per day    insulin lispro  0-8 Units SubCUTAneous TID WC    insulin lispro  0-4 Units SubCUTAneous Nightly     Continuous Infusions:    sodium chloride      sodium chloride      sodium chloride      sodium chloride      dextrose       PRN Meds: sodium chloride, sodium chloride, oxyCODONE **OR** oxyCODONE, sodium chloride, albumin human, heparin (porcine), heparin (porcine), midodrine, bisacodyl, sodium chloride flush, sodium chloride, ondansetron **OR** ondansetron, polyethylene glycol, acetaminophen **OR** acetaminophen, morphine, opium-belladonna, dextrose bolus **OR** dextrose bolus, glucagon (rDNA), dextrose, glucose    Data:     I/O (24Hr):     Intake/Output Summary (Last 24 hours) at 2/26/2023 0737  Last data filed at 2/26/2023 5759  Gross per 24 hour   Intake --   Output 445 ml   Net -445 ml         Labs:  Hematology:  Recent Labs 02/24/23  0616 02/24/23  1630 02/25/23  0634 02/26/23  0625   WBC 11.3  --  10.5 13.2*   RBC 2.36*  --  2.80* 2.94*   HGB 7.0* 8.6* 8.2* 8.6*   HCT 23.5* 27.9* 26.6* 29.5*   MCV 99.6  --  95.0 100.3   MCH 29.7  --  29.3 29.3   MCHC 29.8  --  30.8 29.2   RDW 14.3  --  15.6* 15.4*     --  217 250   MPV 10.3  --  10.2 10.0       Chemistry:  Recent Labs     02/24/23  0616 02/25/23  0634 02/26/23  0625    137 138   K 4.3 4.4 3.9    102 104   CO2 23 23 21   GLUCOSE 90 155* 126*   BUN 36* 31* 42*   CREATININE 5.32* 3.62* 4.22*   ANIONGAP 13 12 13   LABGLOM 10* 16* 14*   CALCIUM 7.4* 9.8 8.0*       Recent Labs     02/25/23  0659 02/25/23  1136 02/25/23  1201 02/25/23  1621 02/25/23 2007 02/26/23  0717   POCGLU 144* 278* 303* 135* 183* 117*       ABG:No results found for: POCPH, PHART, PH, POCPCO2, ILD5WOR, PCO2, POCPO2, PO2ART, PO2, POCHCO3, NYD1YCW, HCO3, NBEA, PBEA, BEART, BE, THGBART, THB, AXN1IFO, PGSA4PDN, M2UASBPV, O2SAT, FIO2  No results found for: SPECIAL  No results found for: CULTURE    Radiology:  No results found.    Assessment:        Primary Problem  Clot retention of urine    Active Hospital Problems    Diagnosis Date Noted    Hx of type 2 diabetes mellitus [Z86.39] 02/22/2023     Priority: Medium    Hx of chronic kidney disease [Z87.448] 02/22/2023     Priority: Medium    Stage 3a chronic kidney disease (HCC) [N18.31] 02/21/2023     Priority: Medium    Supratherapeutic INR [R79.1] 02/20/2023     Priority: Medium    Orthostatic hypotension [I95.1] 02/20/2023     Priority: Medium    Acute blood loss anemia: Due to hematuria/bladder cancer [D62] 02/20/2023     Priority: Medium    Urinary retention [R33.9] 02/20/2023     Priority: Medium    Clot retention of urine [R33.8] 02/19/2023     Priority: Medium    Gross hematuria [R31.0] 02/19/2023     Priority: Medium    On continuous oral anticoagulation [Z79.01] 02/19/2023     Priority: Medium    Colitis [K52.9] 02/19/2023     Priority: Medium     Bladder carcinoma ([facility] [C67.9] 02/19/2023     Priority: Medium    MATTI (acute kidney injury) ([facility] [N17.9] 02/19/2023     Priority: Medium    Hyperkalemia [E87.5] 02/19/2023     Priority: Medium    Atrial fibrillation ([facility] [I48.91] 07/01/2022     Priority: Medium    Type 2 diabetes mellitus with diabetic polyneuropathy ([facility] [E11.42]      Priority: Medium         Plan:        Postoperative day #2 status post cystoscopy with clot evacuation fulguration transurethral resection of bladder tissue and prostate  Continue to hold anticoagulation  Further recommendations from urology to follow regarding surgery or not  If no surgery planned patient can be discharged next 24 to 48 hours  Temp dialysis catheter for renal failure - pt has recovered some renal function, anuric, may not need long term dialysis   Worsening cr yesterday, very poor residual kidney function  Bgm adequate watch for hypoglycemia  Hgb this AM much improved, urine looks more clear - holding AC 5 total days      IP CONSULT TO UROLOGY  IP CONSULT TO HOSPITALIST  IP CONSULT TO UROLOGY  IP CONSULT TO NEPHROLOGY  IP CONSULT TO IV TEAM    Tianna Rodriguez MD  2/26/2023  7:37 AM

## 2023-02-26 NOTE — CARE COORDINATION
Transitional Planning:    Pt S/P cyctoscopy w clot evac, fulguration transurethral resection of bladder tissue and prostate    Urology following, Nephrology following - temp cath in place, IM following, anticipate discharge, labs pending, will need HC order and OLY.

## 2023-02-26 NOTE — DISCHARGE INSTR - COC
Continuity of Care Form    Patient Name: Guadalupe Alejandre   :  1940  MRN:  [id number]    Admit date:  2023  Discharge date:  23  --  Code Status Order: Full Code   Advance Directives:     Admitting Physician:  Carlo Crawford DO  PCP: Ana Sims    Discharging Nurse: [facility] Unit/Room#: 4566/8031-18  Discharging Unit Phone Number: [phone]    Emergency Contact:   Extended Emergency Contact Information  Primary Emergency Contact: Tiffanie Huitron  Home Phone: [phone]  Mobile Phone: [phone]  Relation: Child    Past Surgical History:  Past Surgical History:   Procedure Laterality Date    BACK SURGERY      three times    COLONOSCOPY N/A     . 's in [facility].    COLONOSCOPY W/ BIOPSIES N/A 2022    diverticulosis, x 4 adenoma polyps removed, done @ [facility] by Dr. Robby Franz.     CYSTOSCOPY      CYSTOSCOPY      w bladder biospy    CYSTOSCOPY N/A 2022    CYSTOSCOPY TRANSURETHRAL RESECTION BLADDER WITH INSTILLATION GEMCITIBINE performed by Jeff Puente., MD at [address]  2023    CYSTOSCOPY EVACUATION OF CLOTS, TUR BLADDER TUMOR    CYSTOSCOPY N/A 2023    CYSTOSCOPY EVACUATION OF CLOTS, TUR BLADDER TUMOR performed by Jeff Puente., MD at [address]      IR NONTUNNELED VASCULAR CATHETER  2023    IR NONTUNNELED VASCULAR CATHETER 2023 [facility] PROCEDURES    KNEE ARTHROPLASTY Left     TRANSURETHRAL RESECTION OF BLADDER TUMOR      TRANSURETHRAL RESECTION OF BLADDER TUMOR N/A 2023       Immunization History:   Immunization History   Administered Date(s) Administered    COVID-19, MODERNA BLUE border, Primary or Immunocompromised, (age 12y+), IM, 100 mcg/0.5mL 02/10/2021, 2021    COVID-19, MODERNA Booster BLUE border, (age 18y+), IM, 50mcg/0.25mL 2021       Active Problems:  Patient Active Problem List   Diagnosis Code    Atrial fibrillation ([facility] I48.91    Benign prostatic hyperplasia with urinary frequency N40.1, R35.0    Cardiomyopathy ([facility] I42.9    Coronary atherosclerosis I25.10    Gout M10.9    Obesity E66.9    PVD (peripheral vascular disease) (HCC) I73.9    Right ventricular dilation I51.7    Tricuspid regurgitation I07.1    Type 2 diabetes mellitus with diabetic polyneuropathy (HCC) E11.42    Malignant neoplasm of overlapping sites of bladder (HCC) C67.8    Clot retention of urine R33.8    Gross hematuria R31.0    On continuous oral anticoagulation Z79.01    Colitis K52.9    Bladder carcinoma (HCC) C67.9    MATTI (acute kidney injury) ([facility] N17.9    Hyperkalemia E87.5    Supratherapeutic INR R79.1    Orthostatic hypotension I95.1    Acute blood loss anemia: Due to hematuria/bladder cancer D62    Urinary retention R33.9    Stage 3a chronic kidney disease (HCC) N18.31    Hx of type 2 diabetes mellitus Z86.39    Hx of chronic kidney disease Z87.448       Isolation/Infection:   Isolation            No Isolation          Patient Infection Status       None to display            Nurse Assessment:  Last Vital Signs: /66   Pulse 53   Temp 97.5 °F (36.4 °C) (Oral)   Resp 18   Ht 5' 11\" (1.803 m)   Wt (!) 328 lb 7.8 oz (149 kg)   SpO2 96%   BMI 45.81 kg/m²     Last documented pain score (0-10 scale): Pain Level: 6  Last Weight:   Wt Readings from Last 1 Encounters:   02/24/23 (!) 328 lb 7.8 oz (149 kg)     Mental Status:  oriented and alert    IV Access:  Dialysis tunneled catheter, placed on 2-28-23    Nursing Mobility/ADLs:  Walking   Dependent  Transfer  Assisted  Bathing  Assisted  Dressing  Assisted  Toileting  Assisted  Feeding  Independent  Med Admin  Assisted  Med Delivery   whole    Wound Care Documentation and Therapy:  Puncture 09/23/22 Penis (Active)   Number of days: 156        Elimination:  Continence:    Bowel: Yes  Bladder: Yes  Urinary Catheter: None   Colostomy/Ileostomy/Ileal Conduit: No       Date of Last BM: 2-20-23    Intake/Output Summary (Last 24 hours) at 2/26/2023 0930  Last data filed at 2/26/2023 1818  Gross per 24 hour   Intake --   Output 470 ml   Net -470 ml     I/O last 3 completed shifts:  In: -   Out: [facility] [Urine:845]    Safety Concerns: At Risk for Falls    Impairments/Disabilities:      Difficulty walking, dialysis mon-wed-fri    Nutrition Therapy:  Current Nutrition Therapy:   - Oral Diet:  Carb Control 4 carbs/meal (1800kcals/day)    Routes of Feeding: Oral  Liquids: No Restrictions  Daily Fluid Restriction: no  Last Modified Barium Swallow with Video (Video Swallowing Test): not done    Treatments at the Time of Hospital Discharge:   Respiratory Treatments: none  Oxygen Therapy:  is not on home oxygen therapy. Ventilator:    - No ventilator support    Rehab Therapies: Physical Therapy and Occupational Therapy  Weight Bearing Status/Restrictions: No weight bearing restrictions  Other Medical Equipment (for information only, NOT a DME order):  wheelchair and walker  Other Treatments: Skilled Nurse to eval and treat. HHA to assist with ADLS. Patient's personal belongings (please select all that are sent with patient):  None    RN SIGNATURE:  Electronically signed by Chauncey Will RN on 2/28/23 at 4:25 PM EST    CASE MANAGEMENT/SOCIAL WORK SECTION    Inpatient Status Date: 2-    Readmission Risk Assessment Score:  Readmission Risk              Risk of Unplanned Readmission:  22           Discharging to Facility/ Agency   Name:  [facility]ina [facility]  Address: [address] [place] [address]  Phone: [phone]  Fax:    Dialysis Facility (if applicable)   Name:[facility] [place]  Dialysis Schedule:Mon-Wed-Fri @ 5:15am  Phone:[phone]  Fax:    / signature: Electronically signed by Mayra Rodriguez RN on 2/26/23 at 9:32 AM EST    PHYSICIAN SECTION    Prognosis: Fair    Condition at Discharge: Stable    Rehab Potential (if transferring to Rehab):  Fair    Recommended Labs or Other Treatments After Discharge:     Physician Certification: I certify the above information and transfer of Rakesh Anderson  is necessary for the continuing treatment of the diagnosis listed and that he requires [address] for less 30 days.      Update Admission H&P: No change in H&P    PHYSICIAN SIGNATURE:  Electronically signed by Hever Agudelo MD on 2/26/23 at 10:09 AM EST  Electronically signed by Antonella Polanco MD on 2/28/2023 at 11:52 AM

## 2023-02-27 PROBLEM — N32.89 BLADDER MASS: Status: ACTIVE | Noted: 2023-02-27

## 2023-02-27 PROBLEM — Z99.2 DEPENDENCE ON RENAL DIALYSIS (HCC): Status: ACTIVE | Noted: 2023-02-27

## 2023-02-27 PROBLEM — E87.20 METABOLIC ACIDOSIS: Status: ACTIVE | Noted: 2023-02-27

## 2023-02-27 LAB
ANION GAP SERPL CALCULATED.3IONS-SCNC: 13 MMOL/L (ref 9–17)
BUN SERPL-MCNC: 45 MG/DL (ref 8–23)
CALCIUM SERPL-MCNC: 8.1 MG/DL (ref 8.6–10.4)
CHLORIDE SERPL-SCNC: 104 MMOL/L (ref 98–107)
CO2 SERPL-SCNC: 23 MMOL/L (ref 20–31)
CREAT SERPL-MCNC: 3.99 MG/DL (ref 0.7–1.2)
EST. AVERAGE GLUCOSE BLD GHB EST-MCNC: 120 MG/DL
GFR SERPL CREATININE-BSD FRML MDRD: 14 ML/MIN/1.73M2
GLUCOSE BLD-MCNC: 134 MG/DL (ref 75–110)
GLUCOSE BLD-MCNC: 156 MG/DL (ref 75–110)
GLUCOSE BLD-MCNC: 184 MG/DL (ref 75–110)
GLUCOSE SERPL-MCNC: 127 MG/DL (ref 70–99)
HBA1C MFR BLD: 5.8 % (ref 4–6)
HCT VFR BLD AUTO: 31.6 % (ref 40.7–50.3)
HGB BLD-MCNC: 9.4 G/DL (ref 13–17)
MCH RBC QN AUTO: 29.4 PG (ref 25.2–33.5)
MCHC RBC AUTO-ENTMCNC: 29.7 G/DL (ref 28.4–34.8)
MCV RBC AUTO: 98.8 FL (ref 82.6–102.9)
NRBC AUTOMATED: 0 PER 100 WBC
PDW BLD-RTO: 15.4 % (ref 11.8–14.4)
PLATELET # BLD AUTO: 198 K/UL (ref 138–453)
PMV BLD AUTO: 9.8 FL (ref 8.1–13.5)
POTASSIUM SERPL-SCNC: 3.9 MMOL/L (ref 3.7–5.3)
RBC # BLD: 3.2 M/UL (ref 4.21–5.77)
SODIUM SERPL-SCNC: 140 MMOL/L (ref 135–144)
WBC # BLD AUTO: 11.3 K/UL (ref 3.5–11.3)

## 2023-02-27 PROCEDURE — 6370000000 HC RX 637 (ALT 250 FOR IP): Performed by: STUDENT IN AN ORGANIZED HEALTH CARE EDUCATION/TRAINING PROGRAM

## 2023-02-27 PROCEDURE — 85027 COMPLETE CBC AUTOMATED: CPT

## 2023-02-27 PROCEDURE — 90935 HEMODIALYSIS ONE EVALUATION: CPT | Performed by: INTERNAL MEDICINE

## 2023-02-27 PROCEDURE — 80048 BASIC METABOLIC PNL TOTAL CA: CPT

## 2023-02-27 PROCEDURE — 36415 COLL VENOUS BLD VENIPUNCTURE: CPT

## 2023-02-27 PROCEDURE — 90935 HEMODIALYSIS ONE EVALUATION: CPT

## 2023-02-27 PROCEDURE — 6360000002 HC RX W HCPCS: Performed by: STUDENT IN AN ORGANIZED HEALTH CARE EDUCATION/TRAINING PROGRAM

## 2023-02-27 PROCEDURE — 83036 HEMOGLOBIN GLYCOSYLATED A1C: CPT

## 2023-02-27 PROCEDURE — 82947 ASSAY GLUCOSE BLOOD QUANT: CPT

## 2023-02-27 PROCEDURE — 1200000000 HC SEMI PRIVATE

## 2023-02-27 PROCEDURE — 2580000003 HC RX 258: Performed by: STUDENT IN AN ORGANIZED HEALTH CARE EDUCATION/TRAINING PROGRAM

## 2023-02-27 PROCEDURE — 99232 SBSQ HOSP IP/OBS MODERATE 35: CPT | Performed by: STUDENT IN AN ORGANIZED HEALTH CARE EDUCATION/TRAINING PROGRAM

## 2023-02-27 RX ADMIN — Medication 1000 MCG: at 08:02

## 2023-02-27 RX ADMIN — METRONIDAZOLE 500 MG: 500 TABLET, FILM COATED ORAL at 00:50

## 2023-02-27 RX ADMIN — METRONIDAZOLE 500 MG: 500 TABLET, FILM COATED ORAL at 08:02

## 2023-02-27 RX ADMIN — SODIUM CHLORIDE, PRESERVATIVE FREE 10 ML: 5 INJECTION INTRAVENOUS at 21:24

## 2023-02-27 RX ADMIN — FINASTERIDE 5 MG: 5 TABLET, FILM COATED ORAL at 08:02

## 2023-02-27 RX ADMIN — METRONIDAZOLE 500 MG: 500 TABLET, FILM COATED ORAL at 18:33

## 2023-02-27 RX ADMIN — CEPHALEXIN 500 MG: 500 CAPSULE ORAL at 08:02

## 2023-02-27 RX ADMIN — SODIUM CHLORIDE, PRESERVATIVE FREE 10 ML: 5 INJECTION INTRAVENOUS at 09:24

## 2023-02-27 RX ADMIN — ACETAMINOPHEN 650 MG: 325 TABLET ORAL at 21:22

## 2023-02-27 RX ADMIN — ATORVASTATIN CALCIUM 80 MG: 80 TABLET, FILM COATED ORAL at 08:02

## 2023-02-27 RX ADMIN — HEPARIN SODIUM 1300 UNITS: 1000 INJECTION INTRAVENOUS; SUBCUTANEOUS at 13:11

## 2023-02-27 RX ADMIN — TAMSULOSIN HYDROCHLORIDE 0.4 MG: 0.4 CAPSULE ORAL at 08:02

## 2023-02-27 RX ADMIN — EPOETIN ALFA-EPBX 8000 UNITS: 4000 INJECTION, SOLUTION INTRAVENOUS; SUBCUTANEOUS at 13:06

## 2023-02-27 RX ADMIN — HEPARIN SODIUM 1200 UNITS: 1000 INJECTION INTRAVENOUS; SUBCUTANEOUS at 13:10

## 2023-02-27 RX ADMIN — ACETAMINOPHEN 650 MG: 325 TABLET ORAL at 03:25

## 2023-02-27 RX ADMIN — ACETAMINOPHEN 650 MG: 325 TABLET ORAL at 14:14

## 2023-02-27 RX ADMIN — CEPHALEXIN 500 MG: 500 CAPSULE ORAL at 21:22

## 2023-02-27 ASSESSMENT — PAIN DESCRIPTION - DESCRIPTORS
DESCRIPTORS: ACHING
DESCRIPTORS: ACHING

## 2023-02-27 ASSESSMENT — PAIN DESCRIPTION - LOCATION
LOCATION: LEG
LOCATION: KNEE

## 2023-02-27 ASSESSMENT — PAIN - FUNCTIONAL ASSESSMENT: PAIN_FUNCTIONAL_ASSESSMENT: PREVENTS OR INTERFERES SOME ACTIVE ACTIVITIES AND ADLS

## 2023-02-27 ASSESSMENT — PAIN SCALES - GENERAL
PAINLEVEL_OUTOF10: 7
PAINLEVEL_OUTOF10: 7
PAINLEVEL_OUTOF10: 3
PAINLEVEL_OUTOF10: 4
PAINLEVEL_OUTOF10: 0

## 2023-02-27 ASSESSMENT — PAIN DESCRIPTION - ORIENTATION: ORIENTATION: RIGHT

## 2023-02-27 NOTE — CARE COORDINATION
Transitional Planning  Spoke with patient, plan to return home with [address] care. Patient denies need for SNF. May need OP HD. Has Temp cath. OP HD set up at [address] MWF at 515 am.  Patient will need permanent cath prior to discharge.

## 2023-02-27 NOTE — PROGRESS NOTES
Dialysis Post Treatment Note  Patient tolerated treatment well. Denies complaints at time of discharge. Vitals:    02/27/23 1330   BP: (!) 163/81   Pulse: 55   Resp: 20   Temp: 98.2 °F (36.8 °C)   SpO2:      Pre-Weight = 133.9 kg  Post-weight = Weight: 292 lb 1.8 oz (132.5 kg)  Total Liters Processed = Blood Volume Processed (Liters): 66.9 l/min  Rinseback Volume (mL) = Rinseback Volume (ml): 230 ml  Net Removal (mL) = 1400 ml  Length of treatment=3 hours and 15 minutes  Access:  IJ catheter    Treatment terminated 15 minutes early due to clotted venous chamber; all blood returned and post HD access catheter care done aseptically. Had to reduced UF and flushed 100 cc saline on the last 45 minutes due to leg cramps. Patient had frequent PVC and bradycardia while on HD but asymptomatic, otherwise, tolerated treatment fairly.

## 2023-02-27 NOTE — PROGRESS NOTES
Dialysis Time Out  To be done by RN and tech or 2 RNs  Staff Names CwinanicetoRN/PkrissenRRAINER    [x]  Identity of the patient using 2 patient identifiers  [x]  Consent for treatment  [x]  Equipment-proper machine and dialyzer  [x]  B-Hep B status  [x]  Orders- to include bath, blood flow, dialyzer, time and fluid removal  [x]  Access-Correct site and in working order  [x]  Time for patient to ask questions.

## 2023-02-27 NOTE — PROGRESS NOTES
[facility]  Office: [address], DO, Leo Michael, DO, Rip Mathur, DO, William Monaco Blood, DO, Pro Shipman MD, Marylen Dick, MD, Benton Lopez MD, Mart Spence MD,  Vivienne Brower MD, Amaury Mmis MD, Denita Valentine, DO, Mo Coker MD,  Austin Avery MD, Arminda Tariq MD, Luis Mitchell, DO, Britt Logan MD, Lillian Daley MD, Lyubov Jensen, DO, Jay Hancock MD, Efraín Paulino MD, Martha Collins MD, Mina Leija MD, Nery Berg, DO, Hernan Engel MD, Shereen Alfredo MD, Jacque Hilliard, NORBERT,  Jacqui Manuel CNP, Joshua Frye, CNP, Michelle Mckeon, CNP,  Codie Huff, [facility], Dalila Miller, CNP, Nakita Ludwig, CNP, Lev Schneider CNP, Luke Larsen, CNP, Ghanshyam Carranza, CNP, Mulugeta Parra PA-C, Gee Gold, CNS, Holland Doctor, CNP, Billy Altman, [address] [place] [address]    Progress Note    2/27/2023    10:51 AM    Name:   Felisa Sheffield  MRN:     [id number]     Acct:      [de-identified]   Room:   [address] [place] [address] Day:  8  Admit Date:  2/19/2023 12:32 AM    PCP:   Huston Nissen  Code Status:  Full Code    Subjective:     C/C:   Chief Complaint   Patient presents with    Hematuria     Transfer from [facility] d/t hematuria. Pt with history of cystoscopy with bladder bioposy and fulguration, urolift x 4  per Dr. Randall Lewis on 01/31/023. Pt with urinary retention, dribbling and passing blood clots today. Pt with history of atrial fibrillation, currently on Coumadin. INR-3.71 today. Pt reports he has held his coumadin for the past three days. Pt arrives with casey catheter in place with scant amount of bright red blood. Interval History Status:     Doing better  Casey out  No more hematuria    Brief History:     72-year-old male transferred from [facility] secondary to hematuria. He does have a history of recent cystoscopy with bladder biopsy and fulguration, UroLift x4 with Dr. Randall Lewis. This was on 2023. He is on Coumadin for atrial fibrillation with a supratherapeutic INR on admission. Currently is being held his most recent INR is 2.8. His chief complaint on arrival was difficulty in urination and noticing blood in his urine    Past Medical History:   has a past medical history of Atrial fibrillation ([facility] Benign prostatic hyperplasia with urinary frequency, Cancer ([facility] Cardiomyopathy ([facility] CKD (chronic kidney disease), Coronary atherosclerosis, Gout, Morbid obesity with body mass index (BMI) of 45.0 to 49.9 in adult [facility]), Obesity, Pulmonary hypertension ([facility] PVD (peripheral vascular disease) ([facility] Right ventricular dilation, Tricuspid regurgitation, and Type 2 diabetes mellitus with diabetic polyneuropathy ([facility] Social History:   reports that he has never smoked. He has never used smokeless tobacco. He reports that he does not currently use alcohol. He reports that he does not use drugs. Family History:   Family History   Problem Relation Age of Onset    Heart Disease Mother     Breast Cancer Sister     Cancer Brother         prostate       Review of Systems:     12 point ROS negative other than what is noted in brief history  He does have some difficulty with urination and is feeling ill in general  Has also noted blood in his urine    Physical Examination:        Vitals  BP (!) 152/88   Pulse 55   Temp 98.6 °F (37 °C)   Resp 20   Ht 5' 11\" (1.803 m)   Wt 295 lb 3.1 oz (133.9 kg)   SpO2 94%   BMI 41.17 kg/m²   Temp (24hrs), Av.3 °F (36.8 °C), Min:97.6 °F (36.4 °C), Max:98.7 °F (37.1 °C)    Recent Labs     23  1115 23  1610 23  1950 23  0716   POCGLU 144* 165* 136* 134*         Physical Exam  Vitals reviewed. Constitutional:       Appearance: Normal appearance. He is ill-appearing. HENT:      Head: Normocephalic. Eyes:      Extraocular Movements: Extraocular movements intact.       Conjunctiva/sclera: Conjunctivae normal. Cardiovascular:      Rate and Rhythm: Normal rate and regular rhythm. Pulses: Normal pulses. Heart sounds: Normal heart sounds. Pulmonary:      Effort: Pulmonary effort is normal.      Breath sounds: Normal breath sounds. Abdominal:      Comments: Slightly distended abdomen   Neurological:      General: No focal deficit present. Mental Status: He is alert. Psychiatric:         Mood and Affect: Mood normal.         Behavior: Behavior normal.         Thought Content: Thought content normal.         Judgment: Judgment normal.     Alert and oriented had some confusion    Medications: Allergies:  No Known Allergies    Current Meds:   Scheduled Meds:    cephALEXin  500 mg Oral 2 times per day    epoetin pedro-epbx  8,000 Units IntraVENous Once per day on Mon Wed Fri    atorvastatin  80 mg Oral Daily    finasteride  5 mg Oral Daily    vitamin B-12  1,000 mcg Oral Daily    tamsulosin  0.4 mg Oral Daily    sodium chloride flush  5-40 mL IntraVENous 2 times per day    metroNIDAZOLE  500 mg Oral 3 times per day    insulin lispro  0-8 Units SubCUTAneous TID WC    insulin lispro  0-4 Units SubCUTAneous Nightly     Continuous Infusions:    sodium chloride      sodium chloride      sodium chloride      sodium chloride      dextrose       PRN Meds: sodium chloride, sodium chloride, oxyCODONE **OR** oxyCODONE, sodium chloride, albumin human, heparin (porcine), heparin (porcine), midodrine, bisacodyl, sodium chloride flush, sodium chloride, ondansetron **OR** ondansetron, polyethylene glycol, acetaminophen **OR** acetaminophen, morphine, opium-belladonna, dextrose bolus **OR** dextrose bolus, glucagon (rDNA), dextrose, glucose    Data:     I/O (24Hr):     Intake/Output Summary (Last 24 hours) at 2/27/2023 1051  Last data filed at 2/27/2023 0656  Gross per 24 hour   Intake --   Output 275 ml   Net -275 ml         Labs:  Hematology:  Recent Labs     02/25/23  0634 02/26/23  0625 02/27/23  0617   WBC 10.5 13.2* 11.3   RBC 2.80* 2.94* 3.20*   HGB 8.2* 8.6* 9.4*   HCT 26.6* 29.5* 31.6*   MCV 95.0 100.3 98.8   MCH 29.3 29.3 29.4   MCHC 30.8 29.2 29.7   RDW 15.6* 15.4* 15.4*    250 198   MPV 10.2 10.0 9.8       Chemistry:  Recent Labs     02/25/23  0634 02/26/23  0625 02/27/23  0617    138 140   K 4.4 3.9 3.9    104 104   CO2 23 21 23   GLUCOSE 155* 126* 127*   BUN 31* 42* 45*   CREATININE 3.62* 4.22* 3.99*   ANIONGAP 12 13 13   LABGLOM 16* 14* 14*   CALCIUM 9.8 8.0* 8.1*       Recent Labs     02/25/23 2007 02/26/23  0717 02/26/23  1115 02/26/23  1610 02/26/23  1950 02/27/23  0716   POCGLU 183* 117* 144* 165* 136* 134*       ABG:No results found for: POCPH, PHART, PH, POCPCO2, RFO7FUO, PCO2, POCPO2, PO2ART, PO2, POCHCO3, ZZB4QWW, HCO3, NBEA, PBEA, BEART, BE, THGBART, THB, YID9NHD, QPKH2HZR, R8RQFZZA, O2SAT, FIO2  No results found for: SPECIAL  No results found for: CULTURE    Radiology:  No results found.     Assessment:        Primary Problem  Clot retention of urine    Active Hospital Problems    Diagnosis Date Noted    Hx of type 2 diabetes mellitus [Z86.39] 02/22/2023     Priority: Medium    Hx of chronic kidney disease [Z87.448] 02/22/2023     Priority: Medium    Stage 3a chronic kidney disease ([facility] [N18.31] 02/21/2023     Priority: Medium    Supratherapeutic INR [R79.1] 02/20/2023     Priority: Medium    Orthostatic hypotension [I95.1] 02/20/2023     Priority: Medium    Acute blood loss anemia: Due to hematuria/bladder cancer [D62] 02/20/2023     Priority: Medium    Urinary retention [R33.9] 02/20/2023     Priority: Medium    Clot retention of urine [R33.8] 02/19/2023     Priority: Medium    Gross hematuria [R31.0] 02/19/2023     Priority: Medium    On continuous oral anticoagulation [Z79.01] 02/19/2023     Priority: Medium    Colitis [K52.9] 02/19/2023     Priority: Medium    Bladder carcinoma ([facility] [C67.9] 02/19/2023     Priority: Medium    MATTI (acute kidney injury) ([facility] [N17.9] 02/19/2023 Priority: Medium    Hyperkalemia [E87.5] 02/19/2023     Priority: Medium    Atrial fibrillation ([facility] [I48.91] 07/01/2022     Priority: Medium    Type 2 diabetes mellitus with diabetic polyneuropathy ([facility] [E11.42]      Priority: Medium         Plan:        Postoperative day #3 status post cystoscopy with clot evacuation fulguration transurethral resection of bladder tissue and prostate - which has been successful at stopping bleeding  Continue to hold anticoagulation  Further recommendations from urology to follow regarding surgery or not  If no surgery planned patient can be discharged next 24 to 48 hours  Temp dialysis catheter for renal failure - pt has recovered some renal function, anuric, may not need long term dialysis   Worsening cr yesterday, very poor residual kidney function  Bgm adequate watch for hypoglycemia  Hgb this AM much improved, urine looks more clear - holding AC 5 total days  Wait for a1c before deciding on insulin  No more metformin, jardiance will defer to nephro, no actos given bladder history  Permacath placement - pending - needs chair time too    IP CONSULT TO UROLOGY  IP CONSULT TO HOSPITALIST  IP CONSULT TO UROLOGY  IP CONSULT TO NEPHROLOGY  IP CONSULT TO IV TEAM  IP CONSULT TO HOME CARE NEEDS    Rachael Cuellar MD  2/27/2023  10:51 AM

## 2023-02-27 NOTE — PROGRESS NOTES
Nephrology Progress Note      SUBJECTIVE      Patient seen and examined on HD. Tolerating the procedure well. No acute issues overnight  Denies any new symptoms, reports feeling better overall  Mason catheter removed on 2023, denies any difficulty urination or denies any feeling of incomplete bladder emptying  Urine in the container looks clear not bloody anymore  Urine output documented as about 375 cc in the last 24 hours,? Proper documentation.   Has remained afebrile overnight, heart rate in the 50s, blood pressure okay, saturating well on room air  Labs this morning showed sodium 140, potassium 3.9, bicarb 23, BUN 45, creatinine 3.99, gap 13, WBC 1.3, hemoglobin 9.4, platelets 548  Urine output charted 375 cc last 24    OBJECTIVE      CURRENT TEMPERATURE:  Temp: 98.6 °F (37 °C)  MAXIMUM TEMPERATURE OVER 24HRS:  Temp (24hrs), Av.3 °F (36.8 °C), Min:97.6 °F (36.4 °C), Max:98.7 °F (37.1 °C)    CURRENT RESPIRATORY RATE:  Resp: 20  CURRENT PULSE:  Heart Rate: 55  CURRENT BLOOD PRESSURE:  BP: (!) 152/88  24HR BLOOD PRESSURE RANGE:  Systolic (07DOA), ETJ:169 , Min:106 , LNA:515   ; Diastolic (48CQF), MRC:64, Min:47, Max:88    24HR INTAKE/OUTPUT:    Intake/Output Summary (Last 24 hours) at 2023 1037  Last data filed at 2023 8486  Gross per 24 hour   Intake --   Output 275 ml   Net -275 ml       PHYSICAL EXAM      GENERAL APPEARANCE:Awake, alert, in no acute distress  SKIN: warm   EYES: conjunctivae normal  NECK:  JVD: None   PULMONARY: Clear breath sounds on auscultation bilaterally  CADRDIOVASCULAR: Normal heart sounds  ABDOMEN: Soft, nontender  EXTREMITIES: No edema    CURRENT MEDICATIONS      0.9 % sodium chloride infusion, PRN  0.9 % sodium chloride infusion, PRN  oxyCODONE (ROXICODONE) immediate release tablet 5 mg, Q6H PRN   Or  oxyCODONE (ROXICODONE) immediate release tablet 10 mg, Q4H PRN  0.9 % sodium chloride infusion, PRN  albumin human 25 % IV solution 25 g, PRN  cephALEXin (KEFLEX) capsule 500 mg, 2 times per day  epoetin pedro-epbx (RETACRIT) injection 8,000 Units, Once per day on Mon Wed Fri  heparin (porcine) injection 1,200 Units, PRN  heparin (porcine) injection 1,300 Units, PRN  midodrine (PROAMATINE) tablet 2.5 mg, TID PRN  bisacodyl (DULCOLAX) EC tablet 5 mg, Daily PRN  atorvastatin (LIPITOR) tablet 80 mg, Daily  finasteride (PROSCAR) tablet 5 mg, Daily  vitamin B-12 (CYANOCOBALAMIN) tablet 1,000 mcg, Daily  tamsulosin (FLOMAX) capsule 0.4 mg, Daily  sodium chloride flush 0.9 % injection 5-40 mL, 2 times per day  sodium chloride flush 0.9 % injection 10 mL, PRN  0.9 % sodium chloride infusion, PRN  ondansetron (ZOFRAN-ODT) disintegrating tablet 4 mg, Q8H PRN   Or  ondansetron (ZOFRAN) injection 4 mg, Q6H PRN  polyethylene glycol (GLYCOLAX) packet 17 g, Daily PRN  acetaminophen (TYLENOL) tablet 650 mg, Q6H PRN   Or  acetaminophen (TYLENOL) suppository 650 mg, Q6H PRN  morphine (PF) injection 2 mg, Q4H PRN  opium-belladonna (B&O SUPPRETTES) 16.2-60 MG suppository 60 mg, Q8H PRN  dextrose bolus 10% 125 mL, PRN   Or  dextrose bolus 10% 250 mL, PRN  glucagon (rDNA) injection 1 mg, PRN  dextrose 10 % infusion, Continuous PRN  metroNIDAZOLE (FLAGYL) tablet 500 mg, 3 times per day  insulin lispro (HUMALOG) injection vial 0-8 Units, TID WC  insulin lispro (HUMALOG) injection vial 0-4 Units, Nightly  glucose chewable tablet 16 g, PRN          LABS      CBC:   Recent Labs     02/25/23  0634 02/26/23  0625 02/27/23  0617   WBC 10.5 13.2* 11.3   RBC 2.80* 2.94* 3.20*   HGB 8.2* 8.6* 9.4*   HCT 26.6* 29.5* 31.6*   MCV 95.0 100.3 98.8   MCH 29.3 29.3 29.4   MCHC 30.8 29.2 29.7   RDW 15.6* 15.4* 15.4*    250 198   MPV 10.2 10.0 9.8      BMP:   Recent Labs     02/25/23  0634 02/26/23  0625 02/27/23  0617    138 140   K 4.4 3.9 3.9    104 104   CO2 23 21 23   BUN 31* 42* 45*   CREATININE 3.62* 4.22* 3.99*   GLUCOSE 155* 126* 127*   CALCIUM 9.8 8.0* 8.1*        JONATHAN:   Lab Results Component Value Date    JONATHAN EQUIVOCAL (A) 02/20/2023       SPEP:   Lab Results   Component Value Date/Time    PROT 6.2 02/21/2023 05:43 AM    PATH ELECTRONICALLY SIGNED. Chino Dee M.D. 02/20/2023 04:16 PM     HEPBSAG:  Lab Results   Component Value Date/Time    HEPBSAG NONREACTIVE 02/21/2023 11:29 AM     HEPCAB:  Lab Results   Component Value Date/Time    HEPCAB NONREACTIVE 02/21/2023 11:29 AM     C3:   Lab Results   Component Value Date    C3 127 02/20/2023     C4:   Lab Results   Component Value Date    C4 31 02/20/2023     MPO ANCA:   Lab Results   Component Value Date/Time    MPO <0.3 02/20/2023 04:16 PM    .  PR3 ANCA:    Lab Results   Component Value Date/Time    PR3 <0.7 02/20/2023 04:16 PM       RADIOLOGY      Reviewed as available. ASSESSMENT      1 acute kidney injury secondary to obstructive uropathy related to bladder clots and complicated further by hypotension/concomitant diuretic and ACE inhibitor use - initiated dialysis on 2/95/1945.  2 metabolic acidosis - improving with dialysis. 3 chronic kidney disease stage III with baseline creatinine 1.4-1.6. Never seen a nephrologist in the past  4 hospitalized for evaluation of hematuria with background history of bladder tumor status post TURBT in the past, this post cystoscopy x2, clot evacuation, TURP, TURBT  5 type 2 diabetes  6 history of BPH  7 anemia    PLAN      1. Patient was seen and examined on HD at bedside. Orders were confirmed with the HD nurse. 2.  Strict intake and output monitoring  3. Close monitoring of electrolytes and renal functions  4. BMP in the a.m.  5.  Monitor hemoglobin and hemodynamics closely  6. Will need tunneled catheter to be placed in IR today. 7.   working toward setting up outpatient hemodialysis spot at [facility] [place] on MWF schedule. 8.  Will follow.     Vandana Mccord  PGY-2  Internal Medicine  [address] [facility]   10:37 [place] 2/27/2023 Attending Physician Statement  I have discussed the care of this patient, including pertinent history and exam findings, with the Resident/CNP. I have reviewed and edited the key elements of all parts of the encounter with the Resident/CNP. I agree with the assessment, plan and orders as documented by the Resident/CNP. Jose Maria George MD   Nephrology [address] [place] [address] Drive    This note is created with the assistance of a speech-recognition program. While intending to generate a document that actually reflects the content of the visit, no guarantees can be provided that every mistake has been identified and corrected by editing.

## 2023-02-27 NOTE — CARE COORDINATION
SW following for HD needs. Referral to [place] [id number]. Spoke with Lucero Ramos who confirmed patient will be MWF 5:15am. Will need tunneled cath prior to discharge. SW notified HD charge RN.    5705  Provided update to patient. Patient states he will provide time to family to ensure transportation can be secured. Will notify SW if assistance needed.

## 2023-02-27 NOTE — PROGRESS NOTES
Physical Therapy        Physical Therapy Cancel Note      DATE: 2023    NAME: Steven Connolly  MRN: [id number]   : 1940      Patient not seen this date for Physical Therapy due to:    Hemodialysis:      Electronically signed by Blaire Gasca PTA on 2023 at 1:07 PM

## 2023-02-28 ENCOUNTER — APPOINTMENT (OUTPATIENT)
Dept: INTERVENTIONAL RADIOLOGY/VASCULAR | Age: 83
DRG: 665 | End: 2023-02-28
Payer: MEDICARE

## 2023-02-28 VITALS
SYSTOLIC BLOOD PRESSURE: 147 MMHG | OXYGEN SATURATION: 94 % | BODY MASS INDEX: 41.11 KG/M2 | WEIGHT: 293.65 LBS | RESPIRATION RATE: 27 BRPM | TEMPERATURE: 101 F | HEIGHT: 71 IN | HEART RATE: 68 BPM | DIASTOLIC BLOOD PRESSURE: 62 MMHG

## 2023-02-28 LAB
GLUCOSE BLD-MCNC: 131 MG/DL (ref 75–110)
GLUCOSE BLD-MCNC: 154 MG/DL (ref 75–110)
GLUCOSE BLD-MCNC: 163 MG/DL (ref 75–110)
HCT VFR BLD AUTO: 29.5 % (ref 40.7–50.3)
HGB BLD-MCNC: 8.8 G/DL (ref 13–17)
MCH RBC QN AUTO: 29.2 PG (ref 25.2–33.5)
MCHC RBC AUTO-ENTMCNC: 29.8 G/DL (ref 28.4–34.8)
MCV RBC AUTO: 98 FL (ref 82.6–102.9)
NRBC AUTOMATED: 0 PER 100 WBC
PDW BLD-RTO: 15.1 % (ref 11.8–14.4)
PLATELET # BLD AUTO: 167 K/UL (ref 138–453)
PMV BLD AUTO: 10.1 FL (ref 8.1–13.5)
RBC # BLD: 3.01 M/UL (ref 4.21–5.77)
SURGICAL PATHOLOGY REPORT: NORMAL
WBC # BLD AUTO: 10.6 K/UL (ref 3.5–11.3)

## 2023-02-28 PROCEDURE — 99232 SBSQ HOSP IP/OBS MODERATE 35: CPT | Performed by: INTERNAL MEDICINE

## 2023-02-28 PROCEDURE — 6370000000 HC RX 637 (ALT 250 FOR IP): Performed by: STUDENT IN AN ORGANIZED HEALTH CARE EDUCATION/TRAINING PROGRAM

## 2023-02-28 PROCEDURE — 2709999900 HC NON-CHARGEABLE SUPPLY

## 2023-02-28 PROCEDURE — 82947 ASSAY GLUCOSE BLOOD QUANT: CPT

## 2023-02-28 PROCEDURE — 2580000003 HC RX 258: Performed by: STUDENT IN AN ORGANIZED HEALTH CARE EDUCATION/TRAINING PROGRAM

## 2023-02-28 PROCEDURE — 85027 COMPLETE CBC AUTOMATED: CPT

## 2023-02-28 PROCEDURE — 36558 INSERT TUNNELED CV CATH: CPT

## 2023-02-28 PROCEDURE — 36556 INSERT NON-TUNNEL CV CATH: CPT

## 2023-02-28 PROCEDURE — C1750 CATH, HEMODIALYSIS,LONG-TERM: HCPCS

## 2023-02-28 PROCEDURE — 77001 FLUOROGUIDE FOR VEIN DEVICE: CPT

## 2023-02-28 PROCEDURE — 36415 COLL VENOUS BLD VENIPUNCTURE: CPT

## 2023-02-28 PROCEDURE — 99239 HOSP IP/OBS DSCHRG MGMT >30: CPT | Performed by: FAMILY MEDICINE

## 2023-02-28 PROCEDURE — C1769 GUIDE WIRE: HCPCS

## 2023-02-28 PROCEDURE — 6360000002 HC RX W HCPCS: Performed by: PHYSICIAN ASSISTANT

## 2023-02-28 RX ORDER — HEPARIN SODIUM 1000 [USP'U]/ML
INJECTION, SOLUTION INTRAVENOUS; SUBCUTANEOUS
Status: COMPLETED | OUTPATIENT
Start: 2023-02-28 | End: 2023-02-28

## 2023-02-28 RX ORDER — FENTANYL CITRATE 50 UG/ML
INJECTION, SOLUTION INTRAMUSCULAR; INTRAVENOUS
Status: COMPLETED | OUTPATIENT
Start: 2023-02-28 | End: 2023-02-28

## 2023-02-28 RX ADMIN — FENTANYL CITRATE 50 MCG: 50 INJECTION, SOLUTION INTRAMUSCULAR; INTRAVENOUS at 08:52

## 2023-02-28 RX ADMIN — METRONIDAZOLE 500 MG: 500 TABLET, FILM COATED ORAL at 00:03

## 2023-02-28 RX ADMIN — SODIUM CHLORIDE, PRESERVATIVE FREE 10 ML: 5 INJECTION INTRAVENOUS at 12:21

## 2023-02-28 RX ADMIN — FINASTERIDE 5 MG: 5 TABLET, FILM COATED ORAL at 12:21

## 2023-02-28 RX ADMIN — HEPARIN SODIUM 1900 UNITS: 1000 INJECTION, SOLUTION INTRAVENOUS; SUBCUTANEOUS at 09:02

## 2023-02-28 RX ADMIN — TAMSULOSIN HYDROCHLORIDE 0.4 MG: 0.4 CAPSULE ORAL at 12:19

## 2023-02-28 RX ADMIN — ACETAMINOPHEN 650 MG: 325 TABLET ORAL at 12:21

## 2023-02-28 RX ADMIN — Medication 1000 MCG: at 12:20

## 2023-02-28 RX ADMIN — ATORVASTATIN CALCIUM 80 MG: 80 TABLET, FILM COATED ORAL at 12:19

## 2023-02-28 RX ADMIN — OXYCODONE HYDROCHLORIDE 5 MG: 5 TABLET ORAL at 16:15

## 2023-02-28 ASSESSMENT — PAIN SCALES - GENERAL
PAINLEVEL_OUTOF10: 4
PAINLEVEL_OUTOF10: 7

## 2023-02-28 NOTE — CARE COORDINATION
Discharge [address] [facility]  Clinical Case Management Department  Written by: Janel Keith RN    Patient Name: Jewel Kyle  Attending Provider: No att. providers found  Admit Date: 2023 12:32 AM  MRN: [id number]  Account: [de-identified]                     : 1940  Discharge Date: 2023      Disposition: home with home care and OP HD     Janel Keith RN

## 2023-02-28 NOTE — BRIEF OP NOTE
Brief Postoperative Note    Gabe Quiroz  YOB: 1940  [id number]    Pre-operative Diagnosis: Acute Renal Failure      Post-operative Diagnosis: Same    Procedure: Tunneled Dialysis Catheter    Medication Given: fentanyl    Anesthesia: 1%Lidocaine     Surgeons/Assistants: LIBIA Alcazar and Angeline Menezes MD    Estimated Blood Loss: Minimal    Complications: none    14 Fr x 23 cm tip to cuff palindrome tunneled HD Catheter placed successfully via the Site:  Right Internal Jugular Vein. Catheter secured to skin, dressing applied. Catheter locked with Heparin. May use HD cath for dialysis.     Electronically signed by LIBIA Alcazar on 2/28/2023 at 9:09 AM

## 2023-02-28 NOTE — PROGRESS NOTES
Nephrology Progress Note      SUBJECTIVE      Patient was seen and examined and chart reviewed. No acute events overnight. Patient remained afebrile and hemodynamically stable, on room air. Patient is doing well this morning. Denies any complains. Patient received Tunneled dialysis catheter this morning by IR. S/p HD yesterday: 1.4 L fluid removed. Treatment needed 15 minutes early due to clotted venous chamber. Patient also had frequent PVCs and bradycardia while on hemodialysis but asymptomatic. Patient tolerated treatment fairly.  working on dialysis set up after discharge. Referral sent to  [facility]al [facility] [place] and confirmed MWF time.      Uout: 175 mL/24 hours    Labs reviewed: BMP pending; Hgb 8.8<--9.4; Plt 167; WBC 10.6      OBJECTIVE      CURRENT TEMPERATURE:  Temp: (!) 101 °F (38.3 °C)  MAXIMUM TEMPERATURE OVER 24HRS:  Temp (24hrs), Av.9 °F (37.2 °C), Min:97.7 °F (36.5 °C), Max:101 °F (38.3 °C)    CURRENT RESPIRATORY RATE:  Resp: 18  CURRENT PULSE:  Heart Rate: 66  CURRENT BLOOD PRESSURE:  BP: (!) 140/63  24HR BLOOD PRESSURE RANGE:  Systolic (90BQE), SIH:198 , Min:122 , JGL:178   ; Diastolic (63FPN), ZHZ:00, Min:63, Max:88    24HR INTAKE/OUTPUT:    Intake/Output Summary (Last 24 hours) at 2023 0843  Last data filed at 2023 4235  Gross per 24 hour   Intake 400 ml   Output 2175 ml   Net -1775 ml       PHYSICAL EXAM      GENERAL APPEARANCE:Awake, alert, in no acute distress  SKIN: warm and dry, no rash or erythema  EYES: conjunctivae normal and sclera anicteric  ENT: no thrush no pharyngeal congestion   NECK:  JVD: None, tunneled catheter in place right IJ  PULMONARY: clear to auscultation bilaterally- no wheezes, rales or rhonchi, normal air movement, no respiratory distress;   CADRDIOVASCULAR: Normal heart sounds  ABDOMEN: soft, non-tender, non-distended, normal bowel sounds, no masses or organomegaly and soft nontender, bowel sounds present, no organomegaly,  no ascites  EXTREMITIES: no cyanosis, clubbing or edema    CURRENT MEDICATIONS      0.9 % sodium chloride infusion, PRN  0.9 % sodium chloride infusion, PRN  oxyCODONE (ROXICODONE) immediate release tablet 5 mg, Q6H PRN   Or  oxyCODONE (ROXICODONE) immediate release tablet 10 mg, Q4H PRN  0.9 % sodium chloride infusion, PRN  albumin human 25 % IV solution 25 g, PRN  cephALEXin (KEFLEX) capsule 500 mg, 2 times per day  epoetin pedro-epbx (RETACRIT) injection 8,000 Units, Once per day on Mon Wed Fri  heparin (porcine) injection 1,200 Units, PRN  heparin (porcine) injection 1,300 Units, PRN  midodrine (PROAMATINE) tablet 2.5 mg, TID PRN  bisacodyl (DULCOLAX) EC tablet 5 mg, Daily PRN  atorvastatin (LIPITOR) tablet 80 mg, Daily  finasteride (PROSCAR) tablet 5 mg, Daily  vitamin B-12 (CYANOCOBALAMIN) tablet 1,000 mcg, Daily  tamsulosin (FLOMAX) capsule 0.4 mg, Daily  sodium chloride flush 0.9 % injection 5-40 mL, 2 times per day  sodium chloride flush 0.9 % injection 10 mL, PRN  0.9 % sodium chloride infusion, PRN  ondansetron (ZOFRAN-ODT) disintegrating tablet 4 mg, Q8H PRN   Or  ondansetron (ZOFRAN) injection 4 mg, Q6H PRN  polyethylene glycol (GLYCOLAX) packet 17 g, Daily PRN  acetaminophen (TYLENOL) tablet 650 mg, Q6H PRN   Or  acetaminophen (TYLENOL) suppository 650 mg, Q6H PRN  morphine (PF) injection 2 mg, Q4H PRN  opium-belladonna (B&O SUPPRETTES) 16.2-60 MG suppository 60 mg, Q8H PRN  dextrose bolus 10% 125 mL, PRN   Or  dextrose bolus 10% 250 mL, PRN  glucagon (rDNA) injection 1 mg, PRN  dextrose 10 % infusion, Continuous PRN  metroNIDAZOLE (FLAGYL) tablet 500 mg, 3 times per day  insulin lispro (HUMALOG) injection vial 0-8 Units, TID WC  insulin lispro (HUMALOG) injection vial 0-4 Units, Nightly  glucose chewable tablet 16 g, PRN          LABS      CBC:   Recent Labs     02/26/23  0625 02/27/23  0617 02/28/23  0525   WBC 13.2* 11.3 10.6   RBC 2.94* 3.20* 3.01*   HGB 8.6* 9.4* 8.8*   HCT 29.5* 31.6* 29.5*   MCV 100.3 98.8 98.0   MCH 29.3 29.4 29.2   MCHC 29.2 29.7 29.8   RDW 15.4* 15.4* 15.1*    198 167   MPV 10.0 9.8 10.1      BMP:   Recent Labs     02/26/23  0625 02/27/23  0617    140   K 3.9 3.9    104   CO2 21 23   BUN 42* 45*   CREATININE 4.22* 3.99*   GLUCOSE 126* 127*   CALCIUM 8.0* 8.1*      BNP:No results found for: BNP  PHOSPHORUS:  No results for input(s): PHOS in the last 72 hours. MAGNESIUM: No results for input(s): MG in the last 72 hours. ALBUMIN: No results for input(s): LABALBU in the last 72 hours. IRON:  No results found for: IRON  IRON SATURATION:  No results found for: LABIRON  TIBC:  No results found for: TIBC  FERRITIN:  No results found for: FERRITIN  JONATHAN:   Lab Results   Component Value Date    JONATHAN EQUIVOCAL (A) 02/20/2023       SPEP:   Lab Results   Component Value Date/Time    PROT 6.2 02/21/2023 05:43 AM    PATH ELECTRONICALLY SIGNED.  Sky Perdue M.D. 02/20/2023 04:16 PM     UPEP: No results found for: TPU   HEPBSAG:  Lab Results   Component Value Date/Time    HEPBSAG NONREACTIVE 02/21/2023 11:29 AM     HEPCAB:  Lab Results   Component Value Date/Time    HEPCAB NONREACTIVE 02/21/2023 11:29 AM     C3:   Lab Results   Component Value Date    C3 127 02/20/2023     C4:   Lab Results   Component Value Date    C4 31 02/20/2023     MPO ANCA:   Lab Results   Component Value Date/Time    MPO <0.3 02/20/2023 04:16 PM    .  PR3 ANCA:    Lab Results   Component Value Date/Time    PR3 <0.7 02/20/2023 04:16 PM     URINE SODIUM:  No results found for: CONNIE   URINE POTASSIUM:  No results found for: KUR  URINE CHLORIDE:  No results found for: CLU  URINE PH:  No components found for: PO4U  URINE OSMOLARITY:  No results found for: OSMOU  URINE CREATININE:  No results found for: LABCREA  URINE EOSINOPHILS: No components found for: EOSU  URINE PROTEIN:  No results found for: TPU  URINALYSIS:  U/A: No results found for: NITRU, COLORU, PHUR, LABCAST, WBCUA, RBCUA, MUCUS, TRICHOMONAS, YEAST, Destiny Oliver, Rubin Chisholm, UROBILINOGEN, Monika Barnhart, Kameron Moulton  ANTIGBM:No results found for: GBMABIGG      RADIOLOGY      Reviewed as available. ASSESSMENT      1 Acute kidney injury secondary to obstructive uropathy related to bladder clots and complicated further by hypotension/concomitant diuretic and ACE inhibitor use - initiated dialysis on 6/74/4504.  2 metabolic acidosis - improving with dialysis. 3 chronic kidney disease stage III with baseline creatinine 1.4-1.6. Never seen a nephrologist in the past  4 hospitalized for evaluation of hematuria with background history of bladder tumor status post TURBT in the past, this post cystoscopy x2, clot evacuation, TURP, TURBT  5 type 2 diabetes  6 history of BPH  7 anemia    PLAN      1. Patient received tunneled catheter this morning by IR. 2.  Strict intake and output monitoring. 3.  Close monitoring of electrolytes and renal functions  4. Monitor hemoglobin and hemodynamics closely  5.  has confirmed outpatient hemodialysis spot at [address] renal [place] on MWF schedule. 6.  Okay to be discharged from nephro perspective. 7. Will follow. Please do not hesitate to call with questions. Aly Khanna MD, MPH, PGY-2  Internal Medicine Resident  [facility],   [place], [place]    Electronically signed by Aly Khanna MD on 2/28/2023 at 8:43 AM     Patient seen with resident. Known history of chronic kidney disease stage IIIb baseline creatinine 1.4-1.5, type 2 diabetes, hypertension, BPH. He was diagnosed to have bladder mass. Underwent fulguration and cystoscopic removal end of January 2023. He did well for about a few weeks then he started noticing onset of bleeding which was getting worse. He was brought into the ER for evaluation. Was flow found to have clot retention. Creatinine was found to be 4.5.   He was taken to the OR underwent clot evacuation and cystoscopy. Bleeding has stopped however renal function not recover now placed on dialysis. Clinical exam unremarkable, tunneled catheter placed today  Plan  1. Stable for discharge. Has an outpatient dialysis spot set up Monday Wednesday Friday. Will not be able to make it tomorrow however cannot make it on Friday. I have consented to that. We will be missing 1 day of dialysis in between. He still has residual renal function tomorrow what appears to be showing signs of renal recovery as well. We will continue on a Monday Wednesday Friday schedule subsequently  2. Same plan discussed with patient and social work  3. Stable for discharge from nephrology standpoint  4. We will follow if he stays  Attending Physician Statement  I have discussed the care of Rafael Norman, including pertinent history and exam findings with the resident/fellow. I have reviewed the key elements of all parts of the encounter with the resident/fellow. I have seen and examined the patient with the resident/fellow. I agree with the assessment and plan and status of the problem list as documented.       .  Electronically signed by Josafat Henry MD on 2/28/2023 at 3:12 PM

## 2023-02-28 NOTE — PLAN OF CARE
Problem: Discharge Planning  Goal: Discharge to home or other facility with appropriate resources  2/28/2023 1617 by Solomon Godinez RN  Outcome: Completed  2/28/2023 5756 by Fredis Allison RN  Outcome: Progressing     Problem: Skin/Tissue Integrity  Goal: Absence of new skin breakdown  Description: 1. Monitor for areas of redness and/or skin breakdown  2. Assess vascular access sites hourly  3. Every 4-6 hours minimum:  Change oxygen saturation probe site  4. Every 4-6 hours:  If on nasal continuous positive airway pressure, respiratory therapy assess nares and determine need for appliance change or resting period.   2/28/2023 1617 by Solomon Godinez RN  Outcome: Completed  2/28/2023 6020 by Fredis Allison RN  Outcome: Progressing     Problem: Safety - Adult  Goal: Free from fall injury  2/28/2023 1617 by Solomon Godinez RN  Outcome: Completed  2/28/2023 5826 by Fredis Allison RN  Outcome: Progressing     Problem: Pain  Goal: Verbalizes/displays adequate comfort level or baseline comfort level  2/28/2023 1617 by Solomon Godinez RN  Outcome: Completed  2/28/2023 0642 by Fredis Allison RN  Outcome: Progressing     Problem: Chronic Conditions and Co-morbidities  Goal: Patient's chronic conditions and co-morbidity symptoms are monitored and maintained or improved  2/28/2023 1617 by Solomon Godinez RN  Outcome: Completed  2/28/2023 0642 by Fredis Allison RN  Outcome: Progressing     Problem: Nutrition Deficit:  Goal: Optimize nutritional status  2/28/2023 1617 by Solomon Godinez RN  Outcome: Completed  2/28/2023 0642 by Fredis Allison RN  Outcome: Progressing     Problem: ABCDS Injury Assessment  Goal: Absence of physical injury  2/28/2023 1617 by Solomon Godinez RN  Outcome: Completed  2/28/2023 0642 by Fredis Allison RN  Outcome: Progressing

## 2023-02-28 NOTE — CARE COORDINATION
Pt will have OP dialysis at  [facility] [place], beginning tomorrow. Pt's chair time is MWF @ 5:15a. Called [facility] [place] and spoke with Milton Orozco - advised pt will start tomorrow - she confirmed they have him on the schedule. Faxed tunneled cath report. Pt updated - he prefers a different chair time. Offered to call to see if anything else open and he stated he prefers to have his dtr call. Discussed if nothing else open, to let the staff know he prefers a new chair time when something becomes available    Dialysis arrangements have been added to AVS/OLY    1515 - Addendum  Family here and stated they cannot provide transportation tomorrow am to dialysis. Called [facility] [place] to see if any other times available tomorrow and was advised there were not. [facility] plans to change pt to TTS, starting next week. Dtr can provide transportation on Fri and then pt will start on TTS schedule. Pt will have transportation arrangements then. Dtr has also been in contact with [facility].  Dr Cynthia Gonsalves aware of plans and agreeable.

## 2023-02-28 NOTE — CARE COORDINATION
Transitional Planning  Patient with dc order, OP HD set up per CHANO and confirmed per Unk Claude from [facility] notified of discharge today and will have office pull OLY once completed. Has transportation.

## 2023-02-28 NOTE — PROGRESS NOTES
Physical Therapy        Physical Therapy Cancel Note      DATE: 2023    NAME: Amrit Alcaraz  MRN: [id number]   : 1940      Patient not seen this date for Physical Therapy due to:    Patient Declined: Pt reports he just got back to bed, unwilling to attempt mobility at this time. Will check back this afternoon as time permits.        Electronically signed by Emery Trivedi PT on 2023 at 11:20 AM

## 2023-02-28 NOTE — DISCHARGE SUMMARY
[facility]  Office: [address] [facility], DO, Puma Bryson, DO, [facility], DO, [facility], DO, Pee Zamora MD, Sammie Jewell MD, Isidra Brooks MD, Charles Raygoza MD,  Shelby Wade MD, Disha Rodriguez MD, Maddie Garcia, DO, Moshe Iyer MD,  Isabella Stiles, DO, Roxanne Aleman MD, Brooks Jones MD, Sharon Severino DO, Milka Walker MD, Trisha Whelan MD, Hiwot Benavides, DO, Homero De Souza MD, Lourena Goltz, MD, Jose Carlos Reich MD, Louisa Larson MD, Darius Chance, DO, Elif Singleton MD, Aydee Miller MD, Keila Melchor [facility],  Alicia Sagastume CNP, Puma Kebede, CNP, Ravi Aguilar, CNP,  Peter Mccormack, [facility], Darlene Horn, CNP, Jenna Mehta CNP, Hanna Reed, CNP, Geovanny Estrada, [facility], [facility] Matthias, CNP, Vince Lombardo PA-C, Dayna Chester, BETINA, Ronnie Malone CNP, Elissa Alvarez, [facility]    Discharge Summary     Patient ID: Sheela Goss  :  1940   MRN: [id number]     ACCOUNT:  [de-identified]   Patient's PCP: Bonny Rivas  Admit Date: 2023   Discharge Date: 2023     Length of Stay: 9  Code Status:  Full Code  Admitting Physician: Rylie Patiño DO  Discharge Physician: Charles Raygoza MD     Active Discharge Diagnoses:     Hospital Problem Lists:  Principal Problem:    Clot retention of urine  Active Problems:    Atrial fibrillation [facility])    Type 2 diabetes mellitus with diabetic polyneuropathy (HCC)    Gross hematuria    On continuous oral anticoagulation    Colitis    Bladder carcinoma (HCC)    MATTI (acute kidney injury) ([facility]    Hyperkalemia    Supratherapeutic INR    Orthostatic hypotension    Acute blood loss anemia: Due to hematuria/bladder cancer    Urinary retention    Stage 3a chronic kidney disease ([facility]    Hx of type 2 diabetes mellitus    Hx of chronic kidney disease    Dependence on renal dialysis ([facility]    Metabolic acidosis    Bladder mass  Resolved Problems:    * No resolved hospital problems. *      Admission Condition:  poor     Discharged Condition: fair    Hospital Stay:       HPI:    82-year-old male transferred from [facility] FOR JOINT DISEASES secondary to hematuria. He does have a history of recent cystoscopy with bladder biopsy and fulguration, UroLift x4 with Dr. Marylu Singh. This was on January 31, 2023. He is on Coumadin for atrial fibrillation with a supratherapeutic INR on admission. Currently is being held his most recent INR is 2.8.    His chief complaint on arrival was difficulty in urination and noticing blood in his urine    Significant therapeutic interventions: per my colleagues documentation    Status post cystoscopy with clot evacuation fulguration transurethral resection of bladder tissue and prostate - which has been successful at stopping bleeding  Continue to hold anticoagulation  Further recommendations from urology to follow regarding surgery or not  Temp dialysis catheter for renal failure - pt has recovered some renal function, anuric, may not need long term dialysis   Worsening cr yesterday, very poor residual kidney function  Bgm adequate watch for hypoglycemia  Hgb this AM much improved, urine looks more clear - holding AC 5 total days  Wait for a1c before deciding on insulin  No more metformin, jardiance will defer to nephro, no actos given bladder history  Permacath placement 2/28    I did see the patient on last day of admission after his PermCath placement, he was already cleared the day prior by all services and all orders medications were already added and reconciled by my colleague    Significant Diagnostic Studies:   Labs / Micro:  CBC:   Lab Results   Component Value Date/Time    WBC 10.6 02/28/2023 05:25 AM    RBC 3.01 02/28/2023 05:25 AM    HGB 8.8 02/28/2023 05:25 AM    HCT 29.5 02/28/2023 05:25 AM    MCV 98.0 02/28/2023 05:25 AM    MCH 29.2 02/28/2023 05:25 AM    MCHC 29.8 02/28/2023 05:25 AM    RDW 15.1 02/28/2023 05:25 AM  02/28/2023 05:25 AM     BMP:    Lab Results   Component Value Date/Time    GLUCOSE 127 02/27/2023 06:17 AM     02/27/2023 06:17 AM    K 3.9 02/27/2023 06:17 AM     02/27/2023 06:17 AM    CO2 23 02/27/2023 06:17 AM    ANIONGAP 13 02/27/2023 06:17 AM    BUN 45 02/27/2023 06:17 AM    CREATININE 3.99 02/27/2023 06:17 AM    CALCIUM 8.1 02/27/2023 06:17 AM    LABGLOM 14 02/27/2023 06:17 AM    GFRAA 55 09/24/2022 04:18 AM    GFR      09/24/2022 04:18 AM     HFP:    Lab Results   Component Value Date/Time    PROT 6.2 02/21/2023 05:43 AM     CMP:    Lab Results   Component Value Date/Time    GLUCOSE 127 02/27/2023 06:17 AM     02/27/2023 06:17 AM    K 3.9 02/27/2023 06:17 AM     02/27/2023 06:17 AM    CO2 23 02/27/2023 06:17 AM    BUN 45 02/27/2023 06:17 AM    CREATININE 3.99 02/27/2023 06:17 AM    ANIONGAP 13 02/27/2023 06:17 AM    ALKPHOS 113 02/21/2023 05:43 AM    ALT 7 02/21/2023 05:43 AM    AST 14 02/21/2023 05:43 AM    BILITOT 0.4 02/21/2023 05:43 AM    LABALBU 2.9 02/21/2023 05:43 AM    ALBUMIN 0.9 02/21/2023 05:43 AM    LABGLOM 14 02/27/2023 06:17 AM    GFRAA 55 09/24/2022 04:18 AM    GFR      09/24/2022 04:18 AM    PROT 6.2 02/21/2023 05:43 AM    CALCIUM 8.1 02/27/2023 06:17 AM     PT/INR:    Lab Results   Component Value Date/Time    PROTIME 15.3 02/23/2023 05:00 AM    INR 1.5 02/23/2023 05:00 AM     PTT:   Lab Results   Component Value Date/Time    APTT 37.2 02/21/2023 11:29 AM     FLP:  No results found for: CHOL, TRIG, HDL  U/A:  No results found for: Evern Primas, SPECGRAV, HGBUR, PHUR, PROTEINU, GLUCOSEU, KETUA, BILIRUBINUR, UROBILINOGEN, NITRU, LEUKOCYTESUR  TSH:  No results found for: TSH     Radiology:  XR CHEST PORTABLE    Result Date: 2/23/2023  Mild cardiomegaly with mild pulmonary vascular congestion. No evidence of overt pulmonary edema.      IR TUNNELED CVC PLACE WO SQ PORT/PUMP > 5 YEARS    Result Date: 2/28/2023  Successful fluoroscopy guided tunneled catheter placement . Okay to use.        Consultations:    Consults:     Final Specialist Recommendations/Findings:   IP CONSULT TO UROLOGY  IP CONSULT TO HOSPITALIST  IP CONSULT TO UROLOGY  IP CONSULT TO NEPHROLOGY  IP CONSULT TO IV TEAM  IP CONSULT TO HOME CARE NEEDS  IP CONSULT TO HOME CARE NEEDS      The patient was seen and examined on day of discharge    A&O X 3  Tunneled cath noted right upper chest wall  CTAB  NSR, NO MRG  Soft abdomen , +BS  No swelling  and pulse palpable    Discharge plan:     Disposition: Home with home health    Physician Follow Up:     Hilario Mccormick [address] [place], [address]  [phone]  Follow up on 3/1/2023  Monday Wednesday Friday at 5:15am.       Requiring Further Evaluation/Follow Up POST HOSPITALIZATION/Incidental Findings:     Diet: renal diet    Activity: As tolerated    Instructions to Patient:     Discharge Medications:      Medication List        CONTINUE taking these medications      amLODIPine 5 MG tablet  Commonly known as: NORVASC     atorvastatin 80 MG tablet  Commonly known as: LIPITOR     finasteride 5 MG tablet  Commonly known as: PROSCAR     furosemide 40 MG tablet  Commonly known as: LASIX     lisinopril 20 MG tablet  Commonly known as: PRINIVIL;ZESTRIL     warfarin 5 MG tablet  Commonly known as: COUMADIN            STOP taking these medications      bisacodyl 5 MG EC tablet  Commonly known as: DULCOLAX     cephALEXin 500 MG capsule  Commonly known as: KEFLEX     glimepiride 2 MG tablet  Commonly known as: AMARYL     hydrALAZINE 50 MG tablet  Commonly known as: APRESOLINE     metFORMIN 1000 MG tablet  Commonly known as: GLUCOPHAGE     metroNIDAZOLE 500 MG tablet  Commonly known as: FLAGYL     potassium chloride 10 MEQ extended release tablet  Commonly known as: KLOR-CON M     tamsulosin 0.4 MG capsule  Commonly known as: FLOMAX     vitamin B-12 1000 MCG tablet  Commonly known as: CYANOCOBALAMIN              No discharge procedures on file. Time Spent on discharge is  20 mins in patient examination, evaluation, counseling as well as medication reconciliation, prescriptions for required medications, discharge plan and follow up. Electronically signed by   Debbie David MD  2/28/2023  3:40 PM      Thank you Dr. Linda Hutchison for the opportunity to be involved in this patient's care.

## 2023-03-09 ENCOUNTER — TELEPHONE (OUTPATIENT)
Dept: NEPHROLOGY | Age: 83
End: 2023-03-09

## 2023-03-09 NOTE — TELEPHONE ENCOUNTER
Patient's daughter, Hernan Montgomery, called stating her father was discharged from   [facility].    She states patient is to see Dr Samantha Hawley in the office post hosp to discuss removing his dialysis catheter. Patient saw Dr Samantha Hawley in hospital 2/20/2023   And other partners in this group through 2/28/2023. Please advise where to schedule.  Next available opening is 5/16/2023 (11 weeks)    Call daughter at [phone] after you speak with Dr Samantha Hawley next week

## 2023-03-16 ENCOUNTER — HOSPITAL ENCOUNTER (OUTPATIENT)
Age: 83
Discharge: HOME | End: 2023-03-16
Payer: MEDICARE

## 2023-03-16 ENCOUNTER — HOSPITAL ENCOUNTER (OUTPATIENT)
Dept: GENERAL RADIOLOGY | Age: 83
Discharge: HOME | End: 2023-03-18
Payer: MEDICARE

## 2023-03-16 DIAGNOSIS — N17.9 ACUTE RENAL FAILURE, UNSPECIFIED ACUTE RENAL FAILURE TYPE (HCC): ICD-10-CM

## 2023-03-16 LAB
INR PPP: 1.4
PARTIAL THROMBOPLASTIN TIME: 28.4 SEC (ref 23.9–33.8)
PROTHROMBIN TIME: 16.8 SEC (ref 11.5–14.2)

## 2023-03-16 PROCEDURE — 36415 COLL VENOUS BLD VENIPUNCTURE: CPT

## 2023-03-16 PROCEDURE — 85610 PROTHROMBIN TIME: CPT

## 2023-03-16 PROCEDURE — 2500000003 HC RX 250 WO HCPCS

## 2023-03-16 PROCEDURE — 85730 THROMBOPLASTIN TIME PARTIAL: CPT

## 2023-03-16 PROCEDURE — 36589 REMOVAL TUNNELED CV CATH: CPT

## 2023-03-16 NOTE — BRIEF OP NOTE
Brief Postoperative Note    Nba Jurado  YOB: 1940  [id number]    Pre-operative Diagnosis: Rt IJ permcath for dialysis; no longer needed    Post-operative Diagnosis: Same    Procedure: Removal    Medications Given: none    Anesthesia: Local    Surgeons/Assistants: Miles Louie MD    Estimated Blood Loss: minimal    Complications: none    Specimens: were not obtained    Findings: Rt IJ permcath removed uneventfully; hemostasis achieved. Bandage placed. Pt tolerated well.       Electronically signed by Miles Louie MD on 3/16/2023 at 12:26 PM

## 2023-03-21 NOTE — TELEPHONE ENCOUNTER
Called daughter and informed her Dr. Fatoumata Meyer wants to follow up with him in a coulple months after dialysis catheter removal appt made on 6/28/23 for follow up.

## 2023-06-05 ENCOUNTER — OFFICE VISIT (OUTPATIENT)
Dept: CARDIOLOGY CLINIC | Age: 83
End: 2023-06-05
Payer: MEDICARE

## 2023-06-05 VITALS
HEIGHT: 71 IN | HEART RATE: 50 BPM | DIASTOLIC BLOOD PRESSURE: 75 MMHG | BODY MASS INDEX: 39.48 KG/M2 | SYSTOLIC BLOOD PRESSURE: 160 MMHG | WEIGHT: 282 LBS

## 2023-06-05 DIAGNOSIS — I48.91 ATRIAL FIBRILLATION, UNSPECIFIED TYPE (HCC): Primary | ICD-10-CM

## 2023-06-05 PROCEDURE — 1036F TOBACCO NON-USER: CPT | Performed by: INTERNAL MEDICINE

## 2023-06-05 PROCEDURE — G8417 CALC BMI ABV UP PARAM F/U: HCPCS | Performed by: INTERNAL MEDICINE

## 2023-06-05 PROCEDURE — 99213 OFFICE O/P EST LOW 20 MIN: CPT | Performed by: INTERNAL MEDICINE

## 2023-06-05 PROCEDURE — G8427 DOCREV CUR MEDS BY ELIG CLIN: HCPCS | Performed by: INTERNAL MEDICINE

## 2023-06-05 PROCEDURE — 1123F ACP DISCUSS/DSCN MKR DOCD: CPT | Performed by: INTERNAL MEDICINE

## 2023-06-05 NOTE — PROGRESS NOTES
Pt here for 6 mo check up     Pt continues with swelling in ankles    Pt has had several family members pass , wife
MCH 29.2 02/28/2023 05:25 AM    MCHC 29.8 02/28/2023 05:25 AM    RDW 15.1 02/28/2023 05:25 AM     02/28/2023 05:25 AM    MPV 10.1 02/28/2023 05:25 AM       Lab Results   Component Value Date/Time     02/27/2023 06:17 AM    K 3.9 02/27/2023 06:17 AM     02/27/2023 06:17 AM    CO2 23 02/27/2023 06:17 AM    BUN 45 02/27/2023 06:17 AM    LABALBU 2.9 02/21/2023 05:43 AM    CREATININE 3.99 02/27/2023 06:17 AM    CALCIUM 8.1 02/27/2023 06:17 AM    GFRAA 55 09/24/2022 04:18 AM    LABGLOM 14 02/27/2023 06:17 AM    GLUCOSE 127 02/27/2023 06:17 AM       Lab Results   Component Value Date/Time    ALKPHOS 113 02/21/2023 05:43 AM    ALT 7 02/21/2023 05:43 AM    AST 14 02/21/2023 05:43 AM    PROT 6.2 02/21/2023 05:43 AM    BILITOT 0.4 02/21/2023 05:43 AM    LABALBU 2.9 02/21/2023 05:43 AM       No results found for: MG    Lab Results   Component Value Date    INR 1.4 03/16/2023    INR 1.5 02/23/2023    INR 1.9 02/22/2023    PROTIME 16.8 (H) 03/16/2023    PROTIME 15.3 (H) 02/23/2023    PROTIME 19.7 (H) 02/22/2023         Lab Results   Component Value Date/Time    LABA1C 5.8 02/27/2023 06:17 AM       No results found for: TRIG, HDL, LDLCALC, LDLDIRECT, LABVLDL    No results found for: TSH      Testing Reviewed:      I haveindividually reviewed the below cardiac tests    EKG:    ECHO: No results found for this or any previous visit. STRESS:    CATH:    Assessment/Plan       Diagnosis Orders   1. Atrial fibrillation, unspecified type (HCC)              Afib on coumadin  Sinus bradycardia, asymptomatic  Hx bladder mass resection  DM  Bladder cancer    Reviwed EKG  Reviewed stres test from [address]  At present patient denies any anginal or heart failrue sympmots  Walks with a walker  Had poor exercise tolerance  The patient is asked to make an attempt to improve diet and exercise patterns to aid in medical management of this problem.   Advised more plant based nutrition/meditarrean diet   Advised patient to

## 2023-06-22 ENCOUNTER — TELEPHONE (OUTPATIENT)
Dept: CARDIOLOGY CLINIC | Age: 83
End: 2023-06-22

## 2023-06-22 NOTE — TELEPHONE ENCOUNTER
----- Message from Fletcher David. [place] Panchito sent at 6/21/2023  5:27 PM EDT -----  Regarding: FEET/ANKLES SWELLING  Contact: [phone]  My feet and ankles are swelling/retaining fluid (a lot). It goes down when I lay down and put them up, but comes back as soon as I get up. My family physician, recommended that I contact your office regarding this problem.

## 2023-08-01 ENCOUNTER — TELEPHONE (OUTPATIENT)
Dept: CARDIOLOGY CLINIC | Age: 83
End: 2023-08-01

## 2023-08-01 NOTE — TELEPHONE ENCOUNTER
Pt last seen by Dr. Haley Patches 6-5-23. Pt is needing clearance for Cystoscopy, Bladder Biopsy and possible TURBT with . Pt is on Coumadin.     Fax [phone]

## 2023-08-04 NOTE — TELEPHONE ENCOUNTER
Pt daughter asking about clearance again, she also states chest xray shows possible chf as pt feet are swelling bad?

## 2023-08-07 RX ORDER — FERROUS SULFATE 325(65) MG
325 TABLET ORAL
COMMUNITY

## 2023-08-08 NOTE — TELEPHONE ENCOUNTER
Spoke with Medical Records at [facility].  Phone: [phone].     They will fax over most recent stress test.

## 2023-08-08 NOTE — TELEPHONE ENCOUNTER
Patient stated that he underwent stress test recently at [facility].    Unable to find it. Please try to get it.  Thanks

## 2023-08-09 NOTE — TELEPHONE ENCOUNTER
Patient's daughter Kellen Krishna returned call. Kellen Krishna states patient isn't having any symptoms. Form out for Dr. Jessica Ferreira to sign.

## 2023-08-09 NOTE — TELEPHONE ENCOUNTER
Please ask patient if he is getting more short of breath compared to when he was seen in clinic.    If no changes, he may proceed with scheduled procedure at moderate risk

## 2023-08-15 ENCOUNTER — ANESTHESIA EVENT (OUTPATIENT)
Dept: OPERATING ROOM | Age: 83
End: 2023-08-15
Payer: MEDICARE

## 2023-08-16 ENCOUNTER — HOSPITAL ENCOUNTER (OUTPATIENT)
Age: 83
Setting detail: OUTPATIENT SURGERY
Discharge: HOME | End: 2023-08-16
Attending: UROLOGY | Admitting: UROLOGY
Payer: MEDICARE

## 2023-08-16 ENCOUNTER — ANESTHESIA (OUTPATIENT)
Dept: OPERATING ROOM | Age: 83
End: 2023-08-16
Payer: MEDICARE

## 2023-08-16 VITALS
DIASTOLIC BLOOD PRESSURE: 69 MMHG | BODY MASS INDEX: 37.15 KG/M2 | HEIGHT: 71 IN | RESPIRATION RATE: 14 BRPM | TEMPERATURE: 97.2 F | WEIGHT: 265.38 LBS | OXYGEN SATURATION: 95 % | SYSTOLIC BLOOD PRESSURE: 150 MMHG | HEART RATE: 47 BPM

## 2023-08-16 DIAGNOSIS — Z85.51 HISTORY OF BLADDER CANCER: ICD-10-CM

## 2023-08-16 LAB
GLUCOSE BLD-MCNC: 149 MG/DL (ref 75–110)
INR PPP: 1.2
PROTHROMBIN TIME: 12.7 SEC (ref 9.4–12.6)

## 2023-08-16 PROCEDURE — 3700000001 HC ADD 15 MINUTES (ANESTHESIA): Performed by: UROLOGY

## 2023-08-16 PROCEDURE — 2709999900 HC NON-CHARGEABLE SUPPLY: Performed by: UROLOGY

## 2023-08-16 PROCEDURE — 88342 IMHCHEM/IMCYTCHM 1ST ANTB: CPT

## 2023-08-16 PROCEDURE — 82947 ASSAY GLUCOSE BLOOD QUANT: CPT

## 2023-08-16 PROCEDURE — 2580000003 HC RX 258: Performed by: UROLOGY

## 2023-08-16 PROCEDURE — 2500000003 HC RX 250 WO HCPCS: Performed by: SPECIALIST

## 2023-08-16 PROCEDURE — 7100000010 HC PHASE II RECOVERY - FIRST 15 MIN: Performed by: UROLOGY

## 2023-08-16 PROCEDURE — 3600000014 HC SURGERY LEVEL 4 ADDTL 15MIN: Performed by: UROLOGY

## 2023-08-16 PROCEDURE — 6360000002 HC RX W HCPCS: Performed by: SPECIALIST

## 2023-08-16 PROCEDURE — 7100000000 HC PACU RECOVERY - FIRST 15 MIN: Performed by: UROLOGY

## 2023-08-16 PROCEDURE — 2580000003 HC RX 258: Performed by: ANESTHESIOLOGY

## 2023-08-16 PROCEDURE — 7100000001 HC PACU RECOVERY - ADDTL 15 MIN: Performed by: UROLOGY

## 2023-08-16 PROCEDURE — 3700000000 HC ANESTHESIA ATTENDED CARE: Performed by: UROLOGY

## 2023-08-16 PROCEDURE — 6360000002 HC RX W HCPCS: Performed by: UROLOGY

## 2023-08-16 PROCEDURE — 88305 TISSUE EXAM BY PATHOLOGIST: CPT

## 2023-08-16 PROCEDURE — 36415 COLL VENOUS BLD VENIPUNCTURE: CPT

## 2023-08-16 PROCEDURE — C9399 UNCLASSIFIED DRUGS OR BIOLOG: HCPCS | Performed by: SPECIALIST

## 2023-08-16 PROCEDURE — 3600000004 HC SURGERY LEVEL 4 BASE: Performed by: UROLOGY

## 2023-08-16 PROCEDURE — 85610 PROTHROMBIN TIME: CPT

## 2023-08-16 RX ORDER — PROPOFOL 10 MG/ML
INJECTION, EMULSION INTRAVENOUS PRN
Status: DISCONTINUED | OUTPATIENT
Start: 2023-08-16 | End: 2023-08-16 | Stop reason: SDUPTHER

## 2023-08-16 RX ORDER — SODIUM CHLORIDE, SODIUM LACTATE, POTASSIUM CHLORIDE, CALCIUM CHLORIDE 600; 310; 30; 20 MG/100ML; MG/100ML; MG/100ML; MG/100ML
INJECTION, SOLUTION INTRAVENOUS CONTINUOUS
Status: DISCONTINUED | OUTPATIENT
Start: 2023-08-16 | End: 2023-08-16 | Stop reason: HOSPADM

## 2023-08-16 RX ORDER — DOXYCYCLINE HYCLATE 100 MG
100 TABLET ORAL 2 TIMES DAILY
Qty: 6 TABLET | Refills: 0 | Status: SHIPPED | OUTPATIENT
Start: 2023-08-16 | End: 2023-08-19

## 2023-08-16 RX ORDER — LIDOCAINE HYDROCHLORIDE 10 MG/ML
1 INJECTION, SOLUTION INFILTRATION; PERINEURAL
Status: DISCONTINUED | OUTPATIENT
Start: 2023-08-16 | End: 2023-08-16 | Stop reason: HOSPADM

## 2023-08-16 RX ORDER — ONDANSETRON 2 MG/ML
4 INJECTION INTRAMUSCULAR; INTRAVENOUS
Status: DISCONTINUED | OUTPATIENT
Start: 2023-08-16 | End: 2023-08-16 | Stop reason: HOSPADM

## 2023-08-16 RX ORDER — SODIUM CHLORIDE 9 MG/ML
INJECTION, SOLUTION INTRAVENOUS PRN
Status: DISCONTINUED | OUTPATIENT
Start: 2023-08-16 | End: 2023-08-16 | Stop reason: HOSPADM

## 2023-08-16 RX ORDER — GLYCOPYRROLATE 0.2 MG/ML
INJECTION INTRAMUSCULAR; INTRAVENOUS PRN
Status: DISCONTINUED | OUTPATIENT
Start: 2023-08-16 | End: 2023-08-16 | Stop reason: SDUPTHER

## 2023-08-16 RX ORDER — METOCLOPRAMIDE HYDROCHLORIDE 5 MG/ML
10 INJECTION INTRAMUSCULAR; INTRAVENOUS
Status: DISCONTINUED | OUTPATIENT
Start: 2023-08-16 | End: 2023-08-16 | Stop reason: HOSPADM

## 2023-08-16 RX ORDER — FENTANYL CITRATE 50 UG/ML
INJECTION, SOLUTION INTRAMUSCULAR; INTRAVENOUS PRN
Status: DISCONTINUED | OUTPATIENT
Start: 2023-08-16 | End: 2023-08-16 | Stop reason: SDUPTHER

## 2023-08-16 RX ORDER — OXYCODONE HYDROCHLORIDE 5 MG/1
5 TABLET ORAL PRN
Status: DISCONTINUED | OUTPATIENT
Start: 2023-08-16 | End: 2023-08-16 | Stop reason: HOSPADM

## 2023-08-16 RX ORDER — SODIUM CHLORIDE 0.9 % (FLUSH) 0.9 %
5-40 SYRINGE (ML) INJECTION PRN
Status: DISCONTINUED | OUTPATIENT
Start: 2023-08-16 | End: 2023-08-16 | Stop reason: HOSPADM

## 2023-08-16 RX ORDER — PHENAZOPYRIDINE HYDROCHLORIDE 100 MG/1
100 TABLET, FILM COATED ORAL 3 TIMES DAILY PRN
Qty: 21 TABLET | Refills: 0 | Status: SHIPPED | OUTPATIENT
Start: 2023-08-16 | End: 2023-08-23

## 2023-08-16 RX ORDER — LIDOCAINE HYDROCHLORIDE 10 MG/ML
INJECTION, SOLUTION INFILTRATION; PERINEURAL PRN
Status: DISCONTINUED | OUTPATIENT
Start: 2023-08-16 | End: 2023-08-16 | Stop reason: SDUPTHER

## 2023-08-16 RX ORDER — SODIUM CHLORIDE 0.9 % (FLUSH) 0.9 %
5-40 SYRINGE (ML) INJECTION EVERY 12 HOURS SCHEDULED
Status: DISCONTINUED | OUTPATIENT
Start: 2023-08-16 | End: 2023-08-16 | Stop reason: HOSPADM

## 2023-08-16 RX ORDER — HYDRALAZINE HYDROCHLORIDE 20 MG/ML
10 INJECTION INTRAMUSCULAR; INTRAVENOUS
Status: DISCONTINUED | OUTPATIENT
Start: 2023-08-16 | End: 2023-08-16 | Stop reason: HOSPADM

## 2023-08-16 RX ORDER — SEVOFLURANE 250 ML/250ML
LIQUID RESPIRATORY (INHALATION)
Status: DISCONTINUED
Start: 2023-08-16 | End: 2023-08-16 | Stop reason: HOSPADM

## 2023-08-16 RX ORDER — DEXAMETHASONE SODIUM PHOSPHATE 10 MG/ML
INJECTION, SOLUTION INTRAMUSCULAR; INTRAVENOUS PRN
Status: DISCONTINUED | OUTPATIENT
Start: 2023-08-16 | End: 2023-08-16 | Stop reason: SDUPTHER

## 2023-08-16 RX ORDER — BUMETANIDE 2 MG/1
TABLET ORAL
COMMUNITY
Start: 2023-07-26

## 2023-08-16 RX ORDER — OXYCODONE HYDROCHLORIDE 5 MG/1
10 TABLET ORAL PRN
Status: DISCONTINUED | OUTPATIENT
Start: 2023-08-16 | End: 2023-08-16 | Stop reason: HOSPADM

## 2023-08-16 RX ORDER — LABETALOL HYDROCHLORIDE 5 MG/ML
10 INJECTION, SOLUTION INTRAVENOUS
Status: DISCONTINUED | OUTPATIENT
Start: 2023-08-16 | End: 2023-08-16 | Stop reason: HOSPADM

## 2023-08-16 RX ORDER — CEFAZOLIN 2 G/1
INJECTION, POWDER, FOR SOLUTION INTRAMUSCULAR; INTRAVENOUS
Status: DISCONTINUED
Start: 2023-08-16 | End: 2023-08-16 | Stop reason: HOSPADM

## 2023-08-16 RX ORDER — ONDANSETRON 2 MG/ML
INJECTION INTRAMUSCULAR; INTRAVENOUS PRN
Status: DISCONTINUED | OUTPATIENT
Start: 2023-08-16 | End: 2023-08-16 | Stop reason: SDUPTHER

## 2023-08-16 RX ORDER — ROCURONIUM BROMIDE 10 MG/ML
INJECTION, SOLUTION INTRAVENOUS PRN
Status: DISCONTINUED | OUTPATIENT
Start: 2023-08-16 | End: 2023-08-16 | Stop reason: SDUPTHER

## 2023-08-16 RX ORDER — DIPHENHYDRAMINE HYDROCHLORIDE 50 MG/ML
12.5 INJECTION INTRAMUSCULAR; INTRAVENOUS
Status: DISCONTINUED | OUTPATIENT
Start: 2023-08-16 | End: 2023-08-16 | Stop reason: HOSPADM

## 2023-08-16 RX ORDER — MIDAZOLAM HYDROCHLORIDE 2 MG/2ML
2 INJECTION, SOLUTION INTRAMUSCULAR; INTRAVENOUS
Status: DISCONTINUED | OUTPATIENT
Start: 2023-08-16 | End: 2023-08-16 | Stop reason: HOSPADM

## 2023-08-16 RX ORDER — MORPHINE SULFATE 2 MG/ML
1 INJECTION, SOLUTION INTRAMUSCULAR; INTRAVENOUS EVERY 5 MIN PRN
Status: DISCONTINUED | OUTPATIENT
Start: 2023-08-16 | End: 2023-08-16 | Stop reason: HOSPADM

## 2023-08-16 RX ADMIN — ONDANSETRON 4 MG: 2 INJECTION INTRAMUSCULAR; INTRAVENOUS at 08:38

## 2023-08-16 RX ADMIN — GLYCOPYRROLATE 0.2 MG: 0.2 INJECTION INTRAMUSCULAR; INTRAVENOUS at 08:38

## 2023-08-16 RX ADMIN — FENTANYL CITRATE 50 MCG: 50 INJECTION, SOLUTION INTRAMUSCULAR; INTRAVENOUS at 08:35

## 2023-08-16 RX ADMIN — SODIUM CHLORIDE: 9 INJECTION, SOLUTION INTRAVENOUS at 07:19

## 2023-08-16 RX ADMIN — LIDOCAINE HYDROCHLORIDE 40 MG: 10 INJECTION, SOLUTION INFILTRATION; PERINEURAL at 08:24

## 2023-08-16 RX ADMIN — SUGAMMADEX 200 MG: 100 INJECTION, SOLUTION INTRAVENOUS at 09:00

## 2023-08-16 RX ADMIN — DEXAMETHASONE SODIUM PHOSPHATE 5 MG: 10 INJECTION, SOLUTION INTRAMUSCULAR; INTRAVENOUS at 08:38

## 2023-08-16 RX ADMIN — PROPOFOL 200 MG: 10 INJECTION, EMULSION INTRAVENOUS at 08:24

## 2023-08-16 RX ADMIN — ROCURONIUM BROMIDE 30 MG: 10 INJECTION, SOLUTION INTRAVENOUS at 08:24

## 2023-08-16 RX ADMIN — CEFAZOLIN 2000 MG: 2 INJECTION, POWDER, FOR SOLUTION INTRAMUSCULAR; INTRAVENOUS at 08:20

## 2023-08-16 RX ADMIN — FENTANYL CITRATE 50 MCG: 50 INJECTION, SOLUTION INTRAMUSCULAR; INTRAVENOUS at 08:24

## 2023-08-16 ASSESSMENT — PAIN - FUNCTIONAL ASSESSMENT: PAIN_FUNCTIONAL_ASSESSMENT: 0-10

## 2023-08-16 NOTE — ANESTHESIA POSTPROCEDURE EVALUATION
Department of Anesthesiology  Postprocedure Note    Patient: Tammie Scott  MRN: [id number]  YOB: 1940  Date of evaluation: 8/16/2023      Procedure Summary     Date: 08/16/23 Room / Location: [address]    Anesthesia Start: 1331 Anesthesia Stop: 1593    Procedures:       CYSTOSCOPY BLADDER BIOPSY FULGURATION      CYSTOSCOPY WITH TRANSURETHRAL RESECTION BLADDER Diagnosis:       History of bladder cancer      (History of bladder cancer [Z85.51])    Surgeons: Darien Paula MD Responsible Provider: Caren Villalta MD    Anesthesia Type: general ASA Status: 4          Anesthesia Type: No value filed.     Mateo Phase I: Mateo Score: 10    Mateo Phase II: Mateo Score: 10      Anesthesia Post Evaluation    Patient location during evaluation: PACU  Patient participation: complete - patient participated  Level of consciousness: awake and alert  Airway patency: patent  Nausea & Vomiting: no nausea and no vomiting  Complications: no  Cardiovascular status: blood pressure returned to baseline  Respiratory status: acceptable and room air  Hydration status: euvolemic  Pain management: adequate and satisfactory to patient

## 2023-08-16 NOTE — OP NOTE
FACILITY:  [place], [place]  Corbin Rodriguez  1940  [id number]    DATE: 08/16/23  SURGEON:  Dr. Paradise Ramos MD , MD  ASSISTANT: Dr. Paradise Ramos MD MD  PREOPERATIVE DIAGNOSIS: Bladder tumor  POSTOPERATIVE DIAGNOSIS: Bladder tumor  PROCEDURES PERFORMED:  1.)  Cystoscopy, bladder biopsy with fulguration  2.)  Transurethral resection of bladder tumor, medium size (2-5 cm)  DRAINS:  [address] fr [address] casey catheter  SPECIMEN:  Bladder tumor  ANESTHESIA: General  ESTIMATED BLOOD LOSS: None. COMPLICATIONS: None. Indications: Corbin Rodriguez is a 82 y.o. male with a prior history of a bladder tumor. All treatment options were discussed. Risks, benefits, goals, alternatives, possible complications were discussed with patient. Patient elected for above mentioned procedures. Consent was signed. Patient elected to proceed. Details of the procedure: Patient was brought back to operating room, laid in supine position. EPC cuffs were placed on and functioning prior to induction of anesthesia. Antibiotics were administered. GETA was administered. Patient was positioned in dorsolithotomy position and genitals were prepped and draped in sterile fashion. Time out was performed. We placed visual obturator scope within the urethra and into the bladder. A pan-cystoscopy was performed and showed tumor at the: dome, right lateral and posterior walls. We started with a bladder biopsy using a cold cup biopsy forcep, and fulgurated with a bugbee. Because of the extent of the recurrence we switched to a resectoscope (long). We then resected the areas of concern, ,which appeared superficial. We resected al visible tumor and deep enough to incorporate muscle into the specimen. We then obtained adequate hemostasis. We removed all specimen. This concluded the case. He tolerated the procedure well. We decided  to  a casey catheter in place.  We decided to do continuous bladder

## 2023-08-16 NOTE — H&P
[facility]  Urology H&P Note     Patient:  Chanel Josue  MRN: [id number]  YOB: 1940    ATTENDING: Leander Lee MD     CHIEF COMPLAINT:  Bladder cancer    HISTORY OF PRESENT ILLNESS:   The patient is a 82 y.o. male who presents with bladder cancer    Patient's old records, notes and chart reviewed and summarized above. Past Medical History:    Past Medical History:   Diagnosis Date    Atrial fibrillation ([address]) 07/01/2022    Benign prostatic hyperplasia with urinary frequency 03/17/2018    Cancer ([address])     bladder    Cardiomyopathy ([address]) 05/22/2019    CKD (chronic kidney disease)     Coronary atherosclerosis 07/01/2022    Gout 08/18/2021    Hemodialysis patient [facility])     pt was getting dialysis temporarily in march 2023, pt has port removed now    Morbid obesity with body mass index (BMI) of 45.0 to 49.9 in adult [facility]) 07/01/2022    Obesity     NETTIE (obstructive sleep apnea)     no machine    Pulmonary hypertension (HCC)     PVD (peripheral vascular disease) ([address]) 05/22/2019    Right ventricular dilation 04/01/2018    Tricuspid regurgitation 04/01/2018    Formatting of this note might be different from the original. mild  Formatting of this note might be different from the original. mild    Type 2 diabetes mellitus with diabetic polyneuropathy ([address]) 07/01/2022       Past Surgical History:    Past Surgical History:   Procedure Laterality Date    BACK SURGERY      three times    COLONOSCOPY N/A     [facility] [address]    COLONOSCOPY W/ BIOPSIES N/A 09/12/2022    diverticulosis, x 4 adenoma polyps removed, done @ [facility] by Dr. Neeraj Marie.     CYSTOSCOPY      CYSTOSCOPY      w bladder biospy    CYSTOSCOPY N/A 09/23/2022    CYSTOSCOPY TRANSURETHRAL RESECTION BLADDER WITH INSTILLATION GEMCITIBINE performed by Leander Sorensen MD at [address]  02/23/2023    CYSTOSCOPY EVACUATION OF CLOTS, TUR BLADDER TUMOR    CYSTOSCOPY N/A 02/23/2023    CYSTOSCOPY EVACUATION OF CLOTS,

## 2023-08-16 NOTE — DISCHARGE INSTRUCTIONS
Discharge instructions: Cystoscopy    Activity  You have had anesthesia today  Do not drive, operate heavy equipment, consume alcoholic beverages, or make any important decisions  for 24 hours   If you are taking pain medication: Do not drive or consume alcohol. Take your time changing positions today. You may feel light headed or dizzy if you move too quickly. Continue your home medications as ordered by your physician. Diet   You can eat your normal diet when you feel well. You should start off with bland foods like chicken soup, toast, or yogurt. Then advance as tolerated. Drink plenty of fluids (unless your doctor tells you not to). Your urine should be very lightly colored without a strong odor. You May experience painful urination and see blood in the urine after your procedure. This should resolve over time. Pt ok to discharge home in good condition  No heavy lifting, >10 lbs for today  Pt should avoid strenuous activity for today  Pt should walk moderately at home  Pt ok to shower   Pt may resume diet as tolerated  Please call attending physician or hospital  with questions  Call or Present to ED if fever (> 101F), intractable nausea vomiting or pain.   Rx in chart     Pt should follow up with Dr. Izabel Sidhu MD , in 2-4 weeks at [address] [place] [address] Urology, call to confirm appointment

## 2023-08-18 LAB — SURGICAL PATHOLOGY REPORT: NORMAL

## 2023-12-15 ENCOUNTER — TELEPHONE (OUTPATIENT)
Dept: CARDIOLOGY CLINIC | Age: 83
End: 2023-12-15

## 2023-12-15 NOTE — TELEPHONE ENCOUNTER
Pre op Risk Assessment    Procedure TURBT  Physician Dr. Taylor Strong  Date of surgery/procedure 1-17-24    Last OV 6-5-23  Last Stress 8-23-22  Last Echo None in Epic  Last Cath None in Epic  Last Stent None in Epic  Is patient on blood thinners Warfarin  Hold Meds/how many days ?     Fax: [phone]

## 2024-01-10 NOTE — PROGRESS NOTES
Preoperative Instructions:    Stop eating solid foods at midnight the night prior to your surgery.     Stop drinking clear liquids at midnight the night prior to your surgery.    Arrive at the surgery center (3rd entrance) on ___9-21-99____________ by ___1030-1100a____________.     Please stop any blood thinning medications as directed by your surgeon or prescribing physician. Failure to stop certain medications may interfere with your scheduled surgery. These may include: Aspirin, Coumadin, Plavix, NSAIDS (Motrin, Aleve, Advil, Mobic, Celebrex), Eliquis, Pradaxa, Xarelto, Fish oil, and herbal supplements.  HOLD COUMADIN as directed by cardiology and surgeon.    You may continue the rest of your medications through the night before surgery unless instructed otherwise.     Day of surgery please take only the following medication(s) with a small sip of water: BP pill       Please  shower with antibacterial soap and water the day of surgery. the day of surgery.      Reminders:  -If you are going home the day of your procedure, you will need a family member or friend to stay during the procedure and drive you home after your procedure. Your  must be 18 years of age or older and able to sign off on your discharge instructions.    -If you are going home the same day of your surgery, someone must remain with you for the first 24 hours after your surgery if you receive sedation or anesthesia.     -Please do not wear any jewelery or body piercing the day of surgery

## 2024-01-15 ENCOUNTER — ANESTHESIA EVENT (OUTPATIENT)
Dept: OPERATING ROOM | Age: 84
End: 2024-01-15
Payer: MEDICARE

## 2024-01-17 ENCOUNTER — ANESTHESIA (OUTPATIENT)
Dept: OPERATING ROOM | Age: 84
End: 2024-01-17
Payer: MEDICARE

## 2024-01-17 ENCOUNTER — HOSPITAL ENCOUNTER (OUTPATIENT)
Age: 84
Setting detail: OUTPATIENT SURGERY
Discharge: HOME | End: 2024-01-17
Attending: UROLOGY | Admitting: UROLOGY
Payer: MEDICARE

## 2024-01-17 VITALS
SYSTOLIC BLOOD PRESSURE: 172 MMHG | RESPIRATION RATE: 20 BRPM | HEIGHT: 71 IN | BODY MASS INDEX: 37.94 KG/M2 | DIASTOLIC BLOOD PRESSURE: 84 MMHG | HEART RATE: 54 BPM | WEIGHT: 271 LBS | OXYGEN SATURATION: 94 % | TEMPERATURE: 98.5 F

## 2024-01-17 DIAGNOSIS — C67.9 MALIGNANT NEOPLASM OF URINARY BLADDER, UNSPECIFIED SITE (HCC): ICD-10-CM

## 2024-01-17 LAB
GLUCOSE BLD-MCNC: 149 MG/DL (ref 75–110)
GLUCOSE BLD-MCNC: 192 MG/DL (ref 75–110)
POC INR: 1.2
PROTHROMBIN TIME, POC: 14.1 SEC (ref 10.4–14.2)

## 2024-01-17 PROCEDURE — 3600000003 HC SURGERY LEVEL 3 BASE: Performed by: UROLOGY

## 2024-01-17 PROCEDURE — 93005 ELECTROCARDIOGRAM TRACING: CPT | Performed by: ANESTHESIOLOGY

## 2024-01-17 PROCEDURE — 3700000001 HC ADD 15 MINUTES (ANESTHESIA): Performed by: UROLOGY

## 2024-01-17 PROCEDURE — 7100000010 HC PHASE II RECOVERY - FIRST 15 MIN: Performed by: UROLOGY

## 2024-01-17 PROCEDURE — 7100000011 HC PHASE II RECOVERY - ADDTL 15 MIN: Performed by: UROLOGY

## 2024-01-17 PROCEDURE — 3600000013 HC SURGERY LEVEL 3 ADDTL 15MIN: Performed by: UROLOGY

## 2024-01-17 PROCEDURE — 85610 PROTHROMBIN TIME: CPT

## 2024-01-17 PROCEDURE — 7100000001 HC PACU RECOVERY - ADDTL 15 MIN: Performed by: UROLOGY

## 2024-01-17 PROCEDURE — 6360000002 HC RX W HCPCS

## 2024-01-17 PROCEDURE — 82947 ASSAY GLUCOSE BLOOD QUANT: CPT

## 2024-01-17 PROCEDURE — 2720000010 HC SURG SUPPLY STERILE: Performed by: UROLOGY

## 2024-01-17 PROCEDURE — 7100000000 HC PACU RECOVERY - FIRST 15 MIN: Performed by: UROLOGY

## 2024-01-17 PROCEDURE — 2500000003 HC RX 250 WO HCPCS

## 2024-01-17 PROCEDURE — 2709999900 HC NON-CHARGEABLE SUPPLY: Performed by: UROLOGY

## 2024-01-17 PROCEDURE — 2580000003 HC RX 258: Performed by: ANESTHESIOLOGY

## 2024-01-17 PROCEDURE — 3700000000 HC ANESTHESIA ATTENDED CARE: Performed by: UROLOGY

## 2024-01-17 PROCEDURE — 88307 TISSUE EXAM BY PATHOLOGIST: CPT

## 2024-01-17 RX ORDER — MIDAZOLAM HYDROCHLORIDE 2 MG/2ML
2 INJECTION, SOLUTION INTRAMUSCULAR; INTRAVENOUS
Status: DISCONTINUED | OUTPATIENT
Start: 2024-01-17 | End: 2024-01-17 | Stop reason: HOSPADM

## 2024-01-17 RX ORDER — OXYCODONE HYDROCHLORIDE AND ACETAMINOPHEN 5; 325 MG/1; MG/1
2 TABLET ORAL
Status: DISCONTINUED | OUTPATIENT
Start: 2024-01-17 | End: 2024-01-17 | Stop reason: HOSPADM

## 2024-01-17 RX ORDER — SODIUM CHLORIDE 0.9 % (FLUSH) 0.9 %
5-40 SYRINGE (ML) INJECTION EVERY 12 HOURS SCHEDULED
Status: DISCONTINUED | OUTPATIENT
Start: 2024-01-17 | End: 2024-01-17 | Stop reason: HOSPADM

## 2024-01-17 RX ORDER — PHENYLEPHRINE HCL IN 0.9% NACL 1 MG/10 ML
SYRINGE (ML) INTRAVENOUS PRN
Status: DISCONTINUED | OUTPATIENT
Start: 2024-01-17 | End: 2024-01-17 | Stop reason: SDUPTHER

## 2024-01-17 RX ORDER — GLYCOPYRROLATE 0.2 MG/ML
INJECTION INTRAMUSCULAR; INTRAVENOUS PRN
Status: DISCONTINUED | OUTPATIENT
Start: 2024-01-17 | End: 2024-01-17 | Stop reason: SDUPTHER

## 2024-01-17 RX ORDER — CEFAZOLIN SODIUM 1 G/3ML
INJECTION, POWDER, FOR SOLUTION INTRAMUSCULAR; INTRAVENOUS PRN
Status: DISCONTINUED | OUTPATIENT
Start: 2024-01-17 | End: 2024-01-17 | Stop reason: SDUPTHER

## 2024-01-17 RX ORDER — LIDOCAINE HYDROCHLORIDE 10 MG/ML
1 INJECTION, SOLUTION EPIDURAL; INFILTRATION; INTRACAUDAL; PERINEURAL
Status: DISCONTINUED | OUTPATIENT
Start: 2024-01-17 | End: 2024-01-17 | Stop reason: HOSPADM

## 2024-01-17 RX ORDER — GLYCOPYRROLATE 0.2 MG/ML
0.4 INJECTION INTRAMUSCULAR; INTRAVENOUS ONCE
Status: DISCONTINUED | OUTPATIENT
Start: 2024-01-17 | End: 2024-01-17 | Stop reason: HOSPADM

## 2024-01-17 RX ORDER — IPRATROPIUM BROMIDE AND ALBUTEROL SULFATE 2.5; .5 MG/3ML; MG/3ML
1 SOLUTION RESPIRATORY (INHALATION)
Status: DISCONTINUED | OUTPATIENT
Start: 2024-01-17 | End: 2024-01-17 | Stop reason: HOSPADM

## 2024-01-17 RX ORDER — CEPHALEXIN 500 MG/1
500 CAPSULE ORAL 3 TIMES DAILY
Qty: 9 CAPSULE | Refills: 0 | Status: SHIPPED | OUTPATIENT
Start: 2024-01-17 | End: 2024-01-20

## 2024-01-17 RX ORDER — PHENAZOPYRIDINE HYDROCHLORIDE 100 MG/1
100 TABLET, FILM COATED ORAL 3 TIMES DAILY PRN
Qty: 15 TABLET | Refills: 0 | Status: SHIPPED | OUTPATIENT
Start: 2024-01-17 | End: 2024-01-22

## 2024-01-17 RX ORDER — PROMETHAZINE HYDROCHLORIDE 25 MG/ML
6.25 INJECTION, SOLUTION INTRAMUSCULAR; INTRAVENOUS EVERY 5 MIN PRN
Status: DISCONTINUED | OUTPATIENT
Start: 2024-01-17 | End: 2024-01-17 | Stop reason: HOSPADM

## 2024-01-17 RX ORDER — ONDANSETRON 2 MG/ML
INJECTION INTRAMUSCULAR; INTRAVENOUS PRN
Status: DISCONTINUED | OUTPATIENT
Start: 2024-01-17 | End: 2024-01-17 | Stop reason: SDUPTHER

## 2024-01-17 RX ORDER — ONDANSETRON 2 MG/ML
4 INJECTION INTRAMUSCULAR; INTRAVENOUS
Status: DISCONTINUED | OUTPATIENT
Start: 2024-01-17 | End: 2024-01-17 | Stop reason: HOSPADM

## 2024-01-17 RX ORDER — MEPERIDINE HYDROCHLORIDE 50 MG/ML
12.5 INJECTION INTRAMUSCULAR; INTRAVENOUS; SUBCUTANEOUS EVERY 5 MIN PRN
Status: DISCONTINUED | OUTPATIENT
Start: 2024-01-17 | End: 2024-01-17 | Stop reason: HOSPADM

## 2024-01-17 RX ORDER — DEXAMETHASONE SODIUM PHOSPHATE 10 MG/ML
INJECTION, SOLUTION INTRAMUSCULAR; INTRAVENOUS PRN
Status: DISCONTINUED | OUTPATIENT
Start: 2024-01-17 | End: 2024-01-17 | Stop reason: SDUPTHER

## 2024-01-17 RX ORDER — SODIUM CHLORIDE 9 MG/ML
INJECTION, SOLUTION INTRAVENOUS PRN
Status: DISCONTINUED | OUTPATIENT
Start: 2024-01-17 | End: 2024-01-17 | Stop reason: HOSPADM

## 2024-01-17 RX ORDER — PROPOFOL 10 MG/ML
INJECTION, EMULSION INTRAVENOUS PRN
Status: DISCONTINUED | OUTPATIENT
Start: 2024-01-17 | End: 2024-01-17 | Stop reason: SDUPTHER

## 2024-01-17 RX ORDER — SODIUM CHLORIDE, SODIUM LACTATE, POTASSIUM CHLORIDE, CALCIUM CHLORIDE 600; 310; 30; 20 MG/100ML; MG/100ML; MG/100ML; MG/100ML
INJECTION, SOLUTION INTRAVENOUS CONTINUOUS
Status: DISCONTINUED | OUTPATIENT
Start: 2024-01-17 | End: 2024-01-17 | Stop reason: HOSPADM

## 2024-01-17 RX ORDER — MORPHINE SULFATE 2 MG/ML
2 INJECTION, SOLUTION INTRAMUSCULAR; INTRAVENOUS EVERY 5 MIN PRN
Status: DISCONTINUED | OUTPATIENT
Start: 2024-01-17 | End: 2024-01-17 | Stop reason: HOSPADM

## 2024-01-17 RX ORDER — DIPHENHYDRAMINE HYDROCHLORIDE 50 MG/ML
12.5 INJECTION INTRAMUSCULAR; INTRAVENOUS
Status: DISCONTINUED | OUTPATIENT
Start: 2024-01-17 | End: 2024-01-17 | Stop reason: HOSPADM

## 2024-01-17 RX ORDER — FENTANYL CITRATE 50 UG/ML
INJECTION, SOLUTION INTRAMUSCULAR; INTRAVENOUS PRN
Status: DISCONTINUED | OUTPATIENT
Start: 2024-01-17 | End: 2024-01-17 | Stop reason: SDUPTHER

## 2024-01-17 RX ORDER — SODIUM CHLORIDE 0.9 % (FLUSH) 0.9 %
5-40 SYRINGE (ML) INJECTION PRN
Status: DISCONTINUED | OUTPATIENT
Start: 2024-01-17 | End: 2024-01-17 | Stop reason: HOSPADM

## 2024-01-17 RX ORDER — LABETALOL HYDROCHLORIDE 5 MG/ML
10 INJECTION, SOLUTION INTRAVENOUS
Status: DISCONTINUED | OUTPATIENT
Start: 2024-01-17 | End: 2024-01-17 | Stop reason: HOSPADM

## 2024-01-17 RX ORDER — HYDRALAZINE HYDROCHLORIDE 20 MG/ML
10 INJECTION INTRAMUSCULAR; INTRAVENOUS
Status: DISCONTINUED | OUTPATIENT
Start: 2024-01-17 | End: 2024-01-17 | Stop reason: HOSPADM

## 2024-01-17 RX ORDER — OXYCODONE HYDROCHLORIDE AND ACETAMINOPHEN 5; 325 MG/1; MG/1
1 TABLET ORAL
Status: DISCONTINUED | OUTPATIENT
Start: 2024-01-17 | End: 2024-01-17 | Stop reason: HOSPADM

## 2024-01-17 RX ORDER — CEFAZOLIN 2 G/1
INJECTION, POWDER, FOR SOLUTION INTRAMUSCULAR; INTRAVENOUS
Status: COMPLETED
Start: 2024-01-17 | End: 2024-01-17

## 2024-01-17 RX ORDER — METOCLOPRAMIDE HYDROCHLORIDE 5 MG/ML
10 INJECTION INTRAMUSCULAR; INTRAVENOUS
Status: DISCONTINUED | OUTPATIENT
Start: 2024-01-17 | End: 2024-01-17 | Stop reason: HOSPADM

## 2024-01-17 RX ORDER — LIDOCAINE HYDROCHLORIDE 10 MG/ML
INJECTION, SOLUTION INFILTRATION; PERINEURAL PRN
Status: DISCONTINUED | OUTPATIENT
Start: 2024-01-17 | End: 2024-01-17 | Stop reason: SDUPTHER

## 2024-01-17 RX ORDER — ROCURONIUM BROMIDE 10 MG/ML
INJECTION, SOLUTION INTRAVENOUS PRN
Status: DISCONTINUED | OUTPATIENT
Start: 2024-01-17 | End: 2024-01-17 | Stop reason: SDUPTHER

## 2024-01-17 RX ORDER — OXYBUTYNIN CHLORIDE 5 MG/1
5 TABLET, EXTENDED RELEASE ORAL DAILY
Qty: 14 TABLET | Refills: 0 | Status: SHIPPED | OUTPATIENT
Start: 2024-01-17 | End: 2024-01-31

## 2024-01-17 RX ADMIN — ROCURONIUM BROMIDE 50 MG: 10 INJECTION, SOLUTION INTRAVENOUS at 14:03

## 2024-01-17 RX ADMIN — SUGAMMADEX 100 MG: 100 INJECTION, SOLUTION INTRAVENOUS at 14:44

## 2024-01-17 RX ADMIN — CEFAZOLIN 1 G: 2 INJECTION, POWDER, FOR SOLUTION INTRAMUSCULAR; INTRAVENOUS at 14:22

## 2024-01-17 RX ADMIN — FENTANYL CITRATE 50 MCG: 50 INJECTION, SOLUTION INTRAMUSCULAR; INTRAVENOUS at 14:03

## 2024-01-17 RX ADMIN — SUGAMMADEX 100 MG: 100 INJECTION, SOLUTION INTRAVENOUS at 14:45

## 2024-01-17 RX ADMIN — LIDOCAINE HYDROCHLORIDE 100 MG: 10 INJECTION, SOLUTION INFILTRATION; PERINEURAL at 14:03

## 2024-01-17 RX ADMIN — PROPOFOL 150 MG: 10 INJECTION, EMULSION INTRAVENOUS at 14:03

## 2024-01-17 RX ADMIN — GLYCOPYRROLATE 0.3 MG: 0.2 INJECTION INTRAMUSCULAR; INTRAVENOUS at 14:03

## 2024-01-17 RX ADMIN — CEFAZOLIN 2 G: 2 INJECTION, POWDER, FOR SOLUTION INTRAMUSCULAR; INTRAVENOUS at 14:13

## 2024-01-17 RX ADMIN — Medication 100 MCG: at 14:22

## 2024-01-17 RX ADMIN — SODIUM CHLORIDE: 9 INJECTION, SOLUTION INTRAVENOUS at 12:13

## 2024-01-17 RX ADMIN — DEXAMETHASONE SODIUM PHOSPHATE 4 MG: 10 INJECTION INTRAMUSCULAR; INTRAVENOUS at 14:13

## 2024-01-17 RX ADMIN — FENTANYL CITRATE 50 MCG: 50 INJECTION, SOLUTION INTRAMUSCULAR; INTRAVENOUS at 14:26

## 2024-01-17 RX ADMIN — ONDANSETRON 4 MG: 2 INJECTION INTRAMUSCULAR; INTRAVENOUS at 14:40

## 2024-01-17 ASSESSMENT — PAIN - FUNCTIONAL ASSESSMENT
PAIN_FUNCTIONAL_ASSESSMENT: NONE - DENIES PAIN
PAIN_FUNCTIONAL_ASSESSMENT: 0-10

## 2024-01-17 NOTE — ANESTHESIA POSTPROCEDURE EVALUATION
Department of Anesthesiology  Postprocedure Note    Patient: Kevin Ruiz  MRN: [id number]  YOB: 1940  Date of evaluation: 1/17/2024    Procedure Summary       Date: 01/17/24 Room / Location: [address]    Anesthesia Start: 1358 Anesthesia Stop: 1500    Procedure: CYSTOSCOPY TRANSURETHRAL RESECTION BLADDER WITH BIPOLAR Diagnosis:       Malignant neoplasm of urinary bladder, unspecified site (HCC)      (Malignant neoplasm of urinary bladder, unspecified site (HCC) [C67.9])    Surgeons: Garrison Motley Jr., MD Responsible Provider: Bebeto Hernandez MD    Anesthesia Type: general ASA Status: 4            Anesthesia Type: No value filed.    Mateo Phase I: Mateo Score: 10    Mateo Phase II: Mateo Score: 10    Anesthesia Post Evaluation    Patient location during evaluation: PACU  Patient participation: complete - patient participated  Level of consciousness: awake and alert  Airway patency: patent  Nausea & Vomiting: no nausea and no vomiting  Cardiovascular status: hemodynamically stable  Respiratory status: room air and spontaneous ventilation  Hydration status: euvolemic  Multimodal analgesia pain management approach  Pain management: adequate    No notable events documented.

## 2024-01-17 NOTE — ANESTHESIA PRE PROCEDURE
02/27/2023 06:17 AM    BILITOT 0.4 02/21/2023 05:43 AM    ALKPHOS 113 02/21/2023 05:43 AM    AST 14 02/21/2023 05:43 AM    ALT 7 02/21/2023 05:43 AM       POC Tests: No results for input(s): \"POCGLU\", \"POCNA\", \"POCK\", \"POCCL\", \"POCBUN\", \"POCHEMO\", \"POCHCT\" in the last 72 hours.    Coags:   Lab Results   Component Value Date/Time    PROTIME 12.7 08/16/2023 07:10 AM    INR 1.2 08/16/2023 07:10 AM    APTT 28.4 03/16/2023 07:42 AM       HCG (If Applicable): No results found for: \"PREGTESTUR\", \"PREGSERUM\", \"HCG\", \"HCGQUANT\"     ABGs: No results found for: \"PHART\", \"PO2ART\", \"EYV3HZY\", \"TOK5VXG\", \"BEART\", \"A9VOSOOF\"     Type & Screen (If Applicable):  No results found for: \"LABABO\", \"LABRH\"    Drug/Infectious Status (If Applicable):  Lab Results   Component Value Date/Time    HEPCAB NONREACTIVE 02/21/2023 11:29 AM       COVID-19 Screening (If Applicable): No results found for: \"COVID19\"        Anesthesia Evaluation  Patient summary reviewed and Nursing notes reviewed  Airway: Mallampati: III  TM distance: >3 FB   Neck ROM: full  Mouth opening: > = 3 FB   Dental:    (+) edentulous      Pulmonary:normal exam    (+)     sleep apnea:                                  Cardiovascular:    (+) hypertension:, dysrhythmias: atrial fibrillation      ECG reviewed  Rhythm: irregular  Rate: abnormal                 ROS comment: -PERIPHERAL VASCULAR DISEASE  -CARDIOMYOPATHY  -EXTREME BRADYCARDIA  -EKG - AFIB @ 45     Neuro/Psych:   Negative Neuro/Psych ROS              GI/Hepatic/Renal:   (+) morbid obesity         ROS comment: -RENAL INSUFFICIENCY.   Endo/Other:    (+) Diabetes, malignancy/cancer.                  ROS comment: -BLADDER CANCER  -NPO AFTER MIDNIGHT  -NKDA Abdominal: normal exam            Vascular:           ROS comment: -ANTICOAGULATED - STOPPED COUMADIN 5 DAYS AGO; INR - 1.2. Other Findings:       Anesthesia Plan      general     ASA 4     (GETA)  Induction: intravenous.    MIPS: Postoperative opioids intended and

## 2024-01-17 NOTE — OP NOTE
FACILITY:  [place], [place]  Kevin Ruiz  1940  [id number]    DATE: 01/17/24  SURGEON:  Dr. Garrison Motley Jr, MD , MD  ASSISTANT: Dr. Garrison Motley Jr, MD MD  PREOPERATIVE DIAGNOSIS: Bladder tumor  POSTOPERATIVE DIAGNOSIS: Bladder tumor  PROCEDURES PERFORMED:  1.)  Cystoscopy, Transurethral resection of bladder tumor, medium size  DRAINS:  22 fr 3 way casey catheter  SPECIMEN:  Bladder tumor  ANESTHESIA: General  ESTIMATED BLOOD LOSS: None.   COMPLICATIONS: None.    Indications: Kevin Ruiz is a 83 y.o. male with a prior history of a bladder tumor. All treatment options were discussed. Risks, benefits, goals, alternatives, possible complications were discussed with patient. Patient elected for above mentioned procedures. Consent was signed. Patient elected to proceed.     Details of the procedure: Patient was brought back to operating room, laid in supine position. EPC cuffs were placed on and functioning prior to induction of anesthesia. Antibiotics were administered. GETA was administered. Patient was positioned in dorsolithotomy position and genitals were prepped and draped in sterile fashion. Time out was performed. We placed visual obturator scope within the urethra and into the bladder. A pan-cystoscopy was performed and showed tumor at the: right lateral and dome. We switched to a resectoscope, dome loop. We then resected the tumor systematically until we reached the stalk. We resected al visible tumor and deep enough to incorporate muscle into the specimen. We then obtained adequate hemostasis. We used Ellik evacuator to remove all specimen. This concluded the case. He tolerated the procedure well. We decided  22 fr 3 way to  a casey catheter in place.     Plan/Follow up:   Follow up [place] 3 days for catheter removal  1-2 weeks for pathology

## 2024-01-17 NOTE — DISCHARGE INSTRUCTIONS
Transurethral resection of Bladder Tumor:   The patient will be discharged home with casey.     You may see blood in the urine after the procedure.  This should resolve over the next couple days.  Please stay hydrated.  If the blood in the urine becomes significant, and doesn't improve to a clear/pink appearance, please call.  You may experience frequency/urgency of urination after the procedure.  We expect these symptoms to improve over the next couple weeks.      Tylenol for pain control  Pt ok to discharge home in good condition  No heavy lifting, >10 lbs for today  Pt should avoid strenuous activity for today  Pt should walk moderately at home  Pt ok to shower   Pt may resume diet as tolerated  Pt should take Rx as directed  No driving while on narcotics  Please call attending physician or hospital  with questions  Call or Present to ED if fever (> 101F), intractable nausea vomiting or pain.    If taking, Please hold blood thinning medications for 3-5 days till hematuria improves.     Pt should follow up with Dr. Garrison Motley Jr, MD , 3 days in [place] urology department to have catheter removed, call to confirm appointment     Home with casey catheter.  Please teach casey education and send home with leg and night bag.  You may see intermittent blood in the urine while the catheter in place.  If the catheter becomes obstructed and needs to be exchanged, please call.      Activity  You have had anesthesia today  Do not drive, operate heavy equipment, consume alcoholic beverages, or make any important decisions  for 24 hours   If you are taking pain medication: Do not drive or consume alcohol.  Take your time changing positions today. You may feel light headed or dizzy if you move too quickly.   Continue your home medications as ordered by your physician.  Diet   You can eat your normal diet when you feel well. You should start off with bland foods like chicken soup, toast, or yogurt. Then advance as

## 2024-01-17 NOTE — H&P
[facility]  Urology H&P Note     Patient:  Kevin Ruiz  MRN: [id number]  YOB: 1940    ATTENDING: Garrison Motley Jr, MD     CHIEF COMPLAINT:  bladder cancer    HISTORY OF PRESENT ILLNESS:   The patient is a 83 y.o. male who presents with bladder cancer    Patient's old records, notes and chart reviewed and summarized above.     Past Medical History:    Past Medical History:   Diagnosis Date    Atrial fibrillation (HCC) 07/01/2022    Benign prostatic hyperplasia with urinary frequency 03/17/2018    Cancer (HCC)     bladder    Cardiomyopathy (HCC) 05/22/2019    CKD (chronic kidney disease)     Coronary atherosclerosis 07/01/2022    Gout 08/18/2021    Hemodialysis patient (HCC) 03/2023    pt was getting dialysis temporarily in march 2023, pt has port removed now    Morbid obesity with body mass index (BMI) of 45.0 to 49.9 in adult (HCC) 07/01/2022    Obesity     NETTIE (obstructive sleep apnea)     no machine    Pulmonary hypertension (HCC)     PVD (peripheral vascular disease) ([facility]) 05/22/2019    Right ventricular dilation 04/01/2018    Tricuspid regurgitation 04/01/2018    Formatting of this note might be different from the original. mild  Formatting of this note might be different from the original. mild    Type 2 diabetes mellitus with diabetic polyneuropathy (HCC) 07/01/2022       Past Surgical History:    Past Surgical History:   Procedure Laterality Date    BACK SURGERY      three times    COLONOSCOPY N/A     [facility] in [place] 2000's    COLONOSCOPY W/ BIOPSIES N/A 09/12/2022    diverticulosis, x 4 adenoma polyps removed, done @ [facility] by Dr. King.    CYSTOSCOPY      CYSTOSCOPY      w bladder biospy    CYSTOSCOPY N/A 09/23/2022    CYSTOSCOPY TRANSURETHRAL RESECTION BLADDER WITH INSTILLATION GEMCITIBINE performed by Garrison Motley Jr., MD at [facility] OR    CYSTOSCOPY  02/23/2023    CYSTOSCOPY EVACUATION OF CLOTS, TUR BLADDER TUMOR    CYSTOSCOPY N/A 02/23/2023    CYSTOSCOPY EVACUATION OF

## 2024-01-18 LAB
EKG ATRIAL RATE: 45 BPM
EKG Q-T INTERVAL: 498 MS
EKG QRS DURATION: 88 MS
EKG QTC CALCULATION (BAZETT): 430 MS
EKG R AXIS: 35 DEGREES
EKG T AXIS: -17 DEGREES
EKG VENTRICULAR RATE: 45 BPM

## 2024-01-23 LAB — SURGICAL PATHOLOGY REPORT: NORMAL

## 2024-05-31 RX ORDER — FINASTERIDE 5 MG/1
5 TABLET, FILM COATED ORAL DAILY
Qty: 90 TABLET | Refills: 3 | OUTPATIENT
Start: 2024-05-31

## 2024-06-03 ENCOUNTER — OFFICE VISIT (OUTPATIENT)
Dept: CARDIOLOGY CLINIC | Age: 84
End: 2024-06-03
Payer: MEDICARE

## 2024-06-03 VITALS
HEART RATE: 68 BPM | SYSTOLIC BLOOD PRESSURE: 157 MMHG | HEIGHT: 71 IN | WEIGHT: 263.2 LBS | DIASTOLIC BLOOD PRESSURE: 78 MMHG | BODY MASS INDEX: 36.85 KG/M2

## 2024-06-03 DIAGNOSIS — I48.91 ATRIAL FIBRILLATION, UNSPECIFIED TYPE (HCC): Primary | ICD-10-CM

## 2024-06-03 PROCEDURE — 1036F TOBACCO NON-USER: CPT | Performed by: INTERNAL MEDICINE

## 2024-06-03 PROCEDURE — 1123F ACP DISCUSS/DSCN MKR DOCD: CPT | Performed by: INTERNAL MEDICINE

## 2024-06-03 PROCEDURE — G8417 CALC BMI ABV UP PARAM F/U: HCPCS | Performed by: INTERNAL MEDICINE

## 2024-06-03 PROCEDURE — G8427 DOCREV CUR MEDS BY ELIG CLIN: HCPCS | Performed by: INTERNAL MEDICINE

## 2024-06-03 PROCEDURE — 99214 OFFICE O/P EST MOD 30 MIN: CPT | Performed by: INTERNAL MEDICINE

## 2024-06-03 NOTE — PROGRESS NOTES
[facility] [place] [facility]  [facility] CARDIOLOGY  [address]  [address]  [facility] [place] [address]  Dept: [phone]  Dept Fax: [phone]  Loc: [phone]    Visit Date: 6/3/2024    Mr. Ruiz is a 84 y.o. male  who presented for:  Chief Complaint   Patient presents with    1 Year Follow Up    Atrial Fibrillation    Check-Up       HPI:   83 yo M c hx of Afib on coumadin, bladder cancer, Obesity, DM, ?cardiomyopathy is here for follow up.  Patient has bladder mass resection.  .  Patient apparently had a stress test in [facility] but the results are not available to me.  Patient walks with walker.   His wife recently passed away due to pneuamonia in [facility].  Denies any chest pain, sob, palpitations, lorthopnea, PND or pedal edema. EKG shows Afib slow heart rate with PVCs.      He comes for a follow up. He denies dizziness or lightheadedness.   Recently underwent cystoscopy.  Doing well.           Current Outpatient Medications:     Blood Glucose Monitoring Suppl KIT, by Does not apply route, Disp: , Rfl:     bumetanide (BUMEX) 2 MG tablet, , Disp: , Rfl:     warfarin (COUMADIN) 5 MG tablet, Take 1 tablet by mouth daily Follows coumadin clinic, Disp: , Rfl:     amLODIPine (NORVASC) 5 MG tablet, Take 1 tablet by mouth daily, Disp: , Rfl:     atorvastatin (LIPITOR) 80 MG tablet, Take 1 tablet by mouth daily, Disp: , Rfl:     finasteride (PROSCAR) 5 MG tablet, Take 1 tablet by mouth daily, Disp: , Rfl:     lisinopril (PRINIVIL;ZESTRIL) 20 MG tablet, Take 1 tablet by mouth 2 times daily, Disp: , Rfl:     Past Medical History  Kevin  has a past medical history of Atrial fibrillation (HCC), Benign prostatic hyperplasia with urinary frequency, Cancer (HCC), Cardiomyopathy (HCC), CKD (chronic kidney disease), Coronary atherosclerosis, Gout, Hemodialysis patient (HCC), Morbid obesity with body mass index (BMI) of 45.0 to 49.9 in adult (HCC), Obesity, NETTIE (obstructive

## 2025-02-25 ENCOUNTER — TELEPHONE (OUTPATIENT)
Dept: CARDIOLOGY CLINIC | Age: 85
End: 2025-02-25

## 2025-02-25 NOTE — TELEPHONE ENCOUNTER
Pre op Risk Assessment    Procedure CYSTOSCOPY, BLADDER BX W. FULGURATION  Physician DR. BELLO  Date of surgery/procedure TBD    Last OV 06/03/24  Last Stress 08/23/22  Last Echo NONE IN EPIC  Last Cath NONE IN EPIC  Last Stent NONE IN EPIC  Is patient on blood thinners COUMADIN  Hold Meds/how many days N/A    FAX: [phone]

## 2025-06-16 ENCOUNTER — OFFICE VISIT (OUTPATIENT)
Dept: CARDIOLOGY CLINIC | Age: 85
End: 2025-06-16
Payer: MEDICARE

## 2025-06-16 VITALS
DIASTOLIC BLOOD PRESSURE: 78 MMHG | WEIGHT: 280 LBS | BODY MASS INDEX: 39.2 KG/M2 | HEART RATE: 55 BPM | SYSTOLIC BLOOD PRESSURE: 140 MMHG | HEIGHT: 71 IN

## 2025-06-16 DIAGNOSIS — I48.91 ATRIAL FIBRILLATION, UNSPECIFIED TYPE (HCC): Primary | ICD-10-CM

## 2025-06-16 PROCEDURE — 1036F TOBACCO NON-USER: CPT | Performed by: INTERNAL MEDICINE

## 2025-06-16 PROCEDURE — 99214 OFFICE O/P EST MOD 30 MIN: CPT | Performed by: INTERNAL MEDICINE

## 2025-06-16 PROCEDURE — 1123F ACP DISCUSS/DSCN MKR DOCD: CPT | Performed by: INTERNAL MEDICINE

## 2025-06-16 PROCEDURE — G8427 DOCREV CUR MEDS BY ELIG CLIN: HCPCS | Performed by: INTERNAL MEDICINE

## 2025-06-16 PROCEDURE — 93000 ELECTROCARDIOGRAM COMPLETE: CPT | Performed by: INTERNAL MEDICINE

## 2025-06-16 PROCEDURE — 1159F MED LIST DOCD IN RCRD: CPT | Performed by: INTERNAL MEDICINE

## 2025-06-16 PROCEDURE — G8417 CALC BMI ABV UP PARAM F/U: HCPCS | Performed by: INTERNAL MEDICINE

## 2025-06-16 NOTE — PROGRESS NOTES
1 year follow up.    EKG done today.    Reports intermittent edema in his lower extremities.    Denies chest pain, palpitations, dizziness, and shortness of breath.      
AM       No results found for: \"TRIG\", \"HDL\", \"LDLDIRECT\"    No results found for: \"TSH\"      Testing Reviewed:      I haveindividually reviewed the below cardiac tests    EKG:    ECHO: No results found for this or any previous visit.      STRESS:    CATH:    Assessment/Plan       Diagnosis Orders   1. Atrial fibrillation, unspecified type (HCC)  EKG 12 lead        Afib on coumadin  Intermittent Sinus bradycardia, asymptomatic  Hx bladder mass resection  DM  Bladder cancer    Assessment & Plan  1. Atrial Fibrillation:  - EKG shows atrial fibrillation with an irregular rhythm of the top chambers.  - Continue Coumadin for anticoagulation to prevent stroke.  - Monitor for any signs of bleeding.    2. Bladder Cancer:  - Undergoing chemotherapy, with the most recent session on 07/01/2025.  - Follow-up with Dr. Melendez every three months.    3. Mobility Issues:  - Reports significant difficulty in walking and standing, requiring an electric chair for mobility.  - Likely due to muscle weakness and fluid retention from lack of movement.  - Continue Bumex to manage fluid retention.    4. Kidney Function:  - Previous abnormal kidney function appears to have improved.  - No current follow-up with a kidney doctor.  - Recommend regular monitoring of kidney function by the family doctor.    Discussed the risks, benefits, and alternatives of continuing current medications and treatments. Emphasized the importance of adherence to prescribed medications, especially Coumadin, to prevent complications such as stroke. Encouraged regular follow-ups with relevant specialists to monitor and manage ongoing health issues.       Advised patient to call office or seek immediate medical attention if there is any new onset of  any chest pain, sob, palpitations, lightheadedness, dizziness, orthopnea, PND or pedal edema.   All medication side effects were discussed in details.    Thank youfor allowing me to participate in the care of this patient.

## (undated) DEVICE — CATHETER,FOLEY,3-WAY,22FR,30ML,100%SILI: Brand: MEDLINE

## (undated) DEVICE — SYRINGE CATH TIP 50ML

## (undated) DEVICE — DRAINBAG,ANTI-REFLUX TOWER,L/F,2000ML,LL: Brand: MEDLINE

## (undated) DEVICE — DRAPE,REIN 53X77,STERILE: Brand: MEDLINE

## (undated) DEVICE — ELECTRODE PT RET AD L9FT HI MOIST COND ADH HYDRGEL CORDED

## (undated) DEVICE — CATHETER URETH 22FR BLLN 30CC 3 W F SPEC INF CTRL BARDX

## (undated) DEVICE — Device

## (undated) DEVICE — 24/26FR BIPOLAR VAPOR ELECTRODE EXT LGTH: Brand: N.A.

## (undated) DEVICE — STRAP,CATHETER,ELASTIC,HOOK&LOOP: Brand: MEDLINE

## (undated) DEVICE — MHPB CYSTO PACK-LF: Brand: MEDLINE INDUSTRIES, INC.

## (undated) DEVICE — Y-TYPE TUR/BLADDER IRRIGATION SET, REGULATING CLAMP

## (undated) DEVICE — GLOVE ORANGE PI 7 1/2   MSG9075

## (undated) DEVICE — TUBING, SUCTION, 9/32" X 20', STRAIGHT: Brand: MEDLINE INDUSTRIES, INC.

## (undated) DEVICE — EVACUATOR URO BLDR W/ ADPT UROVAC

## (undated) DEVICE — 60 ML SYRINGE LUER-LOCK TIP: Brand: MONOJECT

## (undated) DEVICE — GLOVE SURG SZ 65 THK91MIL LTX FREE SYN POLYISOPRENE

## (undated) DEVICE — PLUG,CATHETER,DRAINAGE PROTECTOR,TUBE: Brand: MEDLINE

## (undated) DEVICE — CYSTO/BLADDER IRRIGATION SET, REGULATING CLAMP

## (undated) DEVICE — SOLUTION IRRIG 3000ML STRL H2O USP UROMATIC PLAS CONT

## (undated) DEVICE — PAD N ADH W3XL4IN POLY COT SFT PERF FLM EASILY CUT ABSRB

## (undated) DEVICE — PACK PROCEDURE SURG CYSTO SVMMC LF

## (undated) DEVICE — SOLUTION IRRIG 3000ML 0.9% SOD CHL USP UROMATIC PLAS CONT

## (undated) DEVICE — CATHETER URETH 24FR BLLN 30CC SIL ALLY W/ SIL HYDRGEL 3 W F

## (undated) DEVICE — CUTTING LOOP, BIPOLAR, 24/26 FR.: Brand: N.A.

## (undated) DEVICE — SOLUTION IRRIG 1000ML 0.9% SOD CHL USP POUR PLAS BTL

## (undated) DEVICE — SOLUTION,WATER,IRRIGATION,1000ML,STERILE: Brand: MEDLINE

## (undated) DEVICE — PROTECTOR ULN NRV PUR FOAM HK LOOP STRP ANATOMICALLY

## (undated) DEVICE — CORD ES L10FT BLU MPLR FT SWCH DISP ACMI

## (undated) DEVICE — GLOVE ORANGE PI 7   MSG9070

## (undated) DEVICE — 60 ML SYRINGE,CATHETER TIP: Brand: MONOJECT

## (undated) DEVICE — GUIDEWIRE URO L150CM DIA .035IN STIFF NIT HYDRPHL STR TIP

## (undated) DEVICE — HF-RESECTION ELECTRODE PLASMALOOP LOOP, MEDIUM, 24 FR., 12°/16°, ESG TURIS: Brand: OLYMPUS

## (undated) DEVICE — SOLUTION IV IRRIG WATER 1000ML POUR BRL 2F7114